# Patient Record
Sex: MALE | Race: WHITE | Employment: OTHER | URBAN - NONMETROPOLITAN AREA
[De-identification: names, ages, dates, MRNs, and addresses within clinical notes are randomized per-mention and may not be internally consistent; named-entity substitution may affect disease eponyms.]

---

## 2017-02-15 ENCOUNTER — OFFICE VISIT (OUTPATIENT)
Dept: PRIMARY CARE CLINIC | Facility: CLINIC | Age: 67
End: 2017-02-15

## 2017-02-15 VITALS
OXYGEN SATURATION: 94 % | RESPIRATION RATE: 16 BRPM | DIASTOLIC BLOOD PRESSURE: 78 MMHG | WEIGHT: 162 LBS | HEART RATE: 56 BPM | TEMPERATURE: 97.5 F | SYSTOLIC BLOOD PRESSURE: 149 MMHG | HEIGHT: 70 IN | BODY MASS INDEX: 23.19 KG/M2

## 2017-02-15 DIAGNOSIS — J01.00 ACUTE MAXILLARY SINUSITIS, RECURRENCE NOT SPECIFIED: Primary | ICD-10-CM

## 2017-02-15 DIAGNOSIS — J44.1 COPD EXACERBATION (HCC): ICD-10-CM

## 2017-02-15 DIAGNOSIS — R05.9 COUGH: ICD-10-CM

## 2017-02-15 LAB
INFLUENZA A ANTIBODY: NEGATIVE
INFLUENZA B ANTIBODY: NEGATIVE

## 2017-02-15 PROCEDURE — 87804 INFLUENZA ASSAY W/OPTIC: CPT | Performed by: NURSE PRACTITIONER

## 2017-02-15 PROCEDURE — 99213 OFFICE O/P EST LOW 20 MIN: CPT | Performed by: NURSE PRACTITIONER

## 2017-02-15 RX ORDER — PREDNISONE 20 MG/1
40 TABLET ORAL DAILY
Qty: 10 TABLET | Refills: 0 | Status: SHIPPED | OUTPATIENT
Start: 2017-02-15 | End: 2017-02-20

## 2017-02-15 RX ORDER — DOXYCYCLINE HYCLATE 100 MG
100 TABLET ORAL 2 TIMES DAILY
Qty: 14 TABLET | Refills: 0 | Status: SHIPPED | OUTPATIENT
Start: 2017-02-15 | End: 2017-02-22

## 2017-02-15 ASSESSMENT — ENCOUNTER SYMPTOMS
WHEEZING: 1
COUGH: 1
SHORTNESS OF BREATH: 0
SINUS PRESSURE: 1

## 2017-03-06 ENCOUNTER — OFFICE VISIT (OUTPATIENT)
Dept: FAMILY MEDICINE CLINIC | Facility: CLINIC | Age: 67
End: 2017-03-06

## 2017-03-06 VITALS
BODY MASS INDEX: 23.96 KG/M2 | SYSTOLIC BLOOD PRESSURE: 118 MMHG | OXYGEN SATURATION: 94 % | DIASTOLIC BLOOD PRESSURE: 58 MMHG | HEART RATE: 60 BPM | TEMPERATURE: 97.6 F | WEIGHT: 167 LBS

## 2017-03-06 DIAGNOSIS — Z23 NEED FOR PNEUMOCOCCAL VACCINATION: ICD-10-CM

## 2017-03-06 DIAGNOSIS — J10.1 INFLUENZA A: Primary | ICD-10-CM

## 2017-03-06 PROCEDURE — 99213 OFFICE O/P EST LOW 20 MIN: CPT | Performed by: NURSE PRACTITIONER

## 2017-03-06 PROCEDURE — G0009 ADMIN PNEUMOCOCCAL VACCINE: HCPCS | Performed by: NURSE PRACTITIONER

## 2017-03-06 PROCEDURE — 90732 PPSV23 VACC 2 YRS+ SUBQ/IM: CPT | Performed by: NURSE PRACTITIONER

## 2017-03-06 RX ORDER — ATORVASTATIN CALCIUM 40 MG/1
40 TABLET, FILM COATED ORAL
COMMUNITY
End: 2017-10-13 | Stop reason: ALTCHOICE

## 2017-03-06 ASSESSMENT — ENCOUNTER SYMPTOMS
COUGH: 1
DIARRHEA: 0
NAUSEA: 0
SPUTUM PRODUCTION: 1
CONSTIPATION: 0
HEMOPTYSIS: 0
SHORTNESS OF BREATH: 0
WHEEZING: 0
VOMITING: 0

## 2017-03-06 ASSESSMENT — LIFESTYLE VARIABLES: HISTORY_ALCOHOL_USE: NO

## 2017-03-31 RX ORDER — FLUTICASONE PROPIONATE 50 MCG
1 SPRAY, SUSPENSION (ML) NASAL DAILY
Qty: 1 BOTTLE | Refills: 3 | Status: SHIPPED | OUTPATIENT
Start: 2017-03-31 | End: 2017-09-25

## 2017-04-06 ENCOUNTER — OFFICE VISIT (OUTPATIENT)
Dept: CARDIOLOGY CLINIC | Age: 67
End: 2017-04-06
Payer: MEDICARE

## 2017-04-06 VITALS
WEIGHT: 168 LBS | OXYGEN SATURATION: 99 % | BODY MASS INDEX: 24.11 KG/M2 | HEART RATE: 58 BPM | SYSTOLIC BLOOD PRESSURE: 140 MMHG | DIASTOLIC BLOOD PRESSURE: 70 MMHG

## 2017-04-06 DIAGNOSIS — I48.0 PAROXYSMAL ATRIAL FIBRILLATION (HCC): Primary | ICD-10-CM

## 2017-04-06 PROCEDURE — 99214 OFFICE O/P EST MOD 30 MIN: CPT | Performed by: INTERNAL MEDICINE

## 2017-04-21 ENCOUNTER — TELEPHONE (OUTPATIENT)
Dept: FAMILY MEDICINE CLINIC | Age: 67
End: 2017-04-21

## 2017-09-25 ENCOUNTER — APPOINTMENT (OUTPATIENT)
Dept: CT IMAGING | Age: 67
End: 2017-09-25
Payer: MEDICARE

## 2017-09-25 ENCOUNTER — HOSPITAL ENCOUNTER (EMERGENCY)
Age: 67
Discharge: HOME | End: 2017-09-25
Attending: FAMILY MEDICINE
Payer: MEDICARE

## 2017-09-25 ENCOUNTER — APPOINTMENT (OUTPATIENT)
Dept: GENERAL RADIOLOGY | Age: 67
End: 2017-09-25
Payer: MEDICARE

## 2017-09-25 VITALS
BODY MASS INDEX: 23.68 KG/M2 | DIASTOLIC BLOOD PRESSURE: 81 MMHG | WEIGHT: 165 LBS | OXYGEN SATURATION: 100 % | RESPIRATION RATE: 20 BRPM | HEART RATE: 57 BPM | TEMPERATURE: 98 F | SYSTOLIC BLOOD PRESSURE: 172 MMHG

## 2017-09-25 DIAGNOSIS — J44.1 COPD EXACERBATION (HCC): Primary | ICD-10-CM

## 2017-09-25 LAB
ABSOLUTE EOS #: 0.2 K/UL (ref 0–0.4)
ABSOLUTE LYMPH #: 1.9 K/UL (ref 1–4.8)
ABSOLUTE MONO #: 0.6 K/UL (ref 0–1)
ANION GAP SERPL CALCULATED.3IONS-SCNC: 9 MMOL/L (ref 9–17)
BASOPHILS # BLD: 1 %
BASOPHILS ABSOLUTE: 0.1 K/UL (ref 0–0.2)
BUN BLDV-MCNC: 17 MG/DL (ref 8–23)
BUN/CREAT BLD: 25 (ref 9–20)
CALCIUM SERPL-MCNC: 9.4 MG/DL (ref 8.6–10.4)
CHLORIDE BLD-SCNC: 102 MMOL/L (ref 98–107)
CO2: 30 MMOL/L (ref 20–31)
CREAT SERPL-MCNC: 0.67 MG/DL (ref 0.7–1.2)
D-DIMER QUANTITATIVE: 1.7 MG/L FEU (ref 0–0.5)
DIFFERENTIAL TYPE: YES
EOSINOPHILS RELATIVE PERCENT: 3 %
GFR AFRICAN AMERICAN: >60 ML/MIN
GFR NON-AFRICAN AMERICAN: >60 ML/MIN
GFR SERPL CREATININE-BSD FRML MDRD: ABNORMAL ML/MIN/{1.73_M2}
GFR SERPL CREATININE-BSD FRML MDRD: ABNORMAL ML/MIN/{1.73_M2}
GLUCOSE BLD-MCNC: 117 MG/DL (ref 70–99)
HCT VFR BLD CALC: 39.1 % (ref 41–53)
HEMOGLOBIN: 13.1 G/DL (ref 13.5–17.5)
LYMPHOCYTES # BLD: 27 %
MCH RBC QN AUTO: 29 PG (ref 26–34)
MCHC RBC AUTO-ENTMCNC: 33.5 G/DL (ref 31–37)
MCV RBC AUTO: 86.6 FL (ref 80–100)
MONOCYTES # BLD: 9 %
PDW BLD-RTO: 14.7 % (ref 12.1–15.2)
PLATELET # BLD: 165 K/UL (ref 140–450)
PLATELET ESTIMATE: ABNORMAL
PMV BLD AUTO: ABNORMAL FL (ref 6–12)
POTASSIUM SERPL-SCNC: 4 MMOL/L (ref 3.7–5.3)
RBC # BLD: 4.52 M/UL (ref 4.5–5.9)
RBC # BLD: ABNORMAL 10*6/UL
SEG NEUTROPHILS: 60 %
SEGMENTED NEUTROPHILS ABSOLUTE COUNT: 4.2 K/UL (ref 2.1–6.5)
SODIUM BLD-SCNC: 141 MMOL/L (ref 135–144)
TROPONIN INTERP: NORMAL
TROPONIN T: <0.03 NG/ML
WBC # BLD: 6.9 K/UL (ref 3.5–11)
WBC # BLD: ABNORMAL 10*3/UL

## 2017-09-25 PROCEDURE — 71275 CT ANGIOGRAPHY CHEST: CPT

## 2017-09-25 PROCEDURE — 99285 EMERGENCY DEPT VISIT HI MDM: CPT

## 2017-09-25 PROCEDURE — 85379 FIBRIN DEGRADATION QUANT: CPT

## 2017-09-25 PROCEDURE — 71020 XR CHEST STANDARD TWO VW: CPT

## 2017-09-25 PROCEDURE — 85025 COMPLETE CBC W/AUTO DIFF WBC: CPT

## 2017-09-25 PROCEDURE — 96365 THER/PROPH/DIAG IV INF INIT: CPT

## 2017-09-25 PROCEDURE — 6370000000 HC RX 637 (ALT 250 FOR IP): Performed by: FAMILY MEDICINE

## 2017-09-25 PROCEDURE — 84484 ASSAY OF TROPONIN QUANT: CPT

## 2017-09-25 PROCEDURE — 96375 TX/PRO/DX INJ NEW DRUG ADDON: CPT

## 2017-09-25 PROCEDURE — 6360000002 HC RX W HCPCS: Performed by: FAMILY MEDICINE

## 2017-09-25 PROCEDURE — 93005 ELECTROCARDIOGRAM TRACING: CPT

## 2017-09-25 PROCEDURE — 6360000004 HC RX CONTRAST MEDICATION: Performed by: FAMILY MEDICINE

## 2017-09-25 PROCEDURE — 94664 DEMO&/EVAL PT USE INHALER: CPT

## 2017-09-25 PROCEDURE — 80048 BASIC METABOLIC PNL TOTAL CA: CPT

## 2017-09-25 PROCEDURE — 94640 AIRWAY INHALATION TREATMENT: CPT

## 2017-09-25 RX ORDER — IPRATROPIUM BROMIDE AND ALBUTEROL SULFATE 2.5; .5 MG/3ML; MG/3ML
1 SOLUTION RESPIRATORY (INHALATION) ONCE
Status: COMPLETED | OUTPATIENT
Start: 2017-09-25 | End: 2017-09-25

## 2017-09-25 RX ORDER — METHYLPREDNISOLONE SODIUM SUCCINATE 125 MG/2ML
125 INJECTION, POWDER, LYOPHILIZED, FOR SOLUTION INTRAMUSCULAR; INTRAVENOUS ONCE
Status: COMPLETED | OUTPATIENT
Start: 2017-09-25 | End: 2017-09-25

## 2017-09-25 RX ORDER — LEVOFLOXACIN 500 MG/1
500 TABLET, FILM COATED ORAL DAILY
Qty: 10 TABLET | Refills: 0 | Status: SHIPPED | OUTPATIENT
Start: 2017-09-25 | End: 2017-10-05

## 2017-09-25 RX ORDER — LEVOFLOXACIN 5 MG/ML
500 INJECTION, SOLUTION INTRAVENOUS ONCE
Status: COMPLETED | OUTPATIENT
Start: 2017-09-25 | End: 2017-09-25

## 2017-09-25 RX ORDER — FLUTICASONE PROPIONATE 50 MCG
1 SPRAY, SUSPENSION (ML) NASAL DAILY
Qty: 1 BOTTLE | Refills: 3 | Status: SHIPPED | OUTPATIENT
Start: 2017-09-25 | End: 2017-12-12 | Stop reason: SDUPTHER

## 2017-09-25 RX ORDER — PREDNISONE 20 MG/1
20 TABLET ORAL 2 TIMES DAILY
Qty: 20 TABLET | Refills: 0 | Status: SHIPPED | OUTPATIENT
Start: 2017-09-25 | End: 2017-10-05

## 2017-09-25 RX ADMIN — METHYLPREDNISOLONE SODIUM SUCCINATE 125 MG: 125 INJECTION, POWDER, FOR SOLUTION INTRAMUSCULAR; INTRAVENOUS at 17:46

## 2017-09-25 RX ADMIN — LEVOFLOXACIN 500 MG: 5 INJECTION, SOLUTION INTRAVENOUS at 17:47

## 2017-09-25 RX ADMIN — IPRATROPIUM BROMIDE AND ALBUTEROL SULFATE 1 AMPULE: .5; 3 SOLUTION RESPIRATORY (INHALATION) at 17:41

## 2017-09-25 RX ADMIN — IOVERSOL 100 ML: 741 INJECTION INTRA-ARTERIAL; INTRAVENOUS at 19:19

## 2017-10-03 LAB
EKG ATRIAL RATE: 60 BPM
EKG P AXIS: 75 DEGREES
EKG P-R INTERVAL: 138 MS
EKG Q-T INTERVAL: 410 MS
EKG QRS DURATION: 80 MS
EKG QTC CALCULATION (BAZETT): 410 MS
EKG R AXIS: 65 DEGREES
EKG T AXIS: 81 DEGREES
EKG VENTRICULAR RATE: 60 BPM

## 2017-10-13 ENCOUNTER — HOSPITAL ENCOUNTER (EMERGENCY)
Age: 67
Discharge: HOME | End: 2017-10-13
Attending: FAMILY MEDICINE
Payer: MEDICARE

## 2017-10-13 VITALS
TEMPERATURE: 98.4 F | DIASTOLIC BLOOD PRESSURE: 74 MMHG | SYSTOLIC BLOOD PRESSURE: 114 MMHG | OXYGEN SATURATION: 97 % | RESPIRATION RATE: 24 BRPM | HEART RATE: 61 BPM

## 2017-10-13 DIAGNOSIS — I48.0 PAROXYSMAL ATRIAL FIBRILLATION (HCC): ICD-10-CM

## 2017-10-13 DIAGNOSIS — B35.3 TINEA PEDIS OF BOTH FEET: Primary | ICD-10-CM

## 2017-10-13 DIAGNOSIS — I71.40 ABDOMINAL ANEURYSM: ICD-10-CM

## 2017-10-13 LAB
EKG ATRIAL RATE: 65 BPM
EKG P AXIS: 47 DEGREES
EKG P-R INTERVAL: 136 MS
EKG Q-T INTERVAL: 396 MS
EKG QRS DURATION: 80 MS
EKG QTC CALCULATION (BAZETT): 411 MS
EKG R AXIS: 71 DEGREES
EKG T AXIS: 77 DEGREES
EKG VENTRICULAR RATE: 65 BPM

## 2017-10-13 PROCEDURE — 99284 EMERGENCY DEPT VISIT MOD MDM: CPT

## 2017-10-13 PROCEDURE — 93005 ELECTROCARDIOGRAM TRACING: CPT

## 2017-10-13 RX ORDER — ROSUVASTATIN CALCIUM 20 MG/1
20 TABLET, COATED ORAL DAILY
COMMUNITY

## 2017-10-13 RX ORDER — KETOCONAZOLE 20 MG/G
CREAM TOPICAL
Qty: 30 G | Refills: 1 | Status: SHIPPED | OUTPATIENT
Start: 2017-10-13 | End: 2017-12-08 | Stop reason: ALTCHOICE

## 2017-10-13 NOTE — ED NOTES
Bilateral feet are red in color on bottoms and started to go to top of feet.        Flavia Owusu, RN  10/13/17 1633

## 2017-10-13 NOTE — ED NOTES
Discharge instructions with script given to patient. Good verbal understanding received from patient and from patient's wife. Skin warm and dry. Respirations even and unlabored. Ambulated to front ED EXit with steady gait.        James Lomax RN  10/13/17 0278

## 2017-10-13 NOTE — ED PROVIDER NOTES
50 MCG/ACT nasal spray 1 spray by Nasal route daily 1 Bottle 3    OXYGEN Inhale into the lungs 2.5 liters daily while sitting, and 3.5 when up ambulating         ALLERGIES    No Known Allergies    FAMILY HISTORY    Family History   Problem Relation Age of Onset    Cancer Mother      Pancreatic cancer    Cancer Father      Lung    Cancer Brother      Brain cancer, pancreatic cancer, colon cancer       SOCIAL HISTORY    Social History     Social History    Marital status:      Spouse name: N/A    Number of children: N/A    Years of education: N/A     Social History Main Topics    Smoking status: Former Smoker     Packs/day: 0.25     Years: 39.00     Types: Cigarettes     Quit date: 2/20/2017    Smokeless tobacco: Never Used    Alcohol use No      Comment: very little    Drug use: No    Sexual activity: Not Asked     Other Topics Concern    None     Social History Narrative    None       REVIEW OF SYSTEMS    Constitutional:  Denies fever, chills, weight loss or weakness   Eyes:  Denies photophobia or discharge   HENT:  Denies sore throat or ear pain   Respiratory:  Denies cough or shortness of breath   Cardiovascular:  Denies chest pain,  or swelling   GI:  Denies abdominal pain, nausea, vomiting, or diarrhea   Musculoskeletal:  Denies back pain   Skin:  Denies rash   Neurologic:  Denies headache, focal weakness or sensory changes   Endocrine:  Denies polyuria or polydypsia   Lymphatic:  Denies swollen glands   Psychiatric:  Denies depression, suicidal ideation or homicidal ideation   All systems negative except as marked. PHYSICAL EXAM    VITAL SIGNS: /74   Pulse 61   Temp 98.4 °F (36.9 °C) (Oral)   Resp 24   SpO2 97%    Constitutional:  Well developed, Well nourished, No acute distress, Non-toxic appearance.    HENT:  Normocephalic, Atraumatic, Bilateral external ears normal, TM's normal, Oropharynx moist, No oral exudates, Nose normal. Neck- Normal range of motion, No tenderness, Supple, No stridor. Eyes:  PERRL, EOMI, Conjunctiva normal, No discharge. Respiratory:  Normal breath sounds, No respiratory distress, No wheezing, No chest tenderness. Cardiovascular:  Normal heart rate, Normal rhythm, No murmurs, No rubs, No gallops appreciated. [facility]:  Bowel sounds normal, Soft, No tenderness, No masses or hepatosplenomegaly, No pulsatile masses. : No CVA tenderness. Musculoskeletal:   No edema, No tenderness, No cyanosis, No clubbing. Good range of motion in all major joints. No tenderness to palpation or major deformities noted. Back- No tenderness to percussion of spine. Integument:  Warm, Dry, No erythema, Scaly red rash in a modified stocking distribution both feet. . Consistent with Tinea. Lymphatic:  No lymphadenopathy noted. Neurologic:  Alert & oriented x 3, Normal motor function, Normal sensory function, No focal deficits noted. Cranial nerves 2-12 wnl and symmetrical.   Psychiatric:  Affect normal, Judgment normal, Mood normal.     EKG    EKG is done on October 13, 2017. Ventricular rate is 65, ND interval 136, QRS 80, /411. Sinus rhythm: Sinus arrhythmia. Low voltage QRS. Borderline EKG. RADIOLOGY    Xr Chest Standard (2 Vw)    Result Date: 9/25/2017  TWO-VIEW CHEST RADIOGRAPH, 9/25/2017 6:10 PM: COMPARISON: Chest, 2/26/2017. CLINICAL HISTORY: Dyspnea with productive cough for the last 2 days. FINDINGS: No acute cardiopulmonary disease. Occasional scattered evidence of calcified granuloma redemonstrated. No pulmonary edema, pneumothorax, or pleural effusion. Normal heart size. No acute osseous abnormality. No acute abnormality identified. Cta Chest W Contrast    Result Date: 9/25/2017  EXAMINATION: CTA CHEST WITH CONTRAST CLINICAL STATEMENT: Shortness of breath for 3 to 4 days. Elevated d-dimers. . COMPARISON: 1/11/2016.  TECHNIQUE: CT angiography (non-coronary) of the chest was performed following intravenous administration of 100 mL of Optiray 350. Coronal and sagittal reconstruction images were also obtained and assessed. Dose reduction techniques were achieved by using automated exposure control and/or adjustment of mA and/or kV according to patient size and/or use of iterative reconstruction technique. FINDINGS: There is no central filling defect to suggest a pulmonary embolus. The visualized neck base is unremarkable. Borderline-enlarged right hilar lymph nodes, nonspecific in etiology and overall stable since prior. It measures up to 1.2 cm in short axis. An additional right hilar lymph node measuring 10 mm in size is seen, also stable since previous study. The heart is normal in size and there is no significant pericardial effusion. The aorta is normal in caliber. Mild diffuse wall calcification of the aorta and its main branches. Additional soft plaque within the origin of the arch vessels. Findings similar to prior. The lungs are clear without infiltrates or nodules. Multiple pleural-based focal plaques again seen within bilateral upper and lower lobe pleura similar to previous exam some of which are partially calcified. Findings not significantly changed since previous exam. There is no pleural effusion or pneumothorax identified. The limited evaluation of the visualized solid organs within the upper abdomen is unremarkable. 1. There is no central filling defect to suggest a pulmonary embolus or aortic aneurysm. No significant interval changes since previous exam. 2. No CT evidence for acute intrathoracic pathology. 3. Redemonstration of numerous focal pleural-based plaques bilaterally overall stable since prior study some of which with partial calcification. Clinical correlation with history of asbestos exposure is recommended. 4. Additional nonacute findings as described. PROCEDURES        Labs  Labs Reviewed - No data to display          Summation      Patient Course: Patient with red feet consistent with tinea pedis. Ketoconazole prescribed. Fu with primary care. See cardiologist about the atrial fib. Call [facility] about graft preparation. ED Medications administered this visit:  Medications - No data to display    New Prescriptions from this visit:    Discharge Medication List as of 10/13/2017 12:18 PM      START taking these medications    Details   ketoconazole (NIZORAL) 2 % cream Apply topically daily. , Disp-30 g, R-1, Print             Follow-up:  Connie Farrar, DO  [address] [address]     In 1 week          Final Impression:   1. Tinea pedis of both feet    2.  Paroxysmal atrial fibrillation (HCC)    3. Abdominal aneurysm ([address]               (Please note that portions of this note were completed with a voice recognition program.  Efforts were made to edit the dictations but occasionally words are mis-transcribed.)            Jason Sanchez MD  10/13/17 5246

## 2017-12-08 ENCOUNTER — HOSPITAL ENCOUNTER (EMERGENCY)
Age: 67
Discharge: HOME | End: 2017-12-08
Attending: EMERGENCY MEDICINE
Payer: MEDICARE

## 2017-12-08 ENCOUNTER — APPOINTMENT (OUTPATIENT)
Dept: CT IMAGING | Age: 67
End: 2017-12-08
Payer: MEDICARE

## 2017-12-08 VITALS
DIASTOLIC BLOOD PRESSURE: 76 MMHG | TEMPERATURE: 98 F | HEART RATE: 58 BPM | RESPIRATION RATE: 20 BRPM | SYSTOLIC BLOOD PRESSURE: 176 MMHG | OXYGEN SATURATION: 97 %

## 2017-12-08 DIAGNOSIS — N21.9 CALCULUS OF LOWER URINARY TRACT: Primary | ICD-10-CM

## 2017-12-08 LAB
-: NORMAL
ABSOLUTE EOS #: 0.2 K/UL (ref 0–0.4)
ABSOLUTE IMMATURE GRANULOCYTE: ABNORMAL K/UL (ref 0–0.3)
ABSOLUTE LYMPH #: 1.5 K/UL (ref 1–4.8)
ABSOLUTE MONO #: 0.6 K/UL (ref 0–1)
ALBUMIN SERPL-MCNC: 4.4 G/DL (ref 3.5–5.2)
ALBUMIN/GLOBULIN RATIO: ABNORMAL (ref 1–2.5)
ALP BLD-CCNC: 71 U/L (ref 40–129)
ALT SERPL-CCNC: 17 U/L (ref 5–41)
AMORPHOUS: NORMAL
ANION GAP SERPL CALCULATED.3IONS-SCNC: 13 MMOL/L (ref 9–17)
AST SERPL-CCNC: 21 U/L
BACTERIA: NORMAL
BASOPHILS # BLD: 1 % (ref 0–2)
BASOPHILS ABSOLUTE: 0.1 K/UL (ref 0–0.2)
BILIRUB SERPL-MCNC: 0.61 MG/DL (ref 0.3–1.2)
BILIRUBIN URINE: NEGATIVE
BUN BLDV-MCNC: 14 MG/DL (ref 8–23)
BUN/CREAT BLD: 15 (ref 9–20)
CALCIUM SERPL-MCNC: 9.8 MG/DL (ref 8.6–10.4)
CASTS UA: NORMAL /LPF
CHLORIDE BLD-SCNC: 97 MMOL/L (ref 98–107)
CO2: 29 MMOL/L (ref 20–31)
COLOR: ABNORMAL
COMMENT UA: ABNORMAL
CREAT SERPL-MCNC: 0.94 MG/DL (ref 0.7–1.2)
CRYSTALS, UA: NORMAL /HPF
DIFFERENTIAL TYPE: YES
EOSINOPHILS RELATIVE PERCENT: 3 % (ref 0–5)
EPITHELIAL CELLS UA: NORMAL /HPF
GFR AFRICAN AMERICAN: >60 ML/MIN
GFR NON-AFRICAN AMERICAN: >60 ML/MIN
GFR SERPL CREATININE-BSD FRML MDRD: ABNORMAL ML/MIN/{1.73_M2}
GFR SERPL CREATININE-BSD FRML MDRD: ABNORMAL ML/MIN/{1.73_M2}
GLUCOSE BLD-MCNC: 96 MG/DL (ref 70–99)
GLUCOSE URINE: NEGATIVE
HCT VFR BLD CALC: 38.9 % (ref 41–53)
HEMOGLOBIN: 12.8 G/DL (ref 13.5–17.5)
IMMATURE GRANULOCYTES: ABNORMAL %
KETONES, URINE: NEGATIVE
LEUKOCYTE ESTERASE, URINE: ABNORMAL
LYMPHOCYTES # BLD: 16 % (ref 13–44)
MCH RBC QN AUTO: 28.7 PG (ref 26–34)
MCHC RBC AUTO-ENTMCNC: 32.8 G/DL (ref 31–37)
MCV RBC AUTO: 87.5 FL (ref 80–100)
MONOCYTES # BLD: 7 % (ref 5–9)
MUCUS: NORMAL
NITRITE, URINE: NEGATIVE
OTHER OBSERVATIONS UA: NORMAL
PDW BLD-RTO: 14.5 % (ref 12.1–15.2)
PH UA: 6 (ref 5–8)
PLATELET # BLD: 184 K/UL (ref 140–450)
PLATELET ESTIMATE: ABNORMAL
PMV BLD AUTO: ABNORMAL FL (ref 6–12)
POTASSIUM SERPL-SCNC: 4.5 MMOL/L (ref 3.7–5.3)
PROTEIN UA: ABNORMAL
RBC # BLD: 4.45 M/UL (ref 4.5–5.9)
RBC # BLD: ABNORMAL 10*6/UL
RBC UA: NORMAL /HPF (ref 0–2)
RENAL EPITHELIAL, UA: NORMAL /HPF
SEG NEUTROPHILS: 73 % (ref 39–75)
SEGMENTED NEUTROPHILS ABSOLUTE COUNT: 7 K/UL (ref 2.1–6.5)
SODIUM BLD-SCNC: 139 MMOL/L (ref 135–144)
SPECIFIC GRAVITY UA: 1.01 (ref 1–1.03)
TOTAL PROTEIN: 7.4 G/DL (ref 6.4–8.3)
TRICHOMONAS: NORMAL
TURBIDITY: ABNORMAL
URINE HGB: ABNORMAL
UROBILINOGEN, URINE: NORMAL
WBC # BLD: 9.4 K/UL (ref 3.5–11)
WBC # BLD: ABNORMAL 10*3/UL
WBC UA: NORMAL /HPF
YEAST: NORMAL

## 2017-12-08 PROCEDURE — 99284 EMERGENCY DEPT VISIT MOD MDM: CPT

## 2017-12-08 PROCEDURE — 74176 CT ABD & PELVIS W/O CONTRAST: CPT

## 2017-12-08 PROCEDURE — 85025 COMPLETE CBC W/AUTO DIFF WBC: CPT

## 2017-12-08 PROCEDURE — 6370000000 HC RX 637 (ALT 250 FOR IP): Performed by: EMERGENCY MEDICINE

## 2017-12-08 PROCEDURE — 6360000002 HC RX W HCPCS: Performed by: EMERGENCY MEDICINE

## 2017-12-08 PROCEDURE — 81001 URINALYSIS AUTO W/SCOPE: CPT

## 2017-12-08 PROCEDURE — 96374 THER/PROPH/DIAG INJ IV PUSH: CPT

## 2017-12-08 PROCEDURE — 80053 COMPREHEN METABOLIC PANEL: CPT

## 2017-12-08 RX ORDER — KETOROLAC TROMETHAMINE 30 MG/ML
15 INJECTION, SOLUTION INTRAMUSCULAR; INTRAVENOUS ONCE
Status: COMPLETED | OUTPATIENT
Start: 2017-12-08 | End: 2017-12-08

## 2017-12-08 RX ORDER — HYDROCODONE BITARTRATE AND ACETAMINOPHEN 5; 325 MG/1; MG/1
1 TABLET ORAL EVERY 6 HOURS PRN
Qty: 10 TABLET | Refills: 0 | Status: SHIPPED | OUTPATIENT
Start: 2017-12-08 | End: 2017-12-15

## 2017-12-08 RX ORDER — KETOROLAC TROMETHAMINE 30 MG/ML
30 INJECTION, SOLUTION INTRAMUSCULAR; INTRAVENOUS ONCE
Status: DISCONTINUED | OUTPATIENT
Start: 2017-12-08 | End: 2017-12-08

## 2017-12-08 RX ORDER — HYDROCODONE BITARTRATE AND ACETAMINOPHEN 5; 325 MG/1; MG/1
1 TABLET ORAL ONCE
Status: COMPLETED | OUTPATIENT
Start: 2017-12-08 | End: 2017-12-08

## 2017-12-08 RX ADMIN — HYDROCODONE BITARTRATE AND ACETAMINOPHEN 1 TABLET: 5; 325 TABLET ORAL at 20:03

## 2017-12-08 RX ADMIN — KETOROLAC TROMETHAMINE 15 MG: 30 INJECTION, SOLUTION INTRAMUSCULAR at 19:48

## 2017-12-08 ASSESSMENT — PAIN DESCRIPTION - FREQUENCY: FREQUENCY: CONTINUOUS

## 2017-12-08 ASSESSMENT — PAIN DESCRIPTION - PAIN TYPE: TYPE: ACUTE PAIN

## 2017-12-08 ASSESSMENT — PAIN DESCRIPTION - ORIENTATION: ORIENTATION: RIGHT

## 2017-12-08 ASSESSMENT — PAIN SCALES - GENERAL
PAINLEVEL_OUTOF10: 8
PAINLEVEL_OUTOF10: 6
PAINLEVEL_OUTOF10: 8

## 2017-12-08 ASSESSMENT — PAIN DESCRIPTION - ONSET: ONSET: ON-GOING

## 2017-12-08 ASSESSMENT — PAIN DESCRIPTION - DIRECTION: RADIATING_TOWARDS: RIGHT FLANK

## 2017-12-08 ASSESSMENT — PAIN DESCRIPTION - PROGRESSION: CLINICAL_PROGRESSION: GRADUALLY WORSENING

## 2017-12-08 ASSESSMENT — PAIN DESCRIPTION - LOCATION: LOCATION: ABDOMEN

## 2017-12-08 ASSESSMENT — PAIN DESCRIPTION - DESCRIPTORS: DESCRIPTORS: CONSTANT

## 2017-12-08 NOTE — ED PROVIDER NOTES
eMERGENCY dEPARTMENT eNCOUnter      [address] [facility]    Chief Complaint   Patient presents with    Abdominal Pain     right sided pain that radiates to right flank       hospitals    Rachel Maritn is a 74 y.o. male who presents to ED from home. By car. With complaint of R flank pain. Onset this am.  Intensity of symptoms moderate. Location of symptoms R flank. The patient started this morning. Patient has no prior history of kidney stones. Patient has a history of abdominal aneurysm. REVIEW OF SYSTEMS    All systems reviewed and positives are in the HPI    PAST MEDICAL HISTORY    Past Medical History:   Diagnosis Date    Abdominal aneurysm ([address]     x2    Atrial fibrillation (HCC)     Chicken pox     Chronic back pain     [facility]     Emphysema of lung (HCC)     [facility]     Hyperlipidemia     Hypertension 2015    Osteoarthritis        SURGICAL HISTORY    Past Surgical History:   Procedure Laterality Date    CARDIAC CATHETERIZATION Left 08/05/15    Dr. Shin People 30% disease of the left main trunck, Mild plaque disease in the coronary arteries. Normal LV function, ejection fraction of 60%    EYE SURGERY         CURRENT MEDICATIONS    Current Outpatient Rx   Medication Sig Dispense Refill    HYDROcodone-acetaminophen (NORCO) 5-325 MG per tablet Take 1 tablet by mouth every 6 hours as needed for Pain . 10 tablet 0    rosuvastatin (CRESTOR) 20 MG tablet Take 20 mg by mouth daily      fluticasone (FLONASE) 50 MCG/ACT nasal spray 1 spray by Nasal route daily 1 Bottle 3    OXYGEN Inhale into the lungs 2.5 liters daily while sitting, and 3.5 when up ambulating      carvedilol (COREG) 25 MG tablet Take 12.5 mg by mouth 2 times daily (with meals)       aspirin 81 MG chewable tablet Take 1 tablet by mouth daily 30 tablet 3    budesonide-formoterol (SYMBICORT) 160-4.5 MCG/ACT AERO Inhale 2 puffs into the lungs 2 times daily.       albuterol (PROVENTIL;VENTOLIN) 90 MCG/ACT inhaler Inhale 2 puffs into the lungs every 6 hours as needed.  tiotropium (SPIRIVA HANDIHALER) 18 MCG inhalation capsule Inhale 18 mcg into the lungs daily. ALLERGIES    No Known Allergies    FAMILY HISTORY    Family History   Problem Relation Age of Onset    Cancer Mother      Pancreatic cancer    Cancer Father      Lung    Cancer Brother      Brain cancer, pancreatic cancer, colon cancer       SOCIAL HISTORY    Social History     Social History    Marital status:      Spouse name: N/A    Number of children: N/A    Years of education: N/A     Social History Main Topics    Smoking status: Current Every Day Smoker     Packs/day: 0.25     Years: 39.00     Types: Cigarettes     Last attempt to quit: 2/20/2017    Smokeless tobacco: Never Used    Alcohol use No      Comment: very little    Drug use: No    Sexual activity: Not Asked     Other Topics Concern    None     Social History Narrative    None       PHYSICAL EXAM    VITAL SIGNS: BP (!) 176/76   Pulse 58   Temp 98 °F (36.7 °C) (Oral)   Resp 20   SpO2 98%   Constitutional:  Well developed, well nourished, no acute distress, non-toxic appearance   Eyes:  PERRL, conjunctiva normal   HENT:  Atraumatic, external ears normal, nose normal, oropharynx moist. Neck supple   Respiratory:  No respiratory distress, normal breath sounds   Cardiovascular:  Normal rate, normal rhythm, no murmurs, no gallops, no rubs   GI:  Abdomen is soft with mild right flank pain. :  CVA tenderness to percussion. Musculoskeletal:  No edema   Integument:  Well hydrated   Neurologic:  Alert & oriented x 3, no focal deficits     EKG        RADIOLOGY/PROCEDURES    CT ABDOMEN PELVIS WO IV CONTRAST   Final Result      1. Obstructing 2.5 mm calculus at the right ureterovesical junction causing    right-sided hydronephrosis with perinephric stranding. 2. Otherwise unchanged study.       3. Saccular aneurysm projecting off the left lateral wall of the abdominal    aorta at the left renal artery origin measuring 3.6 x 2.6 cm in combined    diameter with the native aorta. 4. A separate focal fusiform ectasia of the distal infrarenal abdominal aorta    is also unchanged by my measurements measuring 2.9 x 2.7 cm.      5. Calcified pleural plaque at the included lung bases compatible with asbestos    related disease. 6. Benign-appearing left renal cyst posteriorly measuring 16 mm.      7. Bilateral fat-containing inguinal hernias measuring 3.6 centers on the right    and 3.0 cm on the left. Labs  Labs Reviewed   URINALYSIS - Abnormal; Notable for the following:        Result Value    Color, UA SHWETA (*)     Turbidity UA CLOUDY (*)     Urine Hgb 3+ (*)     Protein, UA 2+ (*)     Leukocyte Esterase, Urine 1+ (*)     All other components within normal limits   CBC WITH AUTO DIFFERENTIAL - Abnormal; Notable for the following:     RBC 4.45 (*)     Hemoglobin 12.8 (*)     Hematocrit 38.9 (*)     Segs Absolute 7.00 (*)     All other components within normal limits   COMPREHENSIVE METABOLIC PANEL - Abnormal; Notable for the following:     Chloride 97 (*)     All other components within normal limits   MICROSCOPIC URINALYSIS           Summation      Patient Course: Patient has 2.5 mm kidney stone on CT. The warning signs were discussed. Patient is sent home with a prescription for Vicodin. Return to ED if worse. ED Medications administered this visit:    Medications   HYDROcodone-acetaminophen (NORCO) 5-325 MG per tablet 1 tablet (not administered)   ketorolac (TORADOL) injection 15 mg (15 mg Intravenous Given 12/1950)       New Prescriptions from this visit:    New Prescriptions    HYDROCODONE-ACETAMINOPHEN (NORCO) 5-325 MG PER TABLET    Take 1 tablet by mouth every 6 hours as needed for Pain .        Follow-up:  MD Trena Mata

## 2017-12-09 NOTE — ED NOTES
Pt. D/c with strainers and instructions to follow up with urology.      Leila Bright RN  12/08/17 2020

## 2017-12-11 ENCOUNTER — TELEPHONE (OUTPATIENT)
Dept: UROLOGY | Age: 67
End: 2017-12-11

## 2017-12-11 NOTE — TELEPHONE ENCOUNTER
Patient's wife called to schedule patient for follow up for stone - patient was seen in ER. Offered patient to come to [place] office today or tomorrow to be seen in case surgery needed but wife and patient both refused. Wife stated to make appointment in [facility]. She stated he is not a surgical candidate and they would wait to be seen in [facility].

## 2017-12-12 RX ORDER — FLUTICASONE PROPIONATE 50 MCG
1 SPRAY, SUSPENSION (ML) NASAL DAILY
Qty: 1 BOTTLE | Refills: 3 | Status: SHIPPED | OUTPATIENT
Start: 2017-12-12

## 2017-12-21 ENCOUNTER — OFFICE VISIT (OUTPATIENT)
Dept: UROLOGY | Age: 67
End: 2017-12-21
Payer: MEDICARE

## 2017-12-21 ENCOUNTER — HOSPITAL ENCOUNTER (OUTPATIENT)
Age: 67
Setting detail: SPECIMEN
Discharge: HOME | End: 2017-12-21
Payer: MEDICARE

## 2017-12-21 VITALS
SYSTOLIC BLOOD PRESSURE: 128 MMHG | WEIGHT: 166 LBS | BODY MASS INDEX: 23.77 KG/M2 | HEIGHT: 70 IN | DIASTOLIC BLOOD PRESSURE: 60 MMHG

## 2017-12-21 DIAGNOSIS — R31.9 HEMATURIA, UNSPECIFIED TYPE: Primary | ICD-10-CM

## 2017-12-21 DIAGNOSIS — N20.1 URETERAL CALCULUS: Primary | ICD-10-CM

## 2017-12-21 DIAGNOSIS — R31.9 HEMATURIA, UNSPECIFIED TYPE: ICD-10-CM

## 2017-12-21 LAB
-: ABNORMAL
AMORPHOUS: ABNORMAL
BACTERIA: ABNORMAL
BILIRUBIN URINE: NEGATIVE
CASTS UA: ABNORMAL /LPF
COLOR: ABNORMAL
COMMENT UA: ABNORMAL
CRYSTALS, UA: ABNORMAL /HPF
EPITHELIAL CELLS UA: ABNORMAL /HPF
GLUCOSE URINE: NEGATIVE
KETONES, URINE: NEGATIVE
LEUKOCYTE ESTERASE, URINE: NEGATIVE
MUCUS: ABNORMAL
NITRITE, URINE: NEGATIVE
OTHER OBSERVATIONS UA: ABNORMAL
PH UA: 6.5 (ref 5–8)
PROTEIN UA: NEGATIVE
RBC UA: ABNORMAL /HPF (ref 0–2)
RENAL EPITHELIAL, UA: ABNORMAL /HPF
SPECIFIC GRAVITY UA: 1.01 (ref 1–1.03)
TRICHOMONAS: ABNORMAL
TURBIDITY: CLEAR
URINE HGB: NEGATIVE
UROBILINOGEN, URINE: NORMAL
WBC UA: ABNORMAL /HPF
YEAST: ABNORMAL

## 2017-12-21 PROCEDURE — 81001 URINALYSIS AUTO W/SCOPE: CPT

## 2017-12-21 PROCEDURE — 99204 OFFICE O/P NEW MOD 45 MIN: CPT | Performed by: UROLOGY

## 2017-12-21 PROCEDURE — G8427 DOCREV CUR MEDS BY ELIG CLIN: HCPCS | Performed by: UROLOGY

## 2017-12-21 PROCEDURE — 87086 URINE CULTURE/COLONY COUNT: CPT

## 2017-12-21 PROCEDURE — 3017F COLORECTAL CA SCREEN DOC REV: CPT | Performed by: UROLOGY

## 2017-12-21 PROCEDURE — 1123F ACP DISCUSS/DSCN MKR DOCD: CPT | Performed by: UROLOGY

## 2017-12-21 PROCEDURE — 4040F PNEUMOC VAC/ADMIN/RCVD: CPT | Performed by: UROLOGY

## 2017-12-21 PROCEDURE — G8484 FLU IMMUNIZE NO ADMIN: HCPCS | Performed by: UROLOGY

## 2017-12-21 PROCEDURE — G8420 CALC BMI NORM PARAMETERS: HCPCS | Performed by: UROLOGY

## 2017-12-21 PROCEDURE — 4004F PT TOBACCO SCREEN RCVD TLK: CPT | Performed by: UROLOGY

## 2017-12-21 ASSESSMENT — ENCOUNTER SYMPTOMS
COUGH: 0
EYE PAIN: 0
WHEEZING: 0
EYE REDNESS: 0
ABDOMINAL PAIN: 0
NAUSEA: 0
SHORTNESS OF BREATH: 0
VOMITING: 0
COLOR CHANGE: 0
BACK PAIN: 0

## 2017-12-21 NOTE — PROGRESS NOTES
pulses  Abdomen: Soft, non-tender, non-distended with no CVA, flank pain, hepatosplenomegaly or hernia. Kidneys normal.  Bladder non-tender and not distended. Lymphatics: no palpable lymphadenopathy  Penis normal and circumcised  Urethral meatus normal  Scrotal exam normal  Testicles normal bilaterally  Epididymis normal bilaterally  No evidence of inguinal hernia  Anus and perineum normal  Normal rectal tone with no masses  Prostate soft, non-tender to palpation. No palpable nodules. Estimated 40 grams. Seminal vesicles not palpable. No results found for this visit on 12/21/17. No results found for: PSA  Lab Results   Component Value Date    BUN 14 12/08/2017     Lab Results   Component Value Date    CREATININE 0.94 12/08/2017     Review of Systems   Constitutional: Negative for appetite change, chills and fever. Eyes: Negative for pain, redness and visual disturbance. Respiratory: Negative for cough, shortness of breath and wheezing. Cardiovascular: Negative for chest pain and leg swelling. Gastrointestinal: Negative for abdominal pain, nausea and vomiting. Genitourinary: Negative for difficulty urinating, discharge, dysuria, flank pain, frequency, hematuria, scrotal swelling and testicular pain. Musculoskeletal: Negative for back pain, joint swelling and myalgias. Skin: Negative for color change, rash and wound. Neurological: Negative for dizziness, tremors and numbness. Hematological: Negative for adenopathy. Does not bruise/bleed easily. Objective:   Physical Exam    Assessment: This is a 69 y.o. male with the following diagnoses:  1. Ureteral calculus  XR ABDOMEN (KUB) (SINGLE AP VIEW)         Plan:      I did offer patient upper blockers today. He does not wish to pursue these. He would like to get a KUB in 6 months.

## 2017-12-22 LAB
CULTURE: NO GROWTH
CULTURE: NORMAL
Lab: NORMAL
SPECIMEN DESCRIPTION: NORMAL
SPECIMEN DESCRIPTION: NORMAL
STATUS: NORMAL

## 2019-01-07 ENCOUNTER — HOSPITAL ENCOUNTER (EMERGENCY)
Age: 69
Discharge: HOME | End: 2019-01-07
Attending: FAMILY MEDICINE
Payer: MEDICARE

## 2019-01-07 ENCOUNTER — APPOINTMENT (OUTPATIENT)
Dept: GENERAL RADIOLOGY | Age: 69
End: 2019-01-07
Payer: MEDICARE

## 2019-01-07 VITALS
BODY MASS INDEX: 21.66 KG/M2 | DIASTOLIC BLOOD PRESSURE: 71 MMHG | RESPIRATION RATE: 20 BRPM | TEMPERATURE: 98.5 F | HEART RATE: 58 BPM | HEIGHT: 72 IN | WEIGHT: 159.9 LBS | OXYGEN SATURATION: 99 % | SYSTOLIC BLOOD PRESSURE: 138 MMHG

## 2019-01-07 DIAGNOSIS — J20.9 SUBACUTE BRONCHITIS: Primary | ICD-10-CM

## 2019-01-07 LAB
DIRECT EXAM: NORMAL
DIRECT EXAM: NORMAL
EKG ATRIAL RATE: 53 BPM
EKG P AXIS: 88 DEGREES
EKG P-R INTERVAL: 142 MS
EKG Q-T INTERVAL: 424 MS
EKG QRS DURATION: 78 MS
EKG QTC CALCULATION (BAZETT): 397 MS
EKG R AXIS: 77 DEGREES
EKG T AXIS: 66 DEGREES
EKG VENTRICULAR RATE: 53 BPM
Lab: NORMAL
SPECIMEN DESCRIPTION: NORMAL
STATUS: NORMAL

## 2019-01-07 PROCEDURE — 71046 X-RAY EXAM CHEST 2 VIEWS: CPT

## 2019-01-07 PROCEDURE — 99283 EMERGENCY DEPT VISIT LOW MDM: CPT

## 2019-01-07 PROCEDURE — 93005 ELECTROCARDIOGRAM TRACING: CPT

## 2019-01-07 PROCEDURE — 87804 INFLUENZA ASSAY W/OPTIC: CPT

## 2019-01-07 RX ORDER — AZITHROMYCIN 250 MG/1
TABLET, FILM COATED ORAL
Qty: 6 TABLET | Refills: 0 | Status: SHIPPED | OUTPATIENT
Start: 2019-01-07 | End: 2019-01-17

## 2019-01-07 RX ORDER — PREDNISONE 20 MG/1
60 TABLET ORAL DAILY
Qty: 15 TABLET | Refills: 0 | Status: SHIPPED | OUTPATIENT
Start: 2019-01-07 | End: 2019-01-12

## 2019-01-07 RX ORDER — BENZONATATE 100 MG/1
100 CAPSULE ORAL 3 TIMES DAILY PRN
Qty: 30 CAPSULE | Refills: 0 | Status: SHIPPED | OUTPATIENT
Start: 2019-01-07 | End: 2019-01-14

## 2019-05-13 ENCOUNTER — APPOINTMENT (OUTPATIENT)
Dept: GENERAL RADIOLOGY | Age: 69
End: 2019-05-13
Payer: MEDICARE

## 2019-05-13 ENCOUNTER — HOSPITAL ENCOUNTER (EMERGENCY)
Age: 69
Discharge: HOME | End: 2019-05-13
Attending: FAMILY MEDICINE
Payer: MEDICARE

## 2019-05-13 VITALS
HEART RATE: 53 BPM | SYSTOLIC BLOOD PRESSURE: 144 MMHG | TEMPERATURE: 97.6 F | OXYGEN SATURATION: 97 % | WEIGHT: 160 LBS | DIASTOLIC BLOOD PRESSURE: 70 MMHG | RESPIRATION RATE: 18 BRPM | BODY MASS INDEX: 21.7 KG/M2

## 2019-05-13 DIAGNOSIS — J44.1 COPD WITH EXACERBATION (HCC): ICD-10-CM

## 2019-05-13 DIAGNOSIS — J40 BRONCHITIS: Primary | ICD-10-CM

## 2019-05-13 LAB
ABSOLUTE EOS #: 0.2 K/UL (ref 0–0.4)
ABSOLUTE IMMATURE GRANULOCYTE: ABNORMAL K/UL (ref 0–0.3)
ABSOLUTE LYMPH #: 1.2 K/UL (ref 1–4.8)
ABSOLUTE MONO #: 0.6 K/UL (ref 0–1)
ANION GAP SERPL CALCULATED.3IONS-SCNC: 12 MMOL/L (ref 9–17)
BASOPHILS # BLD: 1 % (ref 0–2)
BASOPHILS ABSOLUTE: 0 K/UL (ref 0–0.2)
BUN BLDV-MCNC: 16 MG/DL (ref 8–23)
BUN/CREAT BLD: 20 (ref 9–20)
CALCIUM SERPL-MCNC: 8.8 MG/DL (ref 8.6–10.4)
CHLORIDE BLD-SCNC: 98 MMOL/L (ref 98–107)
CO2: 28 MMOL/L (ref 20–31)
CREAT SERPL-MCNC: 0.8 MG/DL (ref 0.7–1.2)
DIFFERENTIAL TYPE: YES
EOSINOPHILS RELATIVE PERCENT: 4 % (ref 0–5)
GFR AFRICAN AMERICAN: >60 ML/MIN
GFR NON-AFRICAN AMERICAN: >60 ML/MIN
GFR SERPL CREATININE-BSD FRML MDRD: ABNORMAL ML/MIN/{1.73_M2}
GFR SERPL CREATININE-BSD FRML MDRD: ABNORMAL ML/MIN/{1.73_M2}
GLUCOSE BLD-MCNC: 125 MG/DL (ref 70–99)
HCT VFR BLD CALC: 37.1 % (ref 41–53)
HEMOGLOBIN: 12.4 G/DL (ref 13.5–17.5)
IMMATURE GRANULOCYTES: ABNORMAL %
LYMPHOCYTES # BLD: 24 % (ref 13–44)
MCH RBC QN AUTO: 29 PG (ref 26–34)
MCHC RBC AUTO-ENTMCNC: 33.4 G/DL (ref 31–37)
MCV RBC AUTO: 86.7 FL (ref 80–100)
MONOCYTES # BLD: 12 % (ref 5–9)
NRBC AUTOMATED: ABNORMAL PER 100 WBC
PDW BLD-RTO: 14.3 % (ref 12.1–15.2)
PLATELET # BLD: 167 K/UL (ref 140–450)
PLATELET ESTIMATE: ABNORMAL
PMV BLD AUTO: ABNORMAL FL (ref 6–12)
POTASSIUM SERPL-SCNC: 4 MMOL/L (ref 3.7–5.3)
RBC # BLD: 4.28 M/UL (ref 4.5–5.9)
RBC # BLD: ABNORMAL 10*6/UL
SEG NEUTROPHILS: 59 % (ref 39–75)
SEGMENTED NEUTROPHILS ABSOLUTE COUNT: 3.1 K/UL (ref 2.1–6.5)
SODIUM BLD-SCNC: 138 MMOL/L (ref 135–144)
WBC # BLD: 5.2 K/UL (ref 3.5–11)
WBC # BLD: ABNORMAL 10*3/UL

## 2019-05-13 PROCEDURE — 6360000002 HC RX W HCPCS: Performed by: FAMILY MEDICINE

## 2019-05-13 PROCEDURE — 80048 BASIC METABOLIC PNL TOTAL CA: CPT

## 2019-05-13 PROCEDURE — 6370000000 HC RX 637 (ALT 250 FOR IP): Performed by: FAMILY MEDICINE

## 2019-05-13 PROCEDURE — 96374 THER/PROPH/DIAG INJ IV PUSH: CPT

## 2019-05-13 PROCEDURE — 94640 AIRWAY INHALATION TREATMENT: CPT

## 2019-05-13 PROCEDURE — 99284 EMERGENCY DEPT VISIT MOD MDM: CPT

## 2019-05-13 PROCEDURE — 85025 COMPLETE CBC W/AUTO DIFF WBC: CPT

## 2019-05-13 PROCEDURE — 93005 ELECTROCARDIOGRAM TRACING: CPT

## 2019-05-13 PROCEDURE — 94664 DEMO&/EVAL PT USE INHALER: CPT

## 2019-05-13 PROCEDURE — 71046 X-RAY EXAM CHEST 2 VIEWS: CPT

## 2019-05-13 RX ORDER — AZITHROMYCIN 250 MG/1
500 TABLET, FILM COATED ORAL ONCE
Status: COMPLETED | OUTPATIENT
Start: 2019-05-13 | End: 2019-05-13

## 2019-05-13 RX ORDER — IPRATROPIUM BROMIDE AND ALBUTEROL SULFATE 2.5; .5 MG/3ML; MG/3ML
1 SOLUTION RESPIRATORY (INHALATION) ONCE
Status: COMPLETED | OUTPATIENT
Start: 2019-05-13 | End: 2019-05-13

## 2019-05-13 RX ORDER — ALBUTEROL SULFATE 2.5 MG/3ML
2.5 SOLUTION RESPIRATORY (INHALATION) EVERY 6 HOURS PRN
COMMUNITY

## 2019-05-13 RX ORDER — ACETAMINOPHEN 160 MG
2000 TABLET,DISINTEGRATING ORAL DAILY
COMMUNITY
End: 2021-03-15

## 2019-05-13 RX ORDER — AZITHROMYCIN 250 MG/1
TABLET, FILM COATED ORAL
Qty: 6 TABLET | Refills: 0 | Status: SHIPPED | OUTPATIENT
Start: 2019-05-13 | End: 2019-05-23

## 2019-05-13 RX ORDER — METHYLPREDNISOLONE SODIUM SUCCINATE 125 MG/2ML
125 INJECTION, POWDER, LYOPHILIZED, FOR SOLUTION INTRAMUSCULAR; INTRAVENOUS ONCE
Status: COMPLETED | OUTPATIENT
Start: 2019-05-13 | End: 2019-05-13

## 2019-05-13 RX ORDER — PREDNISONE 20 MG/1
60 TABLET ORAL DAILY
Qty: 15 TABLET | Refills: 0 | Status: SHIPPED | OUTPATIENT
Start: 2019-05-13 | End: 2019-05-18

## 2019-05-13 RX ADMIN — IPRATROPIUM BROMIDE AND ALBUTEROL SULFATE 1 AMPULE: .5; 3 SOLUTION RESPIRATORY (INHALATION) at 19:25

## 2019-05-13 RX ADMIN — METHYLPREDNISOLONE SODIUM SUCCINATE 125 MG: 125 INJECTION, POWDER, FOR SOLUTION INTRAMUSCULAR; INTRAVENOUS at 18:36

## 2019-05-13 RX ADMIN — AZITHROMYCIN 500 MG: 250 TABLET, FILM COATED ORAL at 20:17

## 2019-05-13 RX ADMIN — IPRATROPIUM BROMIDE AND ALBUTEROL SULFATE 1 AMPULE: .5; 3 SOLUTION RESPIRATORY (INHALATION) at 18:21

## 2019-05-14 LAB
EKG ATRIAL RATE: 60 BPM
EKG P AXIS: 80 DEGREES
EKG P-R INTERVAL: 148 MS
EKG Q-T INTERVAL: 438 MS
EKG QRS DURATION: 80 MS
EKG QTC CALCULATION (BAZETT): 438 MS
EKG R AXIS: 79 DEGREES
EKG T AXIS: 76 DEGREES
EKG VENTRICULAR RATE: 60 BPM

## 2019-05-14 NOTE — ED PROVIDER NOTES
[facility]  eMERGENCY dEPARTMENT eNCOUnter          [address] [facility]       Chief Complaint   Patient presents with    Cough     productive cough and congestion for 2 days     Nasal Congestion       Nurses Notes reviewed and I agree except as noted in the HPI. HISTORY OF PRESENT ILLNESS    Tiera Rocha is a 77 y.o. male who presents to emergency room with complaint of \"not feeling well\", patient states he's had cough and yellow-green phlegm production, subjective chills, some nasal congestion, states home nebulizing treatments have not helped. Patient has known history of COPD, does have home O2 at home nebulizer unit. Denies any chest pain. Denies any swelling of his lower extremities. Onset of symptoms over the past 2 days. REVIEW OF SYSTEMS     Review of Systems   All other systems reviewed and are negative. PAST MEDICAL HISTORY    has a past medical history of Abdominal aneurysm ([address] Atrial fibrillation ([address] Chicken pox, Chronic back pain, COPD (chronic obstructive pulmonary disease) ([address] Emphysema of lung ([address] Emphysema of lung ([address] Hyperlipidemia, Hypertension, and Osteoarthritis. SURGICAL HISTORY      has a past surgical history that includes eye surgery and Cardiac catheterization (Left, 08/05/15).     CURRENT MEDICATIONS       Discharge Medication List as of 5/13/2019  8:13 PM      CONTINUE these medications which have NOT CHANGED    Details   Cholecalciferol (VITAMIN D3) 2000 units CAPS Take 2,000 Units by mouth dailyHistorical Med      albuterol (PROVENTIL) (2.5 MG/3ML) 0.083% nebulizer solution Take 2.5 mg by nebulization every 6 hours as needed for WheezingHistorical Med      fluticasone (FLONASE) 50 MCG/ACT nasal spray 1 spray by Nasal route daily, Disp-1 Bottle, R-3Normal      rosuvastatin (CRESTOR) 20 MG tablet Take 20 mg by mouth dailyHistorical Med      OXYGEN Inhale into the lungs 2.5 liters daily while sitting, and 3.5-4 when up ambulatingHistorical Med      carvedilol (COREG) 25 MG tablet Take 12.5 mg by mouth 2 times daily (with meals)       aspirin 81 MG chewable tablet Take 1 tablet by mouth daily, Disp-30 tablet, R-3      budesonide-formoterol (SYMBICORT) 160-4.5 MCG/ACT AERO Inhale 2 puffs into the lungs 2 times daily. albuterol (PROVENTIL;VENTOLIN) 90 MCG/ACT inhaler Inhale 2 puffs into the lungs every 6 hours as needed. tiotropium (SPIRIVA HANDIHALER) 18 MCG inhalation capsule Inhale 18 mcg into the lungs daily. ALLERGIES     has No Known Allergies. FAMILY HISTORY     indicated that his mother is . He indicated that his father is . He indicated that his sister is alive. He indicated that his brother is . family history includes Cancer in his brother, father, and mother. SOCIAL HISTORY      reports that he has been smoking cigarettes. He has a 9.75 pack-year smoking history. He has never used smokeless tobacco. He reports that he does not drink alcohol or use drugs. PHYSICAL EXAM     INITIAL VITALS:  weight is 160 lb (72.6 kg). His oral temperature is 97.6 °F (36.4 °C). His blood pressure is 144/70 (abnormal) and his pulse is 53. His respiration is 18 and oxygen saturation is 97%. Physical Exam   Constitutional: Patient is oriented to person, place, and time. Patient appears well-developed and well-nourished. Patient is active and cooperative. HENT:   Head: Normocephalic and atraumatic. Head is without contusion. Right Ear: Hearing and external ear normal. No drainage. Left Ear: Hearing and external ear normal. No drainage. Nose: Nose normal. No nasal deformity. No epistaxis. Mouth/Throat: Mucous membranes are not dry. Negative oral lesions or exudate  Eyes: EOMI. Conjunctivae, sclera, and lids are normal. Right eye exhibits no discharge. Left eye exhibits no discharge.    Neck: Full passive range of motion without pain and phonation normal.   Cardiovascular: Decreased rate, regular rhythm and intact distal pulses. No lower extremity edema. Pulses: Right radial pulse  2+   Pulmonary/Chest: Effort normal. No tachypnea and no bradypnea. Globally decreased air movement with some scattered expiratory wheezing  Abdominal: Soft. Patient without distension or tenderness  Musculoskeletal:   Negative acute trauma or deformity,  apparent full range of motion and normal strength all extremities appropriate to age. Neurological: Patient is alert and oriented to person, place, and time. patient displays no tremor. Patient displays no seizure activity. .    Skin: Skin is warm and dry. Patient is not diaphoretic. Psychiatric: Patient has a normal mood and affect. Patient speech is normal and behavior is normal. Cognition and memory are normal.      DIFFERENTIAL DIAGNOSIS:   Exacerbation COPD pneumonia bronchitis URI effusion PT X    DIAGNOSTIC RESULTS     EKG: All EKG's are interpreted by the Emergency Department Physician who either signs or Co-signs this chart in the absence of a cardiologist.  EKG    The patient had an EKG which is interpreted by me in the absence of a Cardiologist.   [] Without comparison to previous. [x] With comparison to a previous EKG Dated 1/7/2019    EKG @ 1832 hrs - sinus rhythm with marked sinus arrhythmia rate 60 normal axis normal intervals, as compared to prior no significant changes morphology    RADIOLOGY: non-plain film images(s) such as CT, Ultrasound and MRI are read by the radiologist.  XR CHEST STANDARD (2 VW)   Final Result      Nonacute two-view chest. Findings of mild to moderate COPD.              LABS:   Labs Reviewed   CBC WITH AUTO DIFFERENTIAL - Abnormal; Notable for the following components:       Result Value    RBC 4.28 (*)     Hemoglobin 12.4 (*)     Hematocrit 37.1 (*)     Monocytes 12 (*)     All other components within normal limits   BASIC METABOLIC PANEL W/ REFLEX TO MG FOR LOW K - Abnormal; Notable for the following components:    Glucose 125 (*)     All other components within normal limits       EMERGENCY DEPARTMENT COURSE:   Vitals:    Vitals:    05/13/19 1916 05/13/19 1925 05/13/19 1931 05/13/19 1946   BP: 131/67  110/72 (!) 144/70   Pulse:       Resp:  18     Temp:       TempSrc:       SpO2: 98%  96% 97%   Weight:         Patient history and physical exam taken at bedside, discussed patient's symptoms and exam findings as well as plan workup to include EKG chest x-ray blood work, Solu-Medrol IV, DuoNeb breathing treatment. Patient resting in bed semi-Fowlers, placed on cardiac monitor, pulse ox, O2, acknowledges    EKG and chest x-ray as above, initial lab work reviewed    Auscultation patient's lungs after first DuoNeb breathing treatment and does show some improvement, second breathing treatment ordered    Auscultation after second reading she may, patient significant improvement as compared to first presentation. Patient was ablated emergency room with pulse ox, did not get markedly short of breath or hypoxic     Discussed the patient diagnosis bronchitis leading to some COPD exacerbation, confirmed patient's allergies, I will place him on a five-day steroid burst, azithromycin 5 days, patient to continue with 3 treatments at home, close outpatient follow-up, return to ER symptoms change worsen or other concerns, acknowledges    FINAL IMPRESSION      1. Bronchitis    2.  COPD with exacerbation [facility])          DISPOSITION/PLAN   D/c    PATIENT REFERRED TO:  Bird Gordillo, DO  [address] [id number]    Call       HOSP General acute hospital ED  [address] [address]  [phone]    As needed, If symptoms worsen      DISCHARGE MEDICATIONS:  Discharge Medication List as of 5/13/2019  8:13 PM      START taking these medications    Details   azithromycin (ZITHROMAX) 250 MG tablet Two tablets initially then one tablet daily until finished, Disp-6 tablet, R-0Print

## 2019-09-17 ENCOUNTER — HOSPITAL ENCOUNTER (EMERGENCY)
Age: 69
Discharge: HOME | End: 2019-09-17
Attending: EMERGENCY MEDICINE
Payer: MEDICARE

## 2019-09-17 ENCOUNTER — APPOINTMENT (OUTPATIENT)
Dept: GENERAL RADIOLOGY | Age: 69
End: 2019-09-17
Payer: MEDICARE

## 2019-09-17 VITALS
WEIGHT: 160 LBS | OXYGEN SATURATION: 99 % | TEMPERATURE: 98 F | HEART RATE: 58 BPM | DIASTOLIC BLOOD PRESSURE: 64 MMHG | SYSTOLIC BLOOD PRESSURE: 113 MMHG | RESPIRATION RATE: 21 BRPM | BODY MASS INDEX: 21.7 KG/M2

## 2019-09-17 DIAGNOSIS — R07.9 CHEST PAIN, UNSPECIFIED TYPE: ICD-10-CM

## 2019-09-17 DIAGNOSIS — Z87.09 HISTORY OF COPD: ICD-10-CM

## 2019-09-17 DIAGNOSIS — I48.92 ATRIAL FLUTTER WITH RAPID VENTRICULAR RESPONSE (HCC): Primary | ICD-10-CM

## 2019-09-17 DIAGNOSIS — I48.92 ATRIAL FLUTTER, PAROXYSMAL (HCC): ICD-10-CM

## 2019-09-17 LAB
ABSOLUTE EOS #: 0.2 K/UL (ref 0–0.4)
ABSOLUTE IMMATURE GRANULOCYTE: ABNORMAL K/UL (ref 0–0.3)
ABSOLUTE LYMPH #: 1.8 K/UL (ref 1–4.8)
ABSOLUTE MONO #: 0.6 K/UL (ref 0–1)
ANION GAP SERPL CALCULATED.3IONS-SCNC: 10 MMOL/L (ref 9–17)
BASOPHILS # BLD: 2 % (ref 0–2)
BASOPHILS ABSOLUTE: 0.1 K/UL (ref 0–0.2)
BNP INTERPRETATION: ABNORMAL
BUN BLDV-MCNC: 14 MG/DL (ref 8–23)
BUN/CREAT BLD: 15 (ref 9–20)
CALCIUM SERPL-MCNC: 10.1 MG/DL (ref 8.6–10.4)
CHLORIDE BLD-SCNC: 100 MMOL/L (ref 98–107)
CO2: 31 MMOL/L (ref 20–31)
CREAT SERPL-MCNC: 0.95 MG/DL (ref 0.7–1.2)
DIFFERENTIAL TYPE: YES
EOSINOPHILS RELATIVE PERCENT: 3 % (ref 0–5)
GFR AFRICAN AMERICAN: >60 ML/MIN
GFR NON-AFRICAN AMERICAN: >60 ML/MIN
GFR SERPL CREATININE-BSD FRML MDRD: ABNORMAL ML/MIN/{1.73_M2}
GFR SERPL CREATININE-BSD FRML MDRD: ABNORMAL ML/MIN/{1.73_M2}
GLUCOSE BLD-MCNC: 127 MG/DL (ref 70–99)
HCT VFR BLD CALC: 40.3 % (ref 41–53)
HEMOGLOBIN: 13.4 G/DL (ref 13.5–17.5)
IMMATURE GRANULOCYTES: ABNORMAL %
LYMPHOCYTES # BLD: 26 % (ref 13–44)
MCH RBC QN AUTO: 29.3 PG (ref 26–34)
MCHC RBC AUTO-ENTMCNC: 33.2 G/DL (ref 31–37)
MCV RBC AUTO: 88.1 FL (ref 80–100)
MONOCYTES # BLD: 9 % (ref 5–9)
NRBC AUTOMATED: ABNORMAL PER 100 WBC
PDW BLD-RTO: 14.3 % (ref 12.1–15.2)
PLATELET # BLD: 175 K/UL (ref 140–450)
PLATELET ESTIMATE: ABNORMAL
PMV BLD AUTO: ABNORMAL FL (ref 6–12)
POTASSIUM SERPL-SCNC: 4.3 MMOL/L (ref 3.7–5.3)
PRO-BNP: 2934 PG/ML
RBC # BLD: 4.57 M/UL (ref 4.5–5.9)
RBC # BLD: ABNORMAL 10*6/UL
SEG NEUTROPHILS: 60 % (ref 39–75)
SEGMENTED NEUTROPHILS ABSOLUTE COUNT: 4.1 K/UL (ref 2.1–6.5)
SODIUM BLD-SCNC: 141 MMOL/L (ref 135–144)
TROPONIN INTERP: NORMAL
TROPONIN T: <0.03 NG/ML
TROPONIN, HIGH SENSITIVITY: NORMAL NG/L (ref 0–22)
WBC # BLD: 6.9 K/UL (ref 3.5–11)
WBC # BLD: ABNORMAL 10*3/UL

## 2019-09-17 PROCEDURE — 93005 ELECTROCARDIOGRAM TRACING: CPT | Performed by: EMERGENCY MEDICINE

## 2019-09-17 PROCEDURE — 83880 ASSAY OF NATRIURETIC PEPTIDE: CPT

## 2019-09-17 PROCEDURE — 96374 THER/PROPH/DIAG INJ IV PUSH: CPT

## 2019-09-17 PROCEDURE — 99285 EMERGENCY DEPT VISIT HI MDM: CPT

## 2019-09-17 PROCEDURE — 85025 COMPLETE CBC W/AUTO DIFF WBC: CPT

## 2019-09-17 PROCEDURE — 71046 X-RAY EXAM CHEST 2 VIEWS: CPT

## 2019-09-17 PROCEDURE — 84484 ASSAY OF TROPONIN QUANT: CPT

## 2019-09-17 PROCEDURE — 2500000003 HC RX 250 WO HCPCS: Performed by: EMERGENCY MEDICINE

## 2019-09-17 PROCEDURE — 80048 BASIC METABOLIC PNL TOTAL CA: CPT

## 2019-09-17 RX ORDER — DILTIAZEM HYDROCHLORIDE 5 MG/ML
10 INJECTION INTRAVENOUS ONCE
Status: COMPLETED | OUTPATIENT
Start: 2019-09-17 | End: 2019-09-17

## 2019-09-17 RX ADMIN — DILTIAZEM HYDROCHLORIDE 10 MG: 5 INJECTION INTRAVENOUS at 16:05

## 2019-09-17 ASSESSMENT — ENCOUNTER SYMPTOMS
VOMITING: 0
WHEEZING: 0
BACK PAIN: 0
SHORTNESS OF BREATH: 1
COUGH: 0
NAUSEA: 0
DIARRHEA: 0
CHEST TIGHTNESS: 0
ABDOMINAL PAIN: 0

## 2019-09-17 ASSESSMENT — PAIN DESCRIPTION - DESCRIPTORS: DESCRIPTORS: PRESSURE

## 2019-09-17 ASSESSMENT — PAIN DESCRIPTION - PAIN TYPE: TYPE: ACUTE PAIN

## 2019-09-17 ASSESSMENT — PAIN SCALES - GENERAL: PAINLEVEL_OUTOF10: 6

## 2019-09-17 ASSESSMENT — PAIN DESCRIPTION - LOCATION: LOCATION: CHEST

## 2019-09-17 ASSESSMENT — PAIN DESCRIPTION - FREQUENCY: FREQUENCY: CONTINUOUS

## 2019-09-17 NOTE — ED PROVIDER NOTES
None     Attends [employer] service: None     Active member of club or organization: None     Attends meetings of clubs or organizations: None     Relationship status: None    Intimate partner violence:     Fear of current or ex partner: None     Emotionally abused: None     Physically abused: None     Forced sexual activity: None   Other Topics Concern    None   Social History Narrative    None       SCREENINGS    [place] Coma Scale  Eye Opening: Spontaneous  Best Verbal Response: Oriented  Best Motor Response: Obeys commands  [place] Coma Scale Score: 15      PHYSICAL EXAM    (up to 7 forlevel 4, 8 or more for level 5)     ED Triage Vitals   BP Temp Temp src Pulse Resp SpO2 Height Weight   -- -- -- -- -- -- -- --     Vitals:    09/17/19 1556   BP:    Pulse:    Resp:    Temp: 98 °F (36.7 °C)   SpO2:        Physical Exam   Constitutional: He is oriented to person, place, and time. He appears well-developed and well-nourished. No distress. HENT:   Head: Normocephalic and atraumatic. Mouth/Throat: Oropharynx is clear and moist.   Eyes: Pupils are equal, round, and reactive to light. Conjunctivae and EOM are normal. Right eye exhibits no discharge. Left eye exhibits no discharge. Neck: Trachea normal, normal range of motion and full passive range of motion without pain. Neck supple. No spinous process tenderness and no muscular tenderness present. No neck rigidity. Cardiovascular: Normal rate, regular rhythm and normal heart sounds. Pulmonary/Chest: Effort normal. No accessory muscle usage or stridor. No respiratory distress. He has decreased breath sounds. He has no wheezes. He has no rales. He exhibits no tenderness. Abdominal: Soft. Bowel sounds are normal. He exhibits no distension and no mass. There is no tenderness. There is no rebound and no guarding. Musculoskeletal: Normal range of motion. He exhibits no edema, tenderness or deformity.    Neurological: He is alert and oriented to person, place, Prescriptions    DILTIAZEM (CARDIZEM) 30 MG TABLET    Take 1 tablet by mouth 2 times daily              Summation      Patient Course: EKG, Aflutter RVR rate 156. IV cardizem given. Converted to NSR rate 60. Spoke with cardiology. OK for discharge with cardizem 30mg daily to start tomorrow. ED Medications administered this visit:    Medications   diltiazem injection 10 mg (10 mg Intravenous Given 9/17/19 1605)       New Prescriptions from this visit:    New Prescriptions    DILTIAZEM (CARDIZEM) 30 MG TABLET    Take 1 tablet by mouth 2 times daily       Follow-up:  MD Trena White [phone]    Schedule an appointment as soon as possible for a visit in 10 days  If symptoms worsen return to ER. Dr Beata Foley office will be in touch with you to make an appt. Final Impression:  1. Atrial flutter with rapid ventricular response (HCC) Controlled   2. Atrial flutter, paroxysmal ([address]    3. History of COPD    4. Chest pain, unspecified type                   (Please note:  Portions of this note were completed with a voicerecognition program.  Efforts were made to edit the dictations but occasionally words and phrases are mis-transcribed.)  Form v2016. [id number].[id number]    Nicole Segovia DO (electronically signed)  Emergency Medicine Provider            Nicole Segovia DO  09/17/19 8403

## 2019-09-18 LAB
EKG ATRIAL RATE: 312 BPM
EKG ATRIAL RATE: 60 BPM
EKG P AXIS: 105 DEGREES
EKG P AXIS: 79 DEGREES
EKG P-R INTERVAL: 148 MS
EKG Q-T INTERVAL: 284 MS
EKG Q-T INTERVAL: 400 MS
EKG QRS DURATION: 70 MS
EKG QRS DURATION: 80 MS
EKG QTC CALCULATION (BAZETT): 400 MS
EKG QTC CALCULATION (BAZETT): 457 MS
EKG R AXIS: 88 DEGREES
EKG R AXIS: 91 DEGREES
EKG T AXIS: 84 DEGREES
EKG T AXIS: 86 DEGREES
EKG VENTRICULAR RATE: 156 BPM
EKG VENTRICULAR RATE: 60 BPM

## 2019-09-18 PROCEDURE — 93010 ELECTROCARDIOGRAM REPORT: CPT | Performed by: INTERNAL MEDICINE

## 2020-03-04 ENCOUNTER — APPOINTMENT (OUTPATIENT)
Dept: GENERAL RADIOLOGY | Age: 70
End: 2020-03-04
Payer: MEDICARE

## 2020-03-04 ENCOUNTER — HOSPITAL ENCOUNTER (EMERGENCY)
Age: 70
Discharge: HOME | End: 2020-03-04
Attending: EMERGENCY MEDICINE
Payer: MEDICARE

## 2020-03-04 VITALS
TEMPERATURE: 98.8 F | HEIGHT: 70 IN | SYSTOLIC BLOOD PRESSURE: 145 MMHG | BODY MASS INDEX: 23.95 KG/M2 | OXYGEN SATURATION: 94 % | DIASTOLIC BLOOD PRESSURE: 77 MMHG | RESPIRATION RATE: 16 BRPM | HEART RATE: 74 BPM | WEIGHT: 167.3 LBS

## 2020-03-04 PROCEDURE — 6370000000 HC RX 637 (ALT 250 FOR IP): Performed by: EMERGENCY MEDICINE

## 2020-03-04 PROCEDURE — 99283 EMERGENCY DEPT VISIT LOW MDM: CPT

## 2020-03-04 PROCEDURE — 94664 DEMO&/EVAL PT USE INHALER: CPT

## 2020-03-04 PROCEDURE — 71046 X-RAY EXAM CHEST 2 VIEWS: CPT

## 2020-03-04 RX ORDER — IPRATROPIUM BROMIDE AND ALBUTEROL SULFATE 2.5; .5 MG/3ML; MG/3ML
1 SOLUTION RESPIRATORY (INHALATION) ONCE
Status: COMPLETED | OUTPATIENT
Start: 2020-03-04 | End: 2020-03-04

## 2020-03-04 RX ORDER — PREDNISONE 20 MG/1
40 TABLET ORAL DAILY
Qty: 10 TABLET | Refills: 0 | Status: SHIPPED | OUTPATIENT
Start: 2020-03-04 | End: 2020-03-09

## 2020-03-04 RX ORDER — DOXYCYCLINE 100 MG/1
100 CAPSULE ORAL 2 TIMES DAILY
Qty: 20 CAPSULE | Refills: 0 | Status: SHIPPED | OUTPATIENT
Start: 2020-03-04 | End: 2020-11-07

## 2020-03-04 RX ORDER — OSELTAMIVIR PHOSPHATE 75 MG/1
75 CAPSULE ORAL 2 TIMES DAILY
Qty: 10 CAPSULE | Refills: 0 | Status: SHIPPED | OUTPATIENT
Start: 2020-03-04 | End: 2020-03-09

## 2020-03-04 RX ADMIN — IPRATROPIUM BROMIDE AND ALBUTEROL SULFATE 1 AMPULE: .5; 3 SOLUTION RESPIRATORY (INHALATION) at 18:55

## 2020-03-04 NOTE — ED PROVIDER NOTES
EMERGENCY DEPARTMENT ENCOUNTER      CHIEF COMPLAINT    Chief Complaint   Patient presents with    Cough     cough, congestion for past few days, g/f was just diagnosed with influenza a today.  Influenza       HPI    Tim Roman is a 68 y.o. male who presentsto ED from home. By car. With complaint of cough, congestion. Patient has Hx of COPD. Onset x 2 days. Intensity of symptoms moderate. Patient has history of COPD. Patient has been exposed to influenza. Patient has been having productive cough for the past 2 days. PAST MEDICAL HISTORY    Past Medical History:   Diagnosis Date    Abdominal aneurysm ([address]     x2    Aortic aneurysm (HCC)     Atrial fibrillation (HCC)     Chicken pox     Chronic back pain     COPD (chronic obstructive pulmonary disease) (HCC)     Emphysema of lung (HCC)     [facility] [address]     Hyperlipidemia     Hypertension 2015    Osteoarthritis        SURGICAL HISTORY    Past Surgical History:   Procedure Laterality Date    CARDIAC CATHETERIZATION Left 08/05/15    Dr. Diana Leonard 30% disease of the left main trunck, Mild plaque disease in the coronary arteries.   Normal LV function, ejection fraction of 60%    EYE SURGERY         CURRENT MEDICATIONS    Current Outpatient Rx   Medication Sig Dispense Refill    doxycycline monohydrate (MONODOX) 100 MG capsule Take 1 capsule by mouth 2 times daily 20 capsule 0    predniSONE (DELTASONE) 20 MG tablet Take 2 tablets by mouth daily for 5 doses 10 tablet 0    oseltamivir (TAMIFLU) 75 MG capsule Take 1 capsule by mouth 2 times daily for 10 doses 10 capsule 0    diltiazem (CARDIZEM) 30 MG tablet Take 1 tablet by mouth 2 times daily 60 tablet 0    Cholecalciferol (VITAMIN D3) 2000 units CAPS Take 2,000 Units by mouth daily      albuterol (PROVENTIL) (2.5 MG/3ML) 0.083% nebulizer solution Take 2.5 mg by nebulization every 6 hours as needed for Wheezing      fluticasone (FLONASE) 50 MCG/ACT nasal spray 1 Mu-[employer] service: None     Active member of club or organization: None     Attends meetings of clubs or organizations: None     Relationship status: None    Intimate partner violence:     Fear of current or ex partner: None     Emotionally abused: None     Physically abused: None     Forced sexual activity: None   Other Topics Concern    None   Social History Narrative    None           Review of Systems:  Constitutional:  Denies fever, chills, weight loss or weakness   Eyes:  Denies photophobia or discharge   HENT:  Denies sore throat or ear pain   Respiratory: Positive for cough and shortness of breath. Cardiovascular:  Denies chest pain, palpitations or swelling   GI:  Denies abdominal pain, nausea, vomiting, or diarrhea   Musculoskeletal:  Denies back pain   Skin:  Denies rash   Neurologic:  Denies headache, focal weakness or sensory changes   Endocrine:  Denies polyuria or polydypsia   Lymphatic:  Denies swollen glands   Psychiatric:  Denies depression, suicidal ideation or homicidal ideation   All systems negative except as marked. PHYSICAL EXAM    VITAL SIGNS: BP (!) 164/79   Pulse 74   Temp 98.8 °F (37.1 °C) (Oral)   Resp 20   Ht 5' 10\" (1.778 m)   Wt 167 lb 4.8 oz (75.9 kg)   SpO2 99%   BMI 24.01 kg/m²    Constitutional: Elderly male with shortness of breath  HENT:  Normocephalic, Atraumatic, Bilateral external ears normal, Oropharynx moist, No oral exudates, Nose normal. Neck- Normal range of motion, No tenderness, Supple, No stridor. Eyes:  PERRL, EOMI, Conjunctiva normal, No discharge. Respiratory:  Normal breath sounds, No respiratory distress, No wheezing, No chest tenderness. Cardiovascular:  Normal heart rate, Normal rhythm, No murmurs, No rubs, No gallops. GI:  Bowel sounds normal, Soft, No tenderness, No masses, No pulsatile masses. : External genitalia appear normal, No masses or lesions. No discharge. No CVA tenderness.    Musculoskeletal:  Intact distal pulses, No edema, No tenderness, No cyanosis, No clubbing. Good range of motion in all major joints. No tenderness to palpation or major deformities noted. Back- No tenderness. Integument:  Warm, Dry, No erythema, No rash. Lymphatic:  No lymphadenopathy noted. Neurologic:  Alert & oriented x 3, Normal motor function, Normal sensory function, No focal deficits noted. Psychiatric:  Affect normal, Judgment normal, Mood normal.     EKG        RADIOLOGY    XR CHEST STANDARD (2 VW)    (Results Pending)       PROCEDURES        Labs  Labs Reviewed - No data to display          Summation      Patient Course: Patient will be sent home on doxycycline and prednisone. Patient will be prescribed Tamiflu. ED Medications administered this visit:    Medications   ipratropium-albuterol (DUONEB) nebulizer solution 1 ampule (has no administration in time range)       New Prescriptions from this visit:    New Prescriptions    DOXYCYCLINE MONOHYDRATE (MONODOX) 100 MG CAPSULE    Take 1 capsule by mouth 2 times daily    OSELTAMIVIR (TAMIFLU) 75 MG CAPSULE    Take 1 capsule by mouth 2 times daily for 10 doses    PREDNISONE (DELTASONE) 20 MG TABLET    Take 2 tablets by mouth daily for 5 doses       Follow-up:  No follow-up provider specified. Final Impression:   1.  COPD exacerbation ([facility] [address]               (Please note that portions of this note were completed with a voice recognition program.  Efforts were made to edit the dictations but occasionally words are mis-transcribed.)          Ashwin Greene MD  03/04/20 2947

## 2020-03-10 ENCOUNTER — HOSPITAL ENCOUNTER (OUTPATIENT)
Age: 70
Setting detail: OBSERVATION
Discharge: HOME | End: 2020-03-11
Attending: EMERGENCY MEDICINE | Admitting: INTERNAL MEDICINE
Payer: MEDICARE

## 2020-03-10 ENCOUNTER — APPOINTMENT (OUTPATIENT)
Dept: GENERAL RADIOLOGY | Age: 70
End: 2020-03-10
Payer: MEDICARE

## 2020-03-10 PROBLEM — I48.0 PAROXYSMAL ATRIAL FIBRILLATION (HCC): Status: ACTIVE | Noted: 2020-03-10

## 2020-03-10 LAB
ABSOLUTE EOS #: 0.1 K/UL (ref 0–0.4)
ABSOLUTE IMMATURE GRANULOCYTE: ABNORMAL K/UL (ref 0–0.3)
ABSOLUTE LYMPH #: 2 K/UL (ref 1–4.8)
ABSOLUTE MONO #: 0.7 K/UL (ref 0–1)
ANION GAP SERPL CALCULATED.3IONS-SCNC: 11 MMOL/L (ref 9–17)
BASOPHILS # BLD: 0 % (ref 0–2)
BASOPHILS ABSOLUTE: 0 K/UL (ref 0–0.2)
BNP INTERPRETATION: ABNORMAL
BUN BLDV-MCNC: 19 MG/DL (ref 8–23)
BUN/CREAT BLD: 21 (ref 9–20)
CALCIUM SERPL-MCNC: 9.9 MG/DL (ref 8.6–10.4)
CHLORIDE BLD-SCNC: 93 MMOL/L (ref 98–107)
CO2: 34 MMOL/L (ref 20–31)
CREAT SERPL-MCNC: 0.91 MG/DL (ref 0.7–1.2)
DIFFERENTIAL TYPE: YES
DIRECT EXAM: NORMAL
DIRECT EXAM: NORMAL
EOSINOPHILS RELATIVE PERCENT: 1 % (ref 0–5)
GFR AFRICAN AMERICAN: >60 ML/MIN
GFR NON-AFRICAN AMERICAN: >60 ML/MIN
GFR SERPL CREATININE-BSD FRML MDRD: ABNORMAL ML/MIN/{1.73_M2}
GFR SERPL CREATININE-BSD FRML MDRD: ABNORMAL ML/MIN/{1.73_M2}
GLUCOSE BLD-MCNC: 131 MG/DL (ref 70–99)
HCT VFR BLD CALC: 39.4 % (ref 41–53)
HEMOGLOBIN: 12.7 G/DL (ref 13.5–17.5)
IMMATURE GRANULOCYTES: ABNORMAL %
LYMPHOCYTES # BLD: 24 % (ref 13–44)
Lab: NORMAL
MCH RBC QN AUTO: 28 PG (ref 26–34)
MCHC RBC AUTO-ENTMCNC: 32.2 G/DL (ref 31–37)
MCV RBC AUTO: 87.1 FL (ref 80–100)
MONOCYTES # BLD: 9 % (ref 5–9)
NRBC AUTOMATED: ABNORMAL PER 100 WBC
PDW BLD-RTO: 14.2 % (ref 12.1–15.2)
PLATELET # BLD: 195 K/UL (ref 140–450)
PLATELET ESTIMATE: ABNORMAL
PMV BLD AUTO: ABNORMAL FL (ref 6–12)
POTASSIUM SERPL-SCNC: 4.8 MMOL/L (ref 3.7–5.3)
PRO-BNP: 4855 PG/ML
RBC # BLD: 4.53 M/UL (ref 4.5–5.9)
RBC # BLD: ABNORMAL 10*6/UL
SEG NEUTROPHILS: 66 % (ref 39–75)
SEGMENTED NEUTROPHILS ABSOLUTE COUNT: 5.5 K/UL (ref 2.1–6.5)
SODIUM BLD-SCNC: 138 MMOL/L (ref 135–144)
SPECIMEN DESCRIPTION: NORMAL
TROPONIN INTERP: NORMAL
TROPONIN INTERP: NORMAL
TROPONIN T: <0.03 NG/ML
TROPONIN T: <0.03 NG/ML
TROPONIN, HIGH SENSITIVITY: NORMAL NG/L (ref 0–22)
TROPONIN, HIGH SENSITIVITY: NORMAL NG/L (ref 0–22)
WBC # BLD: 8.3 K/UL (ref 3.5–11)
WBC # BLD: ABNORMAL 10*3/UL

## 2020-03-10 PROCEDURE — 2580000003 HC RX 258: Performed by: INTERNAL MEDICINE

## 2020-03-10 PROCEDURE — 85025 COMPLETE CBC W/AUTO DIFF WBC: CPT

## 2020-03-10 PROCEDURE — 84484 ASSAY OF TROPONIN QUANT: CPT

## 2020-03-10 PROCEDURE — 36415 COLL VENOUS BLD VENIPUNCTURE: CPT

## 2020-03-10 PROCEDURE — 87804 INFLUENZA ASSAY W/OPTIC: CPT

## 2020-03-10 PROCEDURE — 96376 TX/PRO/DX INJ SAME DRUG ADON: CPT

## 2020-03-10 PROCEDURE — 83880 ASSAY OF NATRIURETIC PEPTIDE: CPT

## 2020-03-10 PROCEDURE — 2580000003 HC RX 258: Performed by: EMERGENCY MEDICINE

## 2020-03-10 PROCEDURE — 2700000000 HC OXYGEN THERAPY PER DAY

## 2020-03-10 PROCEDURE — 6370000000 HC RX 637 (ALT 250 FOR IP): Performed by: INTERNAL MEDICINE

## 2020-03-10 PROCEDURE — 80048 BASIC METABOLIC PNL TOTAL CA: CPT

## 2020-03-10 PROCEDURE — 6370000000 HC RX 637 (ALT 250 FOR IP): Performed by: EMERGENCY MEDICINE

## 2020-03-10 PROCEDURE — 6360000002 HC RX W HCPCS: Performed by: INTERNAL MEDICINE

## 2020-03-10 PROCEDURE — G0378 HOSPITAL OBSERVATION PER HR: HCPCS

## 2020-03-10 PROCEDURE — 96365 THER/PROPH/DIAG IV INF INIT: CPT

## 2020-03-10 PROCEDURE — 94664 DEMO&/EVAL PT USE INHALER: CPT

## 2020-03-10 PROCEDURE — 2500000003 HC RX 250 WO HCPCS: Performed by: EMERGENCY MEDICINE

## 2020-03-10 PROCEDURE — 93005 ELECTROCARDIOGRAM TRACING: CPT | Performed by: EMERGENCY MEDICINE

## 2020-03-10 PROCEDURE — 94640 AIRWAY INHALATION TREATMENT: CPT

## 2020-03-10 PROCEDURE — 96375 TX/PRO/DX INJ NEW DRUG ADDON: CPT

## 2020-03-10 PROCEDURE — 6360000002 HC RX W HCPCS

## 2020-03-10 PROCEDURE — 94761 N-INVAS EAR/PLS OXIMETRY MLT: CPT

## 2020-03-10 PROCEDURE — 99285 EMERGENCY DEPT VISIT HI MDM: CPT

## 2020-03-10 PROCEDURE — 71045 X-RAY EXAM CHEST 1 VIEW: CPT

## 2020-03-10 RX ORDER — ONDANSETRON 2 MG/ML
4 INJECTION INTRAMUSCULAR; INTRAVENOUS EVERY 6 HOURS PRN
Status: DISCONTINUED | OUTPATIENT
Start: 2020-03-10 | End: 2020-03-11 | Stop reason: HOSPADM

## 2020-03-10 RX ORDER — DILTIAZEM HYDROCHLORIDE 120 MG/1
120 CAPSULE, COATED, EXTENDED RELEASE ORAL DAILY
Status: DISCONTINUED | OUTPATIENT
Start: 2020-03-11 | End: 2020-03-11 | Stop reason: HOSPADM

## 2020-03-10 RX ORDER — DILTIAZEM HYDROCHLORIDE 5 MG/ML
10 INJECTION INTRAVENOUS ONCE
Status: COMPLETED | OUTPATIENT
Start: 2020-03-10 | End: 2020-03-10

## 2020-03-10 RX ORDER — LEVALBUTEROL INHALATION SOLUTION 1.25 MG/3ML
SOLUTION RESPIRATORY (INHALATION)
Status: COMPLETED
Start: 2020-03-10 | End: 2020-03-10

## 2020-03-10 RX ORDER — METHYLPREDNISOLONE SODIUM SUCCINATE 40 MG/ML
40 INJECTION, POWDER, LYOPHILIZED, FOR SOLUTION INTRAMUSCULAR; INTRAVENOUS EVERY 12 HOURS
Status: DISCONTINUED | OUTPATIENT
Start: 2020-03-10 | End: 2020-03-11 | Stop reason: HOSPADM

## 2020-03-10 RX ORDER — SODIUM CHLORIDE 0.9 % (FLUSH) 0.9 %
10 SYRINGE (ML) INJECTION PRN
Status: DISCONTINUED | OUTPATIENT
Start: 2020-03-10 | End: 2020-03-11 | Stop reason: HOSPADM

## 2020-03-10 RX ORDER — LEVALBUTEROL INHALATION SOLUTION 1.25 MG/3ML
1.25 SOLUTION RESPIRATORY (INHALATION)
Status: DISCONTINUED | OUTPATIENT
Start: 2020-03-11 | End: 2020-03-11 | Stop reason: HOSPADM

## 2020-03-10 RX ORDER — POLYETHYLENE GLYCOL 3350 17 G/17G
17 POWDER, FOR SOLUTION ORAL DAILY PRN
Status: DISCONTINUED | OUTPATIENT
Start: 2020-03-10 | End: 2020-03-11 | Stop reason: HOSPADM

## 2020-03-10 RX ORDER — LEVALBUTEROL INHALATION SOLUTION 1.25 MG/3ML
1.25 SOLUTION RESPIRATORY (INHALATION) ONCE
Status: DISCONTINUED | OUTPATIENT
Start: 2020-03-10 | End: 2020-03-10

## 2020-03-10 RX ORDER — ACETAMINOPHEN 325 MG/1
650 TABLET ORAL EVERY 6 HOURS PRN
Status: DISCONTINUED | OUTPATIENT
Start: 2020-03-10 | End: 2020-03-11 | Stop reason: HOSPADM

## 2020-03-10 RX ORDER — DOXYCYCLINE HYCLATE 100 MG
100 TABLET ORAL 2 TIMES DAILY
Status: DISCONTINUED | OUTPATIENT
Start: 2020-03-10 | End: 2020-03-11 | Stop reason: HOSPADM

## 2020-03-10 RX ORDER — DILTIAZEM HYDROCHLORIDE 120 MG/1
120 CAPSULE, COATED, EXTENDED RELEASE ORAL DAILY
Status: DISCONTINUED | OUTPATIENT
Start: 2020-03-10 | End: 2020-03-11 | Stop reason: SDUPTHER

## 2020-03-10 RX ORDER — VITAMIN B COMPLEX
2000 TABLET ORAL DAILY
Status: DISCONTINUED | OUTPATIENT
Start: 2020-03-11 | End: 2020-03-11 | Stop reason: HOSPADM

## 2020-03-10 RX ORDER — PROMETHAZINE HYDROCHLORIDE 25 MG/1
12.5 TABLET ORAL EVERY 6 HOURS PRN
Status: DISCONTINUED | OUTPATIENT
Start: 2020-03-10 | End: 2020-03-11 | Stop reason: HOSPADM

## 2020-03-10 RX ORDER — SODIUM CHLORIDE 0.9 % (FLUSH) 0.9 %
10 SYRINGE (ML) INJECTION EVERY 12 HOURS SCHEDULED
Status: DISCONTINUED | OUTPATIENT
Start: 2020-03-10 | End: 2020-03-11 | Stop reason: HOSPADM

## 2020-03-10 RX ORDER — ROSUVASTATIN CALCIUM 20 MG/1
20 TABLET, COATED ORAL DAILY
Status: DISCONTINUED | OUTPATIENT
Start: 2020-03-11 | End: 2020-03-11 | Stop reason: HOSPADM

## 2020-03-10 RX ORDER — CARVEDILOL 12.5 MG/1
12.5 TABLET ORAL 2 TIMES DAILY WITH MEALS
Status: DISCONTINUED | OUTPATIENT
Start: 2020-03-11 | End: 2020-03-11 | Stop reason: HOSPADM

## 2020-03-10 RX ORDER — FLUTICASONE PROPIONATE 50 MCG
1 SPRAY, SUSPENSION (ML) NASAL DAILY
Status: DISCONTINUED | OUTPATIENT
Start: 2020-03-11 | End: 2020-03-11 | Stop reason: HOSPADM

## 2020-03-10 RX ORDER — ACETAMINOPHEN 650 MG/1
650 SUPPOSITORY RECTAL EVERY 6 HOURS PRN
Status: DISCONTINUED | OUTPATIENT
Start: 2020-03-10 | End: 2020-03-11 | Stop reason: HOSPADM

## 2020-03-10 RX ORDER — ASPIRIN 81 MG/1
81 TABLET, CHEWABLE ORAL DAILY
Status: DISCONTINUED | OUTPATIENT
Start: 2020-03-11 | End: 2020-03-11 | Stop reason: HOSPADM

## 2020-03-10 RX ADMIN — LEVALBUTEROL HYDROCHLORIDE 1.25 MG: 1.25 SOLUTION RESPIRATORY (INHALATION) at 21:26

## 2020-03-10 RX ADMIN — DILTIAZEM HYDROCHLORIDE 120 MG: 120 CAPSULE, COATED, EXTENDED RELEASE ORAL at 20:03

## 2020-03-10 RX ADMIN — MOMETASONE FUROATE AND FORMOTEROL FUMARATE DIHYDRATE 2 PUFF: 200; 5 AEROSOL RESPIRATORY (INHALATION) at 21:25

## 2020-03-10 RX ADMIN — METHYLPREDNISOLONE SODIUM SUCCINATE 40 MG: 40 INJECTION, POWDER, FOR SOLUTION INTRAMUSCULAR; INTRAVENOUS at 23:48

## 2020-03-10 RX ADMIN — LEVALBUTEROL INHALATION SOLUTION 1.25 MG: 1.25 SOLUTION RESPIRATORY (INHALATION) at 21:26

## 2020-03-10 RX ADMIN — DILTIAZEM HYDROCHLORIDE 10 MG: 5 INJECTION INTRAVENOUS at 18:31

## 2020-03-10 RX ADMIN — DILTIAZEM HYDROCHLORIDE 5 MG/HR: 5 INJECTION INTRAVENOUS at 18:33

## 2020-03-10 RX ADMIN — Medication 10 ML: at 23:48

## 2020-03-10 ASSESSMENT — PAIN SCALES - GENERAL: PAINLEVEL_OUTOF10: 0

## 2020-03-10 NOTE — ED TRIAGE NOTES
When asked for a medication list, patient wife states \"it should be in the computer\". This RN asked made pt and family aware that I need to know when the last time and if anything has changed. Pt wife states \"No nothing has changed and it should be whatever is in the computer, he took all his meds today.

## 2020-03-10 NOTE — ED PROVIDER NOTES
[address] COMPLAINT    Chief Complaint   Patient presents with    Shortness of Breath     shortness of breath x 2 days. Pt states HR is 154 bpm. Denies any pain    Tachycardia       HPI    J Carlos Koo is a 68 y.o. male who presentsto ED from home. By car. With complaint of rapid HR, shortness of breath. Onset 3:30 pm.  Patient was seen on March 4 and was diagnosed with COPD exacerbation. Patient sent home on prednisone and doxycycline. Patient states that he was generally feeling better until this afternoon. Patient started having palpitations. She has palpitations and shortness of breath. Patient denies chest pain. PAST MEDICAL HISTORY    Past Medical History:   Diagnosis Date    Abdominal aneurysm ([facility] [address]     x2    Aortic aneurysm (HCC)     Atrial fibrillation (HCC)     Chicken pox     Chronic back pain     COPD (chronic obstructive pulmonary disease) (HCC)     Emphysema of lung (HCC)     Emphysema of lung ([facility] [address]     Hyperlipidemia     Hypertension 2015    Osteoarthritis        SURGICAL HISTORY    Past Surgical History:   Procedure Laterality Date    CARDIAC CATHETERIZATION Left 08/05/15    Dr. Venkat Person 30% disease of the left main trunck, Mild plaque disease in the coronary arteries.   Normal LV function, ejection fraction of 60%    EYE SURGERY         CURRENT MEDICATIONS        ALLERGIES    No Known Allergies    FAMILY HISTORY    Family History   Problem Relation Age of Onset    Cancer Mother         Pancreatic cancer    Cancer Father         Lung    Cancer Brother         Brain cancer, pancreatic cancer, colon cancer       SOCIAL HISTORY    Social History     Socioeconomic History    Marital status:      Spouse name: None    Number of children: None    Years of education: None    Highest education level: None   Occupational History    None   Social Needs    Financial resource strain: None    Food insecurity     Worry: 96%   BMI 24.02 kg/m²    Constitutional: Elderly male in no acute distress hENT:  Normocephalic, Atraumatic, Bilateral external ears normal, Oropharynx moist, No oral exudates, Nose normal. Neck- Normal range of motion, No tenderness, Supple, No stridor. Eyes:  PERRL, EOMI, Conjunctiva normal, No discharge. Respiratory: Shortness of breath with diminished breath sounds bilaterally history of COPD. Bilateral expiratory wheezes. .   Cardiovascular: Regular tachycardic rate GI:  Bowel sounds normal, Soft, No tenderness, No masses, No pulsatile masses. : External genitalia appear normal, No masses or lesions. No discharge. No CVA tenderness. Musculoskeletal:  Intact distal pulses, No edema, No tenderness, No cyanosis, No clubbing. Good range of motion in all major joints. No tenderness to palpation or major deformities noted. Back- No tenderness. Integument:  Warm, Dry, No erythema, No rash. Lymphatic:  No lymphadenopathy noted. Neurologic:  Alert & oriented x 3, Normal motor function, Normal sensory function, No focal deficits noted. Psychiatric:  Affect normal, Judgment normal, Mood normal.     EKG    #1 Atrial flutter 2-1 conduction rate of 150  #2  Atrial flutter 4- 1 conduction rate in the 80s  #3  Sinus rhythm in the 50s  RADIOLOGY    XR CHEST PORTABLE   Final Result      COPD. No acute cardiopulmonary disease.                       PROCEDURES        Labs  Labs Reviewed   BASIC METABOLIC PANEL - Abnormal; Notable for the following components:       Result Value    Glucose 131 (*)     Bun/Cre Ratio 21 (*)     Chloride 93 (*)     CO2 34 (*)     All other components within normal limits   BRAIN NATRIURETIC PEPTIDE - Abnormal; Notable for the following components:    Pro-BNP 4,855 (*)     All other components within normal limits   CBC WITH AUTO DIFFERENTIAL - Abnormal; Notable for the following components:    Hemoglobin 12.7 (*)     Hematocrit 39.4 (*)     All other components within normal limits BASIC METABOLIC PANEL W/ REFLEX TO MG FOR LOW K - Abnormal; Notable for the following components:    Glucose 159 (*)     CO2 34 (*)     Anion Gap 8 (*)     All other components within normal limits   RAPID INFLUENZA A/B ANTIGENS   TROPONIN   TROPONIN   TROPONIN   TROPONIN             Summation      Patient Course: Patient was given Cardizem 10 mg bolus and Cardizem drip. Patient converted from atrial flutter to from 2-1 conduction to 4-1 conduction. Patient spontaneously converted to sinus rhythm. Patient remained stable condition stable vital signs. Patient given Cardizem . Patient is discussed with Dr. Christophe Williamson, and will be made for observation.    ED Medications administered this visit:    Medications   dilTIAZem injection 10 mg (0 mg Intravenous Stop Time 3/10/20 2001)     Followed by   dilTIAZem 125 mg in dextrose 5 % 125 mL infusion (0 mg/hr Intravenous Stopped 3/10/20 2003)   dilTIAZem (CARDIZEM CD) extended release capsule 120 mg (120 mg Oral Given 3/10/20 2003)   aspirin chewable tablet 81 mg (has no administration in time range)   mometasone-formoterol (DULERA) 200-5 MCG/ACT inhaler 2 puff (2 puffs Inhalation Given 3/10/20 2125)   carvedilol (COREG) tablet 12.5 mg (has no administration in time range)   Vitamin D (CHOLECALCIFEROL) tablet 2,000 Units (has no administration in time range)   doxycycline hyclate (VIBRA-TABS) tablet 100 mg (100 mg Oral Not Given 3/10/20 2222)   fluticasone (FLONASE) 50 MCG/ACT nasal spray 1 spray (has no administration in time range)   rosuvastatin (CRESTOR) tablet 20 mg (has no administration in time range)   tiotropium (SPIRIVA) inhalation capsule 18 mcg (has no administration in time range)   dilTIAZem (CARDIZEM CD) extended release capsule 120 mg (has no administration in time range)   levalbuterol (XOPENEX) nebulizer solution 1.25 mg (1.25 mg Nebulization Given 3/11/20 2982)   sodium chloride flush 0.9 % injection 10 mL (10 mLs Intravenous Given 3/10/20 4590)

## 2020-03-11 VITALS
OXYGEN SATURATION: 94 % | WEIGHT: 167.4 LBS | TEMPERATURE: 97.4 F | DIASTOLIC BLOOD PRESSURE: 75 MMHG | RESPIRATION RATE: 20 BRPM | SYSTOLIC BLOOD PRESSURE: 136 MMHG | BODY MASS INDEX: 23.96 KG/M2 | HEIGHT: 70 IN | HEART RATE: 58 BPM

## 2020-03-11 LAB
ANION GAP SERPL CALCULATED.3IONS-SCNC: 8 MMOL/L (ref 9–17)
BUN BLDV-MCNC: 16 MG/DL (ref 8–23)
BUN/CREAT BLD: 20 (ref 9–20)
CALCIUM SERPL-MCNC: 9.7 MG/DL (ref 8.6–10.4)
CHLORIDE BLD-SCNC: 99 MMOL/L (ref 98–107)
CO2: 34 MMOL/L (ref 20–31)
CREAT SERPL-MCNC: 0.81 MG/DL (ref 0.7–1.2)
EKG ATRIAL RATE: 312 BPM
EKG ATRIAL RATE: 336 BPM
EKG ATRIAL RATE: 52 BPM
EKG P AXIS: 175 DEGREES
EKG P AXIS: 180 DEGREES
EKG P AXIS: 84 DEGREES
EKG P-R INTERVAL: 138 MS
EKG Q-T INTERVAL: 242 MS
EKG Q-T INTERVAL: 346 MS
EKG Q-T INTERVAL: 414 MS
EKG QRS DURATION: 114 MS
EKG QRS DURATION: 78 MS
EKG QRS DURATION: 84 MS
EKG QTC CALCULATION (BAZETT): 385 MS
EKG QTC CALCULATION (BAZETT): 390 MS
EKG QTC CALCULATION (BAZETT): 408 MS
EKG R AXIS: 68 DEGREES
EKG R AXIS: 69 DEGREES
EKG R AXIS: 69 DEGREES
EKG T AXIS: 70 DEGREES
EKG T AXIS: 81 DEGREES
EKG T AXIS: 88 DEGREES
EKG VENTRICULAR RATE: 156 BPM
EKG VENTRICULAR RATE: 52 BPM
EKG VENTRICULAR RATE: 84 BPM
GFR AFRICAN AMERICAN: >60 ML/MIN
GFR NON-AFRICAN AMERICAN: >60 ML/MIN
GFR SERPL CREATININE-BSD FRML MDRD: ABNORMAL ML/MIN/{1.73_M2}
GFR SERPL CREATININE-BSD FRML MDRD: ABNORMAL ML/MIN/{1.73_M2}
GLUCOSE BLD-MCNC: 159 MG/DL (ref 70–99)
POTASSIUM SERPL-SCNC: 4.9 MMOL/L (ref 3.7–5.3)
SODIUM BLD-SCNC: 141 MMOL/L (ref 135–144)
TROPONIN INTERP: NORMAL
TROPONIN T: <0.03 NG/ML
TROPONIN, HIGH SENSITIVITY: NORMAL NG/L (ref 0–22)

## 2020-03-11 PROCEDURE — 93010 ELECTROCARDIOGRAM REPORT: CPT | Performed by: INTERNAL MEDICINE

## 2020-03-11 PROCEDURE — 2700000000 HC OXYGEN THERAPY PER DAY

## 2020-03-11 PROCEDURE — 6360000002 HC RX W HCPCS: Performed by: INTERNAL MEDICINE

## 2020-03-11 PROCEDURE — 84484 ASSAY OF TROPONIN QUANT: CPT

## 2020-03-11 PROCEDURE — 80048 BASIC METABOLIC PNL TOTAL CA: CPT

## 2020-03-11 PROCEDURE — 2580000003 HC RX 258: Performed by: INTERNAL MEDICINE

## 2020-03-11 PROCEDURE — 36415 COLL VENOUS BLD VENIPUNCTURE: CPT

## 2020-03-11 PROCEDURE — 6370000000 HC RX 637 (ALT 250 FOR IP): Performed by: INTERNAL MEDICINE

## 2020-03-11 PROCEDURE — 94761 N-INVAS EAR/PLS OXIMETRY MLT: CPT

## 2020-03-11 PROCEDURE — 6360000002 HC RX W HCPCS

## 2020-03-11 PROCEDURE — G0378 HOSPITAL OBSERVATION PER HR: HCPCS

## 2020-03-11 PROCEDURE — 96376 TX/PRO/DX INJ SAME DRUG ADON: CPT

## 2020-03-11 PROCEDURE — 94640 AIRWAY INHALATION TREATMENT: CPT

## 2020-03-11 RX ORDER — PANTOPRAZOLE SODIUM 40 MG/1
40 TABLET, DELAYED RELEASE ORAL
Status: DISCONTINUED | OUTPATIENT
Start: 2020-03-11 | End: 2020-03-11 | Stop reason: HOSPADM

## 2020-03-11 RX ORDER — LEVALBUTEROL INHALATION SOLUTION 1.25 MG/3ML
SOLUTION RESPIRATORY (INHALATION)
Status: COMPLETED
Start: 2020-03-11 | End: 2020-03-11

## 2020-03-11 RX ORDER — PREDNISONE 20 MG/1
20 TABLET ORAL 2 TIMES DAILY
Qty: 10 TABLET | Refills: 0 | Status: SHIPPED | OUTPATIENT
Start: 2020-03-11 | End: 2020-03-16

## 2020-03-11 RX ORDER — OMEPRAZOLE 20 MG/1
20 CAPSULE, DELAYED RELEASE ORAL 2 TIMES DAILY
COMMUNITY

## 2020-03-11 RX ADMIN — CARVEDILOL 12.5 MG: 12.5 TABLET, FILM COATED ORAL at 09:00

## 2020-03-11 RX ADMIN — PANTOPRAZOLE SODIUM 40 MG: 40 TABLET, DELAYED RELEASE ORAL at 10:25

## 2020-03-11 RX ADMIN — LEVALBUTEROL HYDROCHLORIDE 1.25 MG: 1.25 SOLUTION RESPIRATORY (INHALATION) at 09:48

## 2020-03-11 RX ADMIN — METHYLPREDNISOLONE SODIUM SUCCINATE 40 MG: 40 INJECTION, POWDER, FOR SOLUTION INTRAMUSCULAR; INTRAVENOUS at 09:01

## 2020-03-11 RX ADMIN — Medication 2000 UNITS: at 09:00

## 2020-03-11 RX ADMIN — ASPIRIN 81 MG 81 MG: 81 TABLET ORAL at 09:01

## 2020-03-11 RX ADMIN — LEVALBUTEROL HYDROCHLORIDE 1.25 MG: 1.25 SOLUTION RESPIRATORY (INHALATION) at 05:44

## 2020-03-11 RX ADMIN — FLUTICASONE PROPIONATE 1 SPRAY: 50 SPRAY, METERED NASAL at 09:01

## 2020-03-11 RX ADMIN — DILTIAZEM HYDROCHLORIDE 120 MG: 120 CAPSULE, COATED, EXTENDED RELEASE ORAL at 09:00

## 2020-03-11 RX ADMIN — Medication 10 ML: at 09:01

## 2020-03-11 RX ADMIN — DOXYCYCLINE HYCLATE 100 MG: 100 TABLET, COATED ORAL at 09:01

## 2020-03-11 ASSESSMENT — PAIN SCALES - GENERAL
PAINLEVEL_OUTOF10: 0
PAINLEVEL_OUTOF10: 0

## 2020-03-11 NOTE — H&P
History & Physical    Patient:  Mark Alonso  YOB: 1950  Date of Service: 3/11/2020  MRN: [id number]   Acct:   [de-identified]   Primary Care Physician: Eli Alaniz DO    Chief Complaint:   Chief Complaint   Patient presents with    Shortness of Breath     shortness of breath x 2 days. Pt states HR is 154 bpm. Denies any pain    Tachycardia       History of Present Illness: The patient is a 68 y.o. male presented to the emergency room complaining of shortness of breath and increased heart rate. The patient has a history of advanced COPD and chronic respiratory failure, continues 2.5 L oxygen, also history of paroxysmal atrial fibrillation in the past for which he refused anticoagulation therapy. The patient came to our emergency room on 3/4/2020 complaining of increased from baseline cough, shortness of breath, wheezing. He was eventually discharged home with diagnosis of COPD exacerbation and prescribed doxycycline and prednisone. He was also prescribed Tamiflu since he was exposed to a girlfriend with known influenza diagnosis. The patient states that he was improving, his cough subsided and shortness of breath also improved, however yesterday it became worse from baseline. It became more constant, moderate to severe in intensity, worse with any activities. States that his heart rate was 154 when he checked. He did not have any associated chest pain, dizziness, palpitations, nausea or vomiting. Work-up in the emergency room revealed elevated heart rate 158. Blood pressure was stable 122/84, he was saturating 97% on room air, was afebrile. BMP was unremarkable, troponin was less than 0.03, proBNP 4, 855, CBC essentially normal except mildly decreased H&H 12.7/39.4. Portable chest x-ray revealed no acute cardiopulmonary disease. EKG revealed atrial flutter 2-1 conduction with rate of 150. The patient was treated with IV Cardizem in the emergency room and started on Cardizem drip. Shortly after he converted to normal sinus rhythm with heart rate dropping to 80s. He reported feeling better. He was deemed appropriate for admission for observation to make sure his heart rate remained stable. The patient is supposed to be on Cardizem 30 mg twice daily at home however he states that he has not been taking it for several months out of concerns for potential side effects. He does have a history of paroxysmal atrial fibrillation in the past and was suggested to be anticoagulated for stroke prevention however he chose not to since he was concerned about potential bleeding. This morning the patient feels a lot better. He is heart rate is staying in 46s. Past Medical History:        Diagnosis Date    Abdominal aneurysm ([address]     x2    Aortic aneurysm (HCC)     Atrial fibrillation (HCC)     Chicken pox     Chronic back pain     COPD (chronic obstructive pulmonary disease) (HCC)     Emphysema of lung (HCC)     Emphysema of lung (HCC)     Hyperlipidemia     Hypertension 2015    Osteoarthritis        Past Surgical History:        Procedure Laterality Date    CARDIAC CATHETERIZATION Left 08/05/15    Dr. Thuan Cole 30% disease of the left main trunck, Mild plaque disease in the coronary arteries. Normal LV function, ejection fraction of 60%    EYE SURGERY         Home Medications:   No current facility-administered medications on file prior to encounter.       Current Outpatient Medications on File Prior to Encounter   Medication Sig Dispense Refill    omeprazole (PRILOSEC) 20 MG delayed release capsule Take 20 mg by mouth 2 times daily      doxycycline monohydrate (MONODOX) 100 MG capsule Take 1 capsule by mouth 2 times daily 20 capsule 0    Cholecalciferol (VITAMIN D3) 2000 units CAPS Take 2,000 Units by mouth daily      albuterol (PROVENTIL) (2.5 MG/3ML) 0.083% nebulizer solution Take 2.5 mg by nebulization every 6 hours as needed for Wheezing      fluticasone Troponin levels normal.  The patient was recommended to consider anticoagulation for stroke prophylaxis based on his chads score however he declined. He would like to follow-up with his PCP at [place] to discuss this further. I also recommended to resume his previous Cardizem dose of 30 mg twice daily. He will be discharged home since he is stable  2. Acute exacerbation of COPD -resolving. The patient will be prescribed prednisone course, continue doxycycline  3. Hypertension -blood pressure is stable  4. Hyperlipidemia  5. Chronic respiratory failure due to advanced COPD, on 3.5 L continuous oxygen    CODE STATUS full      Medical Necessity: Inpatient admission is appropriate for this patient secondary to the need of      Estimated length of stay: days. The beneficiary may reasonably be expected to be discharged or transferred to a hospital within 96 hours after admission.     DVT prophylaxis:   [x] Lovenox   [] SCDs   [] SQ Heparin   [] Encourage ambulation, low risk for DVT, no chemical or mechanical    prophylaxis necessary      [] Already on Anticoagulation    Anticipated Disposition upon discharge:   [x] Home   [] Home with Home Health   [] [facility]   [] [address]      Electronically signed by Cindy Stringer MD on 3/11/2020 at 9:19 AM

## 2020-03-11 NOTE — PROGRESS NOTES
Discharge instructions given to patient and his wife. Special attention given to medications, follow up appointments, diet, activity, and reasons to call the doctor or return to the hospital. Both verbalize understanding. IV discontinued with catheter intact. Pt up to bathroom to get dressed, will let RN know when he is ready to be discharged.

## 2020-03-11 NOTE — PROGRESS NOTES
Nutrition Assessment    Type and Reason for Visit: Initial(observation)    Nutrition Recommendations: continue current diet    Nutrition Assessment: Altered nutrition related lab r/t endocrine dysfunction with steroid therapy aeb glucose 159. Pt with no concerns. Eats 4 times daily, does not follow low sodium or low fat diet. He declined diet education on them. #. Malnutrition Assessment:  · Malnutrition Status: No malnutrition  · Context: Acute illness or injury  · Findings of the 6 clinical characteristics of malnutrition (Minimum of 2 out of 6 clinical characteristics is required to make the diagnosis of moderate or severe Protein Calorie Malnutrition based on AND/ASPEN Guidelines):  1. Energy Intake-Greater than 75% of estimated energy requirement,      2. Weight Loss-No significant weight loss,    3. Fat Loss-No significant subcutaneous fat loss,    4. Muscle Loss-No significant muscle mass loss,    5. Fluid Accumulation-Unable to assess,    6.   Strength-Not measured    Nutrition Risk Level: Low, Moderate    Nutrient Needs:  · Estimated Daily Total Kcal:    · Estimated Daily Protein (g):    · Estimated Daily Total Fluid (ml/day):      Nutrition Diagnosis:   · Problem: Altered nutrition-related lab values  · Etiology: related to Endocrine dysfunction     Signs and symptoms:  as evidenced by Lab values(glucose 159-steroid therapy)    Objective Information:  · Nutrition-Focused Physical Findings: appears well nourished  · Wound Type: None  · Current Nutrition Therapies:  · Oral Diet Orders: Cardiac   · Oral Diet intake: Unable to assess  · Oral Nutrition Supplement (ONS) Orders: None  · Anthropometric Measures:  · Ht: 5' 10\" (177.8 cm)   · Current Body Wt: 167 lb 6.4 oz (75.9 kg)  · Admission Body Wt: 170 lb (77.1 kg)  · Usual Body Wt: 165 lb (74.8 kg)(160-168#)  · % Weight Change:  ,  stable  · Ideal Body Wt: 166 lb (75.3 kg), % Ideal Body 101%  · BMI Classification: BMI 18.5 - 24.9 Normal Weight  Recent Labs     03/10/20  1810 03/11/20  0318    141   K 4.8 4.9   CL 93* 99   CO2 34* 34*   BUN 19 16   CREATININE 0.91 0.81   GLUCOSE 131* 159*   GFR       NOT REPORTED       NOT REPORTED      Lab Results   Component Value Date    SANDRABU 4.4 12/08/2017      Nutrition Interventions:   Continue current diet  Continued Inpatient Monitoring, Coordination of Care, Education declined    Nutrition Evaluation:   · Evaluation: Goals set   · Goals: PO > 75% of meals    · Monitoring: Meal Intake, I&O, Weight, Pertinent Labs, Patient/Family Education      Electronically signed by Paul De Leon, RD, LD on 3/11/20 at 8:49 AM EDT    Contact Number: [phone]

## 2020-03-11 NOTE — PROGRESS NOTES
SW met with pt and spouse to complete assessment during quality rounds. Pt is alert and oriented and pleasant and allows wife to do most of the talking. Pt is a 68year old  male admitted for a fib. Pt lives with his spouse. Pt has home health care services through the [place] which spouse is very unhappy with and discusses at length. Suggestions provided regarding who she should speak with to assist. Pt receives his home oxygen through the [place] and sees the physician at the [facility] [place] clinic in [place]. Pt has a ramp, walker, shower chair, and wheelchair. Spouse reports they just remodeled their shower when he became ill. Pt is also in the process of getting a power chair or scooter for mobility. Pt uses the [address] to get to appointments at the [facility] [place] in [address] [place] [address] Dr and spouse transports locally. Pt is a full code and follows with Dr Karan Dubose at the [facility] as PCP. Pt reports that he gets his meds through the [facility] [place] and they are affordable. Pt does not currently have advance directives but spouse reports that they have the paperwork and they plan to complete these soon. Spouse expresses understanding that she may call with any questions regarding these. Pt plans to return home this morning with spouse driving him home. DAHLIA provided [employer] skilled listing as well as private duty care list to spouse as requested. No further needs identified by pt or spouse. SW will remain available as needed.  Dionna Roque 3/11/2020

## 2020-03-11 NOTE — PROGRESS NOTES
Pt awake in bed, wife at bedside. Vital signs and assessment completed. Lung sounds with scattered rhonchi and expiratory wheezes throughout, pt states he has felt more \"raspy\" with his breathing recently. Pts wife states they were going to get home health care through the [place] but haven't heard anything from them in over a week. Will consult Palliative care and . Pt and wife deny any other needs at this time. Call light in reach.

## 2020-03-11 NOTE — DISCHARGE SUMMARY
Hospitalist Discharge Summary    Patient:  Tierney Sanchez  YOB: 1950    MRN: [id number]   Acct: [de-identified]    Primary Care Physician: Taiwo De Leon DO    Admit date:  3/10/2020    Discharge date:  3/11/2020       Discharge Diagnoses:     1. Paroxysmal atrial fibrillation with RVR, resolved  2. Acute exacerbation of COPD  3. Hypertension  4. Hyperlipidemia  5. Chronic respiratory failure due to COPD, oxygen dependent  6. Aortic aneurysm      Discharge Medications:       Amber Earl   Home Medication Instructions JOSE:[id number]    Printed on:03/11/20 1002   Medication Information                      albuterol (PROVENTIL) (2.5 MG/3ML) 0.083% nebulizer solution  Take 2.5 mg by nebulization every 6 hours as needed for Wheezing             albuterol (PROVENTIL;VENTOLIN) 90 MCG/ACT inhaler  Inhale 2 puffs into the lungs every 6 hours as needed. aspirin 81 MG chewable tablet  Take 1 tablet by mouth daily             budesonide-formoterol (SYMBICORT) 160-4.5 MCG/ACT AERO  Inhale 2 puffs into the lungs 2 times daily.              carvedilol (COREG) 25 MG tablet  Take 12.5 mg by mouth 2 times daily (with meals)              Cholecalciferol (VITAMIN D3) 2000 units CAPS  Take 2,000 Units by mouth daily             diltiazem (CARDIZEM) 30 MG tablet  Take 1 tablet by mouth 2 times daily             doxycycline monohydrate (MONODOX) 100 MG capsule  Take 1 capsule by mouth 2 times daily             fluticasone (FLONASE) 50 MCG/ACT nasal spray  1 spray by Nasal route daily             omeprazole (PRILOSEC) 20 MG delayed release capsule  Take 20 mg by mouth 2 times daily             OXYGEN  Inhale 3.5 L into the lungs              predniSONE (DELTASONE) 20 MG tablet  Take 1 tablet by mouth 2 times daily for 5 days             rosuvastatin (CRESTOR) 20 MG tablet  Take 20 mg by mouth daily             tiotropium (SPIRIVA HANDIHALER) 18 MCG inhalation capsule  Inhale 18 mcg into the lungs daily.                 Diet:  DIET CARDIAC;     Activity: Resume prehospital activities    Follow-up:  in 1 weeks with Elizabeth Pal DO,        Physical Exam:    Vitals:  Patient Vitals for the past 24 hrs:   BP Temp Temp src Pulse Resp SpO2 Height Weight   03/11/20 0950 -- -- -- -- -- 94 % -- --   03/11/20 0738 136/75 97.4 °F (36.3 °C) Oral 58 20 96 % -- --   03/11/20 0544 -- -- -- -- -- 96 % -- --   03/11/20 0412 132/69 97.9 °F (36.6 °C) Oral 56 20 97 % -- --   03/10/20 2345 (!) 105/56 97.7 °F (36.5 °C) Oral 55 20 98 % -- --   03/10/20 2130 -- -- -- -- -- 97 % -- --   03/10/20 2127 -- -- -- -- -- 97 % -- --   03/10/20 2052 130/62 97.8 °F (36.6 °C) Oral 62 20 98 % 5' 10\" (1.778 m) 167 lb 6.4 oz (75.9 kg)   03/10/20 2022 (!) 102/37 -- -- 57 23 98 % -- --   03/10/20 2005 119/68 -- -- 62 23 100 % -- --   03/10/20 1951 138/74 -- -- 141 21 99 % -- --   03/10/20 1936 124/62 -- -- 126 22 100 % -- --   03/10/20 1920 124/63 -- -- 131 19 99 % -- --   03/10/20 1907 125/62 -- -- 132 20 99 % -- --   03/10/20 1851 120/71 -- -- 120 24 97 % -- --   03/10/20 1835 107/74 -- -- 161 24 100 % -- --   03/10/20 1820 110/72 -- -- 157 23 100 % -- --   03/10/20 1805 107/77 97.7 °F (36.5 °C) Oral 158 23 99 % 5' 10\" (1.778 m) 170 lb (77.1 kg)   03/10/20 1801 122/84 -- -- -- -- 99 % -- --     General Appearance: alert and oriented to person, place and time, in no acute distress  Cardiovascular: normal rate, regular rhythm, normal S1 and S2, no murmurs, rubs, clicks, or gallops, distal pulses intact  Pulmonary/Chest: Diffuse bilateral expiratory wheezes, no  rales or rhonchi, diminished breath sounds bilaterally, no respiratory distress  Abdomen: soft, non-tender, non-distended, normal bowel sounds, no masses Extremities: no cyanosis, clubbing or edema   Skin: warm and dry, no rash or erythema  Head: normocephalic and atraumatic  Eyes: pupils equal, round, and reactive to light  Neck: supple and non-tender without mass, no thyromegaly

## 2020-08-19 ENCOUNTER — TELEPHONE (OUTPATIENT)
Dept: CARDIAC REHAB | Age: 70
End: 2020-08-19

## 2020-11-07 ENCOUNTER — HOSPITAL ENCOUNTER (EMERGENCY)
Age: 70
Discharge: HOME | End: 2020-11-07
Attending: FAMILY MEDICINE
Payer: MEDICARE

## 2020-11-07 VITALS
HEIGHT: 70 IN | HEART RATE: 57 BPM | WEIGHT: 166 LBS | BODY MASS INDEX: 23.77 KG/M2 | DIASTOLIC BLOOD PRESSURE: 63 MMHG | SYSTOLIC BLOOD PRESSURE: 154 MMHG | TEMPERATURE: 97.7 F | OXYGEN SATURATION: 98 % | RESPIRATION RATE: 18 BRPM

## 2020-11-07 PROCEDURE — 99283 EMERGENCY DEPT VISIT LOW MDM: CPT

## 2020-11-07 RX ORDER — DOXYCYCLINE HYCLATE 100 MG/1
100 CAPSULE ORAL 2 TIMES DAILY
Qty: 20 CAPSULE | Refills: 0 | Status: SHIPPED | OUTPATIENT
Start: 2020-11-07 | End: 2020-11-17

## 2020-11-07 RX ORDER — PREDNISONE 20 MG/1
20 TABLET ORAL 2 TIMES DAILY
Qty: 10 TABLET | Refills: 0 | Status: SHIPPED | OUTPATIENT
Start: 2020-11-07 | End: 2020-11-12

## 2020-11-08 NOTE — ED PROVIDER NOTES
eMERGENCY dEPARTMENT eNCOUnter        [facility]    Chief Complaint   Patient presents with    Nasal Congestion     Patient arrives to ER today with complaints of nasal congestion, sinus pressure, and headaches for the last 1-2 weeks. Patient has end stage emphysema and COPD and is on 3L of oxygen. [facility]    Tamia Singletary is a 68 y.o. male who presents with a productive cough and nasal congestion. Patient has a history of COPD. He feels he is having a flareup of his COPD. He generally responds well to prednisone and antibiotics for this. REVIEW OF SYSTEMS    All systems reviewed and positives are in the HPI. PAST MEDICAL HISTORY    Past Medical History:   Diagnosis Date    Abdominal aneurysm ([facility]     x2    Aortic aneurysm (HCC)     Atrial fibrillation (HCC)     Chicken pox     Chronic back pain     [facility]     Emphysema of lung (HCC)     Emphysema of lung ([facility]     Hyperlipidemia     Hypertension 2015    Osteoarthritis        SURGICAL HISTORY    Past Surgical History:   Procedure Laterality Date    CARDIAC CATHETERIZATION Left 08/05/15    Dr. Shyam Stroud 30% disease of the left main trunck, Mild plaque disease in the coronary arteries.   Normal LV function, ejection fraction of 60%    EYE SURGERY         CURRENT MEDICATIONS    Current Outpatient Rx   Medication Sig Dispense Refill    doxycycline hyclate (VIBRAMYCIN) 100 MG capsule Take 1 capsule by mouth 2 times daily for 10 days 20 capsule 0    predniSONE (DELTASONE) 20 MG tablet Take 1 tablet by mouth 2 times daily for 5 days 10 tablet 0    omeprazole (PRILOSEC) 20 MG delayed release capsule Take 20 mg by mouth 2 times daily      diltiazem (CARDIZEM) 30 MG tablet Take 1 tablet by mouth 2 times daily 60 tablet 0    Cholecalciferol (VITAMIN D3) 2000 units CAPS Take 2,000 Units by mouth daily      albuterol (PROVENTIL) (2.5 MG/3ML) 0.083% nebulizer solution Take 2.5 mg by nebulization every 6 hours as needed for Wheezing      fluticasone (FLONASE) 50 MCG/ACT nasal spray 1 spray by Nasal route daily 1 Bottle 3    rosuvastatin (CRESTOR) 20 MG tablet Take 20 mg by mouth daily      OXYGEN Inhale 3.5 L into the lungs       carvedilol (COREG) 25 MG tablet Take 12.5 mg by mouth 2 times daily (with meals)       aspirin 81 MG chewable tablet Take 1 tablet by mouth daily 30 tablet 3    budesonide-formoterol (SYMBICORT) 160-4.5 MCG/ACT AERO Inhale 2 puffs into the lungs 2 times daily.  tiotropium (SPIRIVA HANDIHALER) 18 MCG inhalation capsule Inhale 18 mcg into the lungs daily.  albuterol (PROVENTIL;VENTOLIN) 90 MCG/ACT inhaler Inhale 2 puffs into the lungs every 6 hours as needed.          ALLERGIES    Allergies   Allergen Reactions    Codeine Anaphylaxis       FAMILY HISTORY    Family History   Problem Relation Age of Onset   [de-identified] Cancer Mother         Pancreatic cancer    Cancer Father         Lung    Cancer Brother         Brain cancer, pancreatic cancer, colon cancer       SOCIAL HISTORY    Social History     Socioeconomic History    Marital status:      Spouse name: None    Number of children: None    Years of education: None    Highest education level: None   Occupational History    None   Social Needs    Financial resource strain: None    Food insecurity     Worry: None     Inability: None    Transportation needs     Medical: None     Non-medical: None   Tobacco Use    Smoking status: Current Some Day Smoker     Packs/day: 0.25     Years: 39.00     Pack years: 9.75     Types: Cigarettes     Last attempt to quit: 2/20/2017     Years since quitting: 3.7    Smokeless tobacco: Never Used    Tobacco comment: a couple times a week   Substance and Sexual Activity    Alcohol use: No     Alcohol/week: 0.0 standard drinks     Comment: very little    Drug use: No    Sexual activity: None   Lifestyle    Physical activity     Days per week: None     Minutes per session: None    Stress: None   Relationships    Social connections     Talks on phone: None     Gets together: None     Attends Confucianist service: None     Active member of club or organization: None     Attends meetings of clubs or organizations: None     Relationship status: None    Intimate partner violence     Fear of current or ex partner: None     Emotionally abused: None     Physically abused: None     Forced sexual activity: None   Other Topics Concern    None   Social History Narrative    None       PHYSICAL EXAM    VITAL SIGNS: BP (!) 154/63   Pulse 57   Temp 97.7 °F (36.5 °C) (Temporal)   Resp 18   Ht 5' 10\" (1.778 m)   Wt 166 lb (75.3 kg)   SpO2 98%   BMI 23.82 kg/m²    Constitutional:  Well developed, well nourished, no acute distress   HENT:  Atraumatic, external ears normal, nose normal, oropharynx moist. Neck- supple   Respiratory: Lungs clear  Cardiovascular: Regular rate and rhythm without murmurs or gallops  GI:  Soft, nondistended, normal bowel sounds, nontender, no organomegaly, no mass, no rebound, no guarding   Musculoskeletal:  No edema, no tenderness, no deformities. Back- no tenderness   Integument:  Well hydrated, no rash   Neurologic:  Alert & oriented x 3, no focal deficits noted     EKG        RADIOLOGY/PROCEDURES        ED COURSE & MEDICAL DECISION MAKING    Pertinent Labs & Imaging studies reviewed. (See chart for details)  Treat with prednisone and doxycycline. Patient was stable on discharge. FINAL IMPRESSION    1.  COPD with acute exacerbation  2. Summation      Patient Course: Patient was stable on discharge.     ED Medications administered this visit:  Medications - No data to display    New Prescriptions from this visit:    Discharge Medication List as of 11/7/2020  1:16 PM      START taking these medications    Details   doxycycline hyclate (VIBRAMYCIN) 100 MG capsule Take 1 capsule by mouth 2 times daily for 10 days, Disp-20 capsule,R-0Normal predniSONE (DELTASONE) 20 MG tablet Take 1 tablet by mouth 2 times daily for 5 days, Disp-10 tablet,R-0Normal             Follow-up:  No follow-up provider specified. Final Impression:   1.  COPD with acute exacerbation ([facility] [address]               (Please note that portions of this note were completed with a voice recognition program.  Efforts were made to edit the dictations but occasionally words are mis-transcribed.)     Kevin Nunez MD  11/08/20 4330

## 2020-12-28 ENCOUNTER — HOSPITAL ENCOUNTER (EMERGENCY)
Age: 70
Discharge: HOME | End: 2020-12-28
Attending: FAMILY MEDICINE
Payer: OTHER GOVERNMENT

## 2020-12-28 ENCOUNTER — APPOINTMENT (OUTPATIENT)
Dept: GENERAL RADIOLOGY | Age: 70
End: 2020-12-28
Payer: OTHER GOVERNMENT

## 2020-12-28 VITALS
DIASTOLIC BLOOD PRESSURE: 84 MMHG | OXYGEN SATURATION: 100 % | SYSTOLIC BLOOD PRESSURE: 158 MMHG | HEART RATE: 56 BPM | RESPIRATION RATE: 15 BRPM | HEIGHT: 70 IN | WEIGHT: 165 LBS | TEMPERATURE: 98.4 F | BODY MASS INDEX: 23.62 KG/M2

## 2020-12-28 LAB
ABSOLUTE EOS #: 0 K/UL (ref 0–0.4)
ABSOLUTE IMMATURE GRANULOCYTE: ABNORMAL K/UL (ref 0–0.3)
ABSOLUTE LYMPH #: 0.9 K/UL (ref 1–4.8)
ABSOLUTE MONO #: 0.5 K/UL (ref 0–1)
ANION GAP SERPL CALCULATED.3IONS-SCNC: 8 MMOL/L (ref 9–17)
BASOPHILS # BLD: 0 % (ref 0–2)
BASOPHILS ABSOLUTE: 0 K/UL (ref 0–0.2)
BNP INTERPRETATION: NORMAL
BUN BLDV-MCNC: 21 MG/DL (ref 8–23)
BUN/CREAT BLD: 25 (ref 9–20)
CALCIUM SERPL-MCNC: 9.2 MG/DL (ref 8.6–10.4)
CHLORIDE BLD-SCNC: 101 MMOL/L (ref 98–107)
CO2: 33 MMOL/L (ref 20–31)
CREAT SERPL-MCNC: 0.83 MG/DL (ref 0.7–1.2)
DIFFERENTIAL TYPE: YES
EKG ATRIAL RATE: 61 BPM
EKG P AXIS: 89 DEGREES
EKG P-R INTERVAL: 146 MS
EKG Q-T INTERVAL: 410 MS
EKG QRS DURATION: 76 MS
EKG QTC CALCULATION (BAZETT): 412 MS
EKG R AXIS: -72 DEGREES
EKG T AXIS: 84 DEGREES
EKG VENTRICULAR RATE: 61 BPM
EOSINOPHILS RELATIVE PERCENT: 0 % (ref 0–5)
GFR AFRICAN AMERICAN: >60 ML/MIN
GFR NON-AFRICAN AMERICAN: >60 ML/MIN
GFR SERPL CREATININE-BSD FRML MDRD: ABNORMAL ML/MIN/{1.73_M2}
GFR SERPL CREATININE-BSD FRML MDRD: ABNORMAL ML/MIN/{1.73_M2}
GLUCOSE BLD-MCNC: 124 MG/DL (ref 70–99)
HCT VFR BLD CALC: 39.8 % (ref 41–53)
HEMOGLOBIN: 13.1 G/DL (ref 13.5–17.5)
IMMATURE GRANULOCYTES: ABNORMAL %
LYMPHOCYTES # BLD: 9 % (ref 13–44)
MCH RBC QN AUTO: 28.5 PG (ref 26–34)
MCHC RBC AUTO-ENTMCNC: 32.9 G/DL (ref 31–37)
MCV RBC AUTO: 86.7 FL (ref 80–100)
MONOCYTES # BLD: 5 % (ref 5–9)
NRBC AUTOMATED: ABNORMAL PER 100 WBC
PDW BLD-RTO: 14.9 % (ref 12.1–15.2)
PLATELET # BLD: 175 K/UL (ref 140–450)
PLATELET ESTIMATE: ABNORMAL
PMV BLD AUTO: ABNORMAL FL (ref 6–12)
POTASSIUM SERPL-SCNC: 4.2 MMOL/L (ref 3.7–5.3)
PRO-BNP: 287 PG/ML
RBC # BLD: 4.59 M/UL (ref 4.5–5.9)
RBC # BLD: ABNORMAL 10*6/UL
SEG NEUTROPHILS: 86 % (ref 39–75)
SEGMENTED NEUTROPHILS ABSOLUTE COUNT: 9 K/UL (ref 2.1–6.5)
SODIUM BLD-SCNC: 142 MMOL/L (ref 135–144)
TROPONIN INTERP: NORMAL
TROPONIN T: <0.03 NG/ML
TROPONIN, HIGH SENSITIVITY: NORMAL NG/L (ref 0–22)
WBC # BLD: 10.4 K/UL (ref 3.5–11)
WBC # BLD: ABNORMAL 10*3/UL

## 2020-12-28 PROCEDURE — 93005 ELECTROCARDIOGRAM TRACING: CPT | Performed by: FAMILY MEDICINE

## 2020-12-28 PROCEDURE — 84484 ASSAY OF TROPONIN QUANT: CPT

## 2020-12-28 PROCEDURE — 71045 X-RAY EXAM CHEST 1 VIEW: CPT

## 2020-12-28 PROCEDURE — 99284 EMERGENCY DEPT VISIT MOD MDM: CPT

## 2020-12-28 PROCEDURE — 6370000000 HC RX 637 (ALT 250 FOR IP): Performed by: FAMILY MEDICINE

## 2020-12-28 PROCEDURE — 6360000002 HC RX W HCPCS: Performed by: FAMILY MEDICINE

## 2020-12-28 PROCEDURE — C9803 HOPD COVID-19 SPEC COLLECT: HCPCS

## 2020-12-28 PROCEDURE — 96374 THER/PROPH/DIAG INJ IV PUSH: CPT

## 2020-12-28 PROCEDURE — 93010 ELECTROCARDIOGRAM REPORT: CPT | Performed by: INTERNAL MEDICINE

## 2020-12-28 PROCEDURE — 83880 ASSAY OF NATRIURETIC PEPTIDE: CPT

## 2020-12-28 PROCEDURE — 85025 COMPLETE CBC W/AUTO DIFF WBC: CPT

## 2020-12-28 PROCEDURE — U0003 INFECTIOUS AGENT DETECTION BY NUCLEIC ACID (DNA OR RNA); SEVERE ACUTE RESPIRATORY SYNDROME CORONAVIRUS 2 (SARS-COV-2) (CORONAVIRUS DISEASE [COVID-19]), AMPLIFIED PROBE TECHNIQUE, MAKING USE OF HIGH THROUGHPUT TECHNOLOGIES AS DESCRIBED BY CMS-2020-01-R: HCPCS

## 2020-12-28 PROCEDURE — 94664 DEMO&/EVAL PT USE INHALER: CPT

## 2020-12-28 PROCEDURE — 80048 BASIC METABOLIC PNL TOTAL CA: CPT

## 2020-12-28 RX ORDER — AZITHROMYCIN 250 MG/1
250 TABLET, FILM COATED ORAL SEE ADMIN INSTRUCTIONS
Qty: 6 TABLET | Refills: 0 | Status: SHIPPED | OUTPATIENT
Start: 2020-12-28 | End: 2021-01-02

## 2020-12-28 RX ORDER — PREDNISONE 20 MG/1
60 TABLET ORAL DAILY
Qty: 15 TABLET | Refills: 0 | Status: SHIPPED | OUTPATIENT
Start: 2020-12-28 | End: 2021-01-02

## 2020-12-28 RX ORDER — IPRATROPIUM BROMIDE AND ALBUTEROL SULFATE 2.5; .5 MG/3ML; MG/3ML
1 SOLUTION RESPIRATORY (INHALATION) ONCE
Status: COMPLETED | OUTPATIENT
Start: 2020-12-28 | End: 2020-12-28

## 2020-12-28 RX ORDER — METHYLPREDNISOLONE SODIUM SUCCINATE 125 MG/2ML
125 INJECTION, POWDER, LYOPHILIZED, FOR SOLUTION INTRAMUSCULAR; INTRAVENOUS ONCE
Status: COMPLETED | OUTPATIENT
Start: 2020-12-28 | End: 2020-12-28

## 2020-12-28 RX ADMIN — METHYLPREDNISOLONE SODIUM SUCCINATE 125 MG: 125 INJECTION, POWDER, FOR SOLUTION INTRAMUSCULAR; INTRAVENOUS at 12:51

## 2020-12-28 RX ADMIN — IPRATROPIUM BROMIDE AND ALBUTEROL SULFATE 1 AMPULE: .5; 3 SOLUTION RESPIRATORY (INHALATION) at 12:56

## 2020-12-28 ASSESSMENT — ENCOUNTER SYMPTOMS
SHORTNESS OF BREATH: 1
COUGH: 1

## 2020-12-29 ENCOUNTER — CARE COORDINATION (OUTPATIENT)
Dept: CARE COORDINATION | Age: 70
End: 2020-12-29

## 2020-12-29 NOTE — CARE COORDINATION
Patient to [facility] ED 2020  complaining of cough and shortness of breath for last few days. COVID test Pending. FINAL IMPRESSION       1. COPD exacerbation     Outreach call to follow up with patient for follow up ED visit/COVID monitoring, no answer, left detailed vm to return call to this nurse at [phone]. Return call from patient's wife, states that patient is feeling about the same. He is using his inhaler and nebulizer. He has been taking OTC cold and cough meds. She states she woke up this morning with head and chest congestion and cough too. She has called PCP office. Instructed to return to the ED if difficulty breathing or chest pain. They do not have capabilities for Loop program.   ACM will follow up with patient in 5-7 days. Patient contacted regarding Ana Juli. Discussed COVID-19 related testing which was pending at this time. Test results were pending. Patient informed of results, if available?     Care Transition Nurse/ Ambulatory Care Manager contacted the patient wife by telephone to perform post discharge assessment. Call within 2 business days of discharge: Yes. Verified name and  with family as identifiers. Provided introduction to self, and explanation of the CTN/ACM role, and reason for call due to risk factors for infection and/or exposure to COVID-19. Symptoms reviewed with family who verbalized the following symptoms: no worsening symptoms. Due to no new or worsening symptoms encounter was not routed to provider for escalation. Discussed follow-up appointments. If no appointment was previously scheduled, appointment scheduling offered: Yes  [address] follow up appointment(s): No future appointments.   Non-[facility] follow up appointment(s):     Non-face-to-face services provided:  Scheduled appointment with PCP-wife states she has called PCP  Obtained and reviewed discharge summary and/or continuity of care documents  Reviewed and followed up on pending diagnostic tests and treatments-done  Education of patient/family/caregiver/guardian to support self-management-done  Assessment and support for treatment adherence and medication management-done     Advance Care Planning:   Does patient have an Advance Directive:  decision maker updated. Patient has following risk factors of: COPD. CTN/ACM reviewed discharge instructions, medical action plan and red flags such as increased shortness of breath, increasing fever and signs of decompensation with family who verbalized understanding. Discussed exposure protocols and quarantine with CDC Guidelines What to do if you are sick with coronavirus disease 2019.  Family was given an opportunity for questions and concerns. The family agrees to contact the [facility] exposure line [phone], [facility] [address]: ([phone]) and PCP office for questions related to their healthcare. CTN/ACM provided contact information for future needs. Reviewed and educated family on any new and changed medications related to discharge diagnosis     Patient/family/caregiver given information for [facility] Loop and agrees to enroll no  Patient's preferred e-mail:    Patient's preferred phone number:   Based on Loop alert triggers, patient will be contacted by nurse care manager for worsening symptoms. Plan for follow-up call in 5-7 days based on severity of symptoms and risk factors.

## 2020-12-29 NOTE — ED PROVIDER NOTES
[facility]  eMERGENCY dEPARTMENT eNCOUnter          [address] [facility]       Chief Complaint   Patient presents with    Shortness of Breath     sob for the last couple days       Nurses Notes reviewed and I agree except as noted in the HPI. HISTORY OF PRESENT ILLNESS    Dalia Abrams is a 65 y.o. male who presents to the emergency room via private vehicle, patient complaining of cough and shortness of breath for last few days, denies any fever. Patient states does have a home nebulizer and has been helping slightly, has been wearing his home O2 at 3.5 L/min daily. Patient does admit he occasionally still smokes. Denies any known sick contacts. Denies pain. REVIEW OF SYSTEMS     Review of Systems   Constitutional: Negative for fever. Respiratory: Positive for cough and shortness of breath. All other systems reviewed and are negative. PAST MEDICAL HISTORY    has a past medical history of Abdominal aneurysm ([address] Aortic aneurysm ([address] Atrial fibrillation ([address] Chicken pox, Chronic back pain, COPD (chronic obstructive pulmonary disease) ([address] Emphysema of lung ([address] Emphysema of lung ([address] Hyperlipidemia, Hypertension, and Osteoarthritis. SURGICAL HISTORY      has a past surgical history that includes eye surgery and Cardiac catheterization (Left, 08/05/15).     CURRENT MEDICATIONS       Discharge Medication List as of 12/28/2020  2:53 PM      CONTINUE these medications which have NOT CHANGED    Details   omeprazole (PRILOSEC) 20 MG delayed release capsule Take 20 mg by mouth 2 times dailyHistorical Med      Cholecalciferol (VITAMIN D3) 2000 units CAPS Take 2,000 Units by mouth dailyHistorical Med      albuterol (PROVENTIL) (2.5 MG/3ML) 0.083% nebulizer solution Take 2.5 mg by nebulization every 6 hours as needed for WheezingHistorical Med      fluticasone (FLONASE) 50 MCG/ACT nasal spray 1 spray by Nasal route daily, Disp-1 Bottle, R-3Normal      rosuvastatin (CRESTOR) 20 MG tablet Take 20 mg by mouth dailyHistorical Med      OXYGEN Inhale 3.5 L into the lungs Historical Med      carvedilol (COREG) 25 MG tablet Take 12.5 mg by mouth 2 times daily (with meals)       aspirin 81 MG chewable tablet Take 1 tablet by mouth daily, Disp-30 tablet, R-3      budesonide-formoterol (SYMBICORT) 160-4.5 MCG/ACT AERO Inhale 2 puffs into the lungs 2 times daily. albuterol (PROVENTIL;VENTOLIN) 90 MCG/ACT inhaler Inhale 2 puffs into the lungs every 6 hours as needed. tiotropium (SPIRIVA HANDIHALER) 18 MCG inhalation capsule Inhale 18 mcg into the lungs daily. diltiazem (CARDIZEM) 30 MG tablet Take 1 tablet by mouth 2 times daily, Disp-60 tablet,R-0Print             ALLERGIES     is allergic to codeine. FAMILY HISTORY     He indicated that his mother is . He indicated that his father is . He indicated that his sister is alive. He indicated that his brother is . family history includes Cancer in his brother, father, and mother. SOCIAL HISTORY      reports that he has been smoking cigarettes. He has a 9.75 pack-year smoking history. He has never used smokeless tobacco. He reports that he does not drink alcohol or use drugs. PHYSICAL EXAM     INITIAL VITALS:  height is 5' 10\" (1.778 m) and weight is 165 lb (74.8 kg). His oral temperature is 98.4 °F (36.9 °C). His blood pressure is 158/84 (abnormal) and his pulse is 56. His respiration is 15 and oxygen saturation is 100%. Physical Exam   Constitutional: Patient is oriented to person, place, and time. Patient appears well-developed and well-nourished. Patient is active and cooperative. HENT:   Head: Normocephalic and atraumatic. Head is without contusion. Right Ear: external ear normal. No drainage. Left Ear: external ear normal. No drainage. Nose: Nose normal. No nasal deformity. No epistaxis. Mouth/Throat: Mucous membranes are not dry. Eyes: EOMI.  Conjunctivae, sclera, and lids WITH AUTO DIFFERENTIAL - Abnormal; Notable for the following components:       Result Value    Hemoglobin 13.1 (*)     Hematocrit 39.8 (*)     Seg Neutrophils 86 (*)     Lymphocytes 9 (*)     Segs Absolute 9.00 (*)     Absolute Lymph # 0.90 (*)     All other components within normal limits   BASIC METABOLIC PANEL W/ REFLEX TO MG FOR LOW K - Abnormal; Notable for the following components:    Glucose 124 (*)     Bun/Cre Ratio 25 (*)     CO2 33 (*)     Anion Gap 8 (*)     All other components within normal limits   TROPONIN   BRAIN NATRIURETIC PEPTIDE   COVID-19 AMBULATORY       EMERGENCY DEPARTMENT COURSE:   Vitals:    Vitals:    12/28/20 1348 12/28/20 1402 12/28/20 1417 12/28/20 1447   BP: (!) 148/78 (!) 161/66 (!) 165/74 (!) 158/84   Pulse: 59 57 56    Resp: 19 18 15    Temp:       TempSrc:       SpO2: 100% 100% 100%    Weight:       Height:         Patient history and physical exam taken at bedside, discussed patient symptoms and exam findings, discussed initial work-up to include EKG chest x-ray blood work, IV access. Will give DuoNeb breathing treatment, IV Solu-Medrol.   Patient placed on cardiac monitor, pulse ox, we will continue with O2 at 3.5 L/min as per his home dosing, acknowledged    EKG and chest x-ray as above    Patient evaluated after DuoNeb breathing treatment does show improvement throughout, maintaining good pulse oximetry on his baseline 3.5 L/min nasal cannula    Lab work-up reviewed    This patient is showing good pulse oximetry, no gross tachypnea or increased work of breathing, will order an outpatient Covid swab    Discussed with patient his overall work-up, discussed exacerbation of COPD, discussed would like to get outpatient oral Covid swab in the interim due to his comorbidities, at this time will go ahead and discharge patient home, continue with home nebulized treatments every 4 hours, will start patient on steroid burst, due to patient's extensive comorbidities and noting a left shift in his differential of white blood cells of 86%, will initiate patient on azithromycin as may have benefit in COPD patients as well as an anti-inflammatory as well as potential for early infection.   Patient be discharged home, outpatient follow-up, return to ER if symptoms change worse other concerns    FINAL IMPRESSION      1. COPD exacerbation ([address]          DISPOSITION/PLAN   D/c    PATIENT REFERRED TO:  David Harmon DO  [address] [facility] [id number]    Call       [facility] ED  [address] [facility]  [phone]    As needed, If symptoms worsen      DISCHARGE MEDICATIONS:  Discharge Medication List as of 12/28/2020  2:53 PM      START taking these medications    Details   azithromycin (ZITHROMAX) 250 MG tablet Take 1 tablet by mouth See Admin Instructions for 5 days 500mg on day 1 followed by 250mg on days 2 - 5, Disp-6 tablet, R-0Normal      predniSONE (DELTASONE) 20 MG tablet Take 3 tablets by mouth daily for 5 days, Disp-15 tablet, R-0Normal                 Summation      Patient Course:  D/c    ED Medications administered this visit:    Medications   methylPREDNISolone sodium (SOLU-MEDROL) injection 125 mg (125 mg Intravenous Given 12/28/20 1251)   ipratropium-albuterol (DUONEB) nebulizer solution 1 ampule (1 ampule Inhalation Given 12/28/20 1256)       New Prescriptions from this visit:    Discharge Medication List as of 12/28/2020  2:53 PM      START taking these medications    Details   azithromycin (ZITHROMAX) 250 MG tablet Take 1 tablet by mouth See Admin Instructions for 5 days 500mg on day 1 followed by 250mg on days 2 - 5, Disp-6 tablet, R-0Normal      predniSONE (DELTASONE) 20 MG tablet Take 3 tablets by mouth daily for 5 days, Disp-15 tablet, R-0Normal             Follow-up:  Kaylene Alvarez  [address] [id number]    Call       [facility] ED  [address] [facility]

## 2020-12-30 LAB — SARS-COV-2, NAA: NOT DETECTED

## 2021-01-05 ENCOUNTER — CARE COORDINATION (OUTPATIENT)
Dept: CARE COORDINATION | Age: 71
End: 2021-01-05

## 2021-01-05 NOTE — CARE COORDINATION
Outreach call to patient to follow up on [facility] ED 2020, spoke to patient's wife Jackson Torres who states that patient is doing a little better. She states they were called and told that his COVID test was Negative. Patient contacted regarding COVID-19 risk and screening. Discussed COVID-19 related testing which was available at this time. Test results were negative. Patient informed of results, if available? Yes. Care Transition Nurse/ Ambulatory Care Manager contacted the family by telephone to perform follow-up assessment. Verified name and  with family as identifiers. Patient has following risk factors of: COPD. Symptoms reviewed with family who verbalized the following symptoms: no new symptoms and no worsening symptoms. Due to no new or worsening symptoms encounter was not routed to provider for escalation. Education provided regarding infection prevention, and signs and symptoms of COVID-19 and when to seek medical attention with family who verbalized understanding. Discussed exposure protocols and quarantine from [address] you at higher risk for severe illness  and given an opportunity for questions and concerns. The family agrees to contact the COVID-19 hotline [phone] or PCP office for questions related to their healthcare. CTN/ACM provided contact information for future reference. From CDC: Are you at higher risk for severe illness?  Wash your hands often.  Avoid close contact (6 feet, which is about two arm lengths) with people who are sick.  Put distance between yourself and other people if COVID-19 is spreading in your community.  Clean and disinfect frequently touched surfaces.  Avoid all cruise travel and non-essential air travel.  Call your healthcare professional if you have concerns about COVID-19 and your underlying condition or if you are sick.     For more information on steps you can take to protect yourself, see CDC's How to Protect Yourself      Plan for follow-up call in 5-7 days based on severity of symptoms and risk factors.

## 2021-01-12 ENCOUNTER — CARE COORDINATION (OUTPATIENT)
Dept: CARE COORDINATION | Age: 71
End: 2021-01-12

## 2021-01-12 ENCOUNTER — APPOINTMENT (OUTPATIENT)
Dept: GENERAL RADIOLOGY | Age: 71
End: 2021-01-12
Payer: OTHER GOVERNMENT

## 2021-01-12 ENCOUNTER — HOSPITAL ENCOUNTER (EMERGENCY)
Age: 71
Discharge: HOME | End: 2021-01-12
Attending: FAMILY MEDICINE
Payer: OTHER GOVERNMENT

## 2021-01-12 VITALS
TEMPERATURE: 98.6 F | OXYGEN SATURATION: 98 % | DIASTOLIC BLOOD PRESSURE: 77 MMHG | WEIGHT: 160 LBS | HEIGHT: 70 IN | HEART RATE: 86 BPM | BODY MASS INDEX: 22.9 KG/M2 | SYSTOLIC BLOOD PRESSURE: 133 MMHG | RESPIRATION RATE: 23 BRPM

## 2021-01-12 DIAGNOSIS — I48.92 ATRIAL FLUTTER WITH RAPID VENTRICULAR RESPONSE (HCC): Primary | ICD-10-CM

## 2021-01-12 LAB
ABSOLUTE EOS #: 0 K/UL (ref 0–0.4)
ABSOLUTE IMMATURE GRANULOCYTE: ABNORMAL K/UL (ref 0–0.3)
ABSOLUTE LYMPH #: 1.2 K/UL (ref 1–4.8)
ABSOLUTE MONO #: 0.7 K/UL (ref 0–1)
ANION GAP SERPL CALCULATED.3IONS-SCNC: 6 MMOL/L (ref 9–17)
BASOPHILS # BLD: 2 % (ref 0–2)
BASOPHILS ABSOLUTE: 0.2 K/UL (ref 0–0.2)
BUN BLDV-MCNC: 17 MG/DL (ref 8–23)
BUN/CREAT BLD: 23 (ref 9–20)
CALCIUM SERPL-MCNC: 10.2 MG/DL (ref 8.6–10.4)
CHLORIDE BLD-SCNC: 100 MMOL/L (ref 98–107)
CO2: 33 MMOL/L (ref 20–31)
CREAT SERPL-MCNC: 0.75 MG/DL (ref 0.7–1.2)
DIFFERENTIAL TYPE: YES
EOSINOPHILS RELATIVE PERCENT: 0 % (ref 0–5)
GFR AFRICAN AMERICAN: >60 ML/MIN
GFR NON-AFRICAN AMERICAN: >60 ML/MIN
GFR SERPL CREATININE-BSD FRML MDRD: ABNORMAL ML/MIN/{1.73_M2}
GFR SERPL CREATININE-BSD FRML MDRD: ABNORMAL ML/MIN/{1.73_M2}
GLUCOSE BLD-MCNC: 108 MG/DL (ref 70–99)
HCT VFR BLD CALC: 42.9 % (ref 41–53)
HEMOGLOBIN: 14.3 G/DL (ref 13.5–17.5)
IMMATURE GRANULOCYTES: ABNORMAL %
LYMPHOCYTES # BLD: 9 % (ref 13–44)
MCH RBC QN AUTO: 28.8 PG (ref 26–34)
MCHC RBC AUTO-ENTMCNC: 33.2 G/DL (ref 31–37)
MCV RBC AUTO: 86.6 FL (ref 80–100)
MONOCYTES # BLD: 5 % (ref 5–9)
NRBC AUTOMATED: ABNORMAL PER 100 WBC
PDW BLD-RTO: 14.8 % (ref 12.1–15.2)
PLATELET # BLD: 193 K/UL (ref 140–450)
PLATELET ESTIMATE: ABNORMAL
PMV BLD AUTO: ABNORMAL FL (ref 6–12)
POTASSIUM SERPL-SCNC: 5.1 MMOL/L (ref 3.7–5.3)
RBC # BLD: 4.96 M/UL (ref 4.5–5.9)
RBC # BLD: ABNORMAL 10*6/UL
SARS-COV-2, RAPID: NOT DETECTED
SARS-COV-2: NORMAL
SARS-COV-2: NORMAL
SEG NEUTROPHILS: 84 % (ref 39–75)
SEGMENTED NEUTROPHILS ABSOLUTE COUNT: 11.1 K/UL (ref 2.1–6.5)
SODIUM BLD-SCNC: 139 MMOL/L (ref 135–144)
SOURCE: NORMAL
TROPONIN INTERP: NORMAL
TROPONIN T: <0.03 NG/ML
TROPONIN, HIGH SENSITIVITY: NORMAL NG/L (ref 0–22)
WBC # BLD: 13.2 K/UL (ref 3.5–11)
WBC # BLD: ABNORMAL 10*3/UL

## 2021-01-12 PROCEDURE — 96374 THER/PROPH/DIAG INJ IV PUSH: CPT

## 2021-01-12 PROCEDURE — 71045 X-RAY EXAM CHEST 1 VIEW: CPT

## 2021-01-12 PROCEDURE — 84484 ASSAY OF TROPONIN QUANT: CPT

## 2021-01-12 PROCEDURE — 6370000000 HC RX 637 (ALT 250 FOR IP): Performed by: FAMILY MEDICINE

## 2021-01-12 PROCEDURE — 80048 BASIC METABOLIC PNL TOTAL CA: CPT

## 2021-01-12 PROCEDURE — 2500000003 HC RX 250 WO HCPCS: Performed by: FAMILY MEDICINE

## 2021-01-12 PROCEDURE — 93005 ELECTROCARDIOGRAM TRACING: CPT | Performed by: FAMILY MEDICINE

## 2021-01-12 PROCEDURE — 85025 COMPLETE CBC W/AUTO DIFF WBC: CPT

## 2021-01-12 PROCEDURE — 99284 EMERGENCY DEPT VISIT MOD MDM: CPT

## 2021-01-12 PROCEDURE — C9803 HOPD COVID-19 SPEC COLLECT: HCPCS

## 2021-01-12 PROCEDURE — U0002 COVID-19 LAB TEST NON-CDC: HCPCS

## 2021-01-12 RX ORDER — DILTIAZEM HYDROCHLORIDE 180 MG/1
180 CAPSULE, COATED, EXTENDED RELEASE ORAL DAILY
Qty: 30 CAPSULE | Refills: 0 | Status: SHIPPED | OUTPATIENT
Start: 2021-01-12 | End: 2021-03-15 | Stop reason: ALTCHOICE

## 2021-01-12 RX ORDER — DILTIAZEM HYDROCHLORIDE 5 MG/ML
20 INJECTION INTRAVENOUS ONCE
Status: COMPLETED | OUTPATIENT
Start: 2021-01-12 | End: 2021-01-12

## 2021-01-12 RX ORDER — DILTIAZEM HYDROCHLORIDE 180 MG/1
180 CAPSULE, COATED, EXTENDED RELEASE ORAL DAILY
Status: DISCONTINUED | OUTPATIENT
Start: 2021-01-12 | End: 2021-01-12 | Stop reason: HOSPADM

## 2021-01-12 RX ADMIN — DILTIAZEM HYDROCHLORIDE 20 MG: 5 INJECTION INTRAVENOUS at 18:15

## 2021-01-12 RX ADMIN — DILTIAZEM HYDROCHLORIDE 180 MG: 180 CAPSULE, COATED, EXTENDED RELEASE ORAL at 19:40

## 2021-01-12 ASSESSMENT — ENCOUNTER SYMPTOMS
COUGH: 1
VOMITING: 0
NAUSEA: 0

## 2021-01-12 NOTE — ED PROVIDER NOTES
[facility] ED  EMERGENCY DEPARTMENT ENCOUNTER      Pt Name: Luis Tyler  MRN: [id number]  [place] 1950  Date of evaluation: 1/12/2021  Provider: Leah Abreu MD    CHIEF COMPLAINT       Chief Complaint   Patient presents with    Atrial Fibrillation     pt HR has been running in the 150-160 since about 1400 today         HISTORY OF PRESENT ILLNESS   (Location/Symptom, Timing/Onset, Context/Setting, Quality, Duration, Modifying Factors, Severity)  Note limiting factors. Luis Tyler is a 65 y.o. male who presents to the emergency department rapid heart rate which started approximately 2:00 this afternoon while he was folding laundry. Denies any chest pain. Was getting a headache from it. He has a history of paroxysmal atrial fib, he is not anticoagulated but does take an aspirin daily. He sees his family doctor but does not see a cardiologist, he is set to see a cardiologist at the [place] at some point in the future. .  Patient is also developed a pretty harsh cough productive of yellow sputum. He is a long-term cigarette smoker and continues to smoke \"2 puffs\" per day    HPI    Nursing Notes were reviewed. REVIEW OF SYSTEMS    (2-9 systems for level 4, 10 or more for level 5)     Review of Systems   Constitutional: Negative for fever. Respiratory: Positive for cough. Cardiovascular: Positive for palpitations. Gastrointestinal: Negative for nausea and vomiting. All other systems reviewed and are negative. Except as noted above the remainder of the review of systems was reviewed and negative.        PAST MEDICAL HISTORY     Past Medical History:   Diagnosis Date    Abdominal aneurysm ([address]     x2    Aortic aneurysm ([address]     Atrial fibrillation (HCC)     Chicken pox     Chronic back pain     COPD (chronic obstructive pulmonary disease) (HCC)     Emphysema of lung (HCC)     Emphysema of lung ([address]     Hyperlipidemia     Hypertension 2015    Osteoarthritis SURGICAL HISTORY       Past Surgical History:   Procedure Laterality Date    CARDIAC CATHETERIZATION Left 08/05/15    Dr. Huang Montalvo 30% disease of the left main trunck, Mild plaque disease in the coronary arteries. Normal LV function, ejection fraction of 60%    EYE SURGERY           CURRENT MEDICATIONS       Discharge Medication List as of 1/12/2021  7:29 PM      CONTINUE these medications which have NOT CHANGED    Details   omeprazole (PRILOSEC) 20 MG delayed release capsule Take 20 mg by mouth 2 times dailyHistorical Med      diltiazem (CARDIZEM) 30 MG tablet Take 1 tablet by mouth 2 times daily, Disp-60 tablet,R-0Print      Cholecalciferol (VITAMIN D3) 2000 units CAPS Take 2,000 Units by mouth dailyHistorical Med      albuterol (PROVENTIL) (2.5 MG/3ML) 0.083% nebulizer solution Take 2.5 mg by nebulization every 6 hours as needed for WheezingHistorical Med      fluticasone (FLONASE) 50 MCG/ACT nasal spray 1 spray by Nasal route daily, Disp-1 Bottle, R-3Normal      rosuvastatin (CRESTOR) 20 MG tablet Take 20 mg by mouth dailyHistorical Med      OXYGEN Inhale 3.5 L into the lungs Historical Med      carvedilol (COREG) 25 MG tablet Take 12.5 mg by mouth 2 times daily (with meals)       aspirin 81 MG chewable tablet Take 1 tablet by mouth daily, Disp-30 tablet, R-3      budesonide-formoterol (SYMBICORT) 160-4.5 MCG/ACT AERO Inhale 2 puffs into the lungs 2 times daily. albuterol (PROVENTIL;VENTOLIN) 90 MCG/ACT inhaler Inhale 2 puffs into the lungs every 6 hours as needed. tiotropium (SPIRIVA HANDIHALER) 18 MCG inhalation capsule Inhale 18 mcg into the lungs daily.              ALLERGIES     Codeine    FAMILY HISTORY       Family History   Problem Relation Age of Onset   Taye Re Cancer Mother         Pancreatic cancer    Cancer Father         Lung    Cancer Brother         Brain cancer, pancreatic cancer, colon cancer          SOCIAL HISTORY       Social History     Socioeconomic History  Marital status:      Spouse name: None    Number of children: None    Years of education: None    Highest education level: None   Occupational History    None   Social Needs    Financial resource strain: None    Food insecurity     Worry: None     Inability: None    Transportation needs     Medical: None     Non-medical: None   Tobacco Use    Smoking status: Current Some Day Smoker     Packs/day: 0.10     Years: 39.00     Pack years: 3.90     Types: Cigarettes     Last attempt to quit: 2/20/2017     Years since quitting: 3.8    Smokeless tobacco: Never Used    Tobacco comment: a couple times a week   Substance and Sexual Activity    Alcohol use: No     Alcohol/week: 0.0 standard drinks     Comment: very little    Drug use: No    Sexual activity: None   Lifestyle    Physical activity     Days per week: None     Minutes per session: None    Stress: None   Relationships    Social connections     Talks on phone: None     Gets together: None     Attends Faith service: None     Active member of club or organization: None     Attends meetings of clubs or organizations: None     Relationship status: None    Intimate partner violence     Fear of current or ex partner: None     Emotionally abused: None     Physically abused: None     Forced sexual activity: None   Other Topics Concern    None   Social History Narrative    None       SCREENINGS        [place] Coma Scale  Eye Opening: Spontaneous  Best Verbal Response: Oriented  Best Motor Response: Obeys commands  [place] Coma Scale Score: 15               PHYSICAL EXAM    (up to 7 for level 4, 8 or more for level 5)     ED Triage Vitals [01/12/21 1634]   BP Temp Temp Source Pulse Resp SpO2 Height Weight   (!) 145/96 98.6 °F (37 °C) Oral 161 26 99 % 5' 10\" (1.778 m) 160 lb (72.6 kg)       Physical Exam  Vitals signs reviewed. Constitutional:       Appearance: He is not ill-appearing. HENT:      Head: Atraumatic. Nose: No congestion. Mouth/Throat:      Mouth: Mucous membranes are moist.      Pharynx: No posterior oropharyngeal erythema. Eyes:      General:         Right eye: No discharge. Left eye: No discharge. Pupils: Pupils are equal, round, and reactive to light. Neck:      Musculoskeletal: Neck supple. No muscular tenderness. Cardiovascular:      Rate and Rhythm: Tachycardia present. Rhythm irregular. Heart sounds: Normal heart sounds. No murmur. Pulmonary:      Effort: Pulmonary effort is normal. No respiratory distress. Breath sounds: Rhonchi present. Comments:  cough  Abdominal:      General: Abdomen is flat. Palpations: Abdomen is soft. Tenderness: There is no abdominal tenderness. Musculoskeletal:         General: No tenderness or signs of injury. Skin:     General: Skin is warm. Capillary Refill: Capillary refill takes less than 2 seconds. Findings: No lesion or rash. Neurological:      General: No focal deficit present. Mental Status: He is alert and oriented to person, place, and time. Cranial Nerves: No cranial nerve deficit. Motor: No weakness. Psychiatric:         Mood and Affect: Mood normal.         Behavior: Behavior normal.         DIAGNOSTIC RESULTS     EKG: All EKG's are interpreted by the Emergency Department Physician who either signs or Co-signs this chart in the absence of a cardiologist.  ECG #1 shows atrial flutter with a 2-1 block at 160 bpm nonspecific T wave abnormality. Normal axis. No acute ST segment abnormality  ECG #2 shows atrial flutter with 4-1 AV block 82 bpm nonspecific ST segment and T wave abnormalities.   Normal axis normal QRS intervals      RADIOLOGY:   Non-plain film images such as CT, Ultrasound and MRI are read by the radiologist. Plain radiographic images are visualized and preliminarily interpreted by the emergency physician with the below findings:        Interpretation per the Radiologist below, if available at the time of this note:    XR CHEST PORTABLE   Final Result      Nonacute portable chest.                     ED BEDSIDE ULTRASOUND:   Performed by ED Physician - none    LABS:  Labs Reviewed   CBC WITH AUTO DIFFERENTIAL - Abnormal; Notable for the following components:       Result Value    WBC 13.2 (*)     Seg Neutrophils 84 (*)     Lymphocytes 9 (*)     Segs Absolute 11.10 (*)     All other components within normal limits   BASIC METABOLIC PANEL W/ REFLEX TO MG FOR LOW K - Abnormal; Notable for the following components:    Glucose 108 (*)     Bun/Cre Ratio 23 (*)     CO2 33 (*)     Anion Gap 6 (*)     All other components within normal limits   TROPONIN   COVID-19       All other labs were within normal range or not returned as of this dictation. EMERGENCY DEPARTMENT COURSE and DIFFERENTIAL DIAGNOSIS/MDM:   Vitals:    Vitals:    01/12/21 1832 01/12/21 1848 01/12/21 1903 01/12/21 1917   BP: 100/64 123/65 119/78 133/77   Pulse: 73 69 74 86   Resp: 22 23 21 23   Temp:       TempSrc:       SpO2: 98% 99% 98% 98%   Weight:       Height:               MDM  Number of Diagnoses or Management Options  Atrial flutter with rapid ventricular response (HCC)  Diagnosis management comments: Milligrams of Cardizem intravenously took him from a 2-1 block to a 4-1 block. Asymptomatic thereafter. When I was going into talk to him after about 3 hours, he converted to sinus rhythm while I was in the room. Instead of the 2 30 mg tablets of Cardizem I am to place him on Cardizem  also gave him a follow-up with Dr. Krystal Newsome   Patient had a high probability of clinically significant or life threat deterioration which required my urgent intervention total critical care time was approximately 30 minutes excluding separately reportable procedures    CONSULTS:  None    PROCEDURES:  Unless otherwise noted below, none     Procedures      FINAL IMPRESSION      1.  Atrial flutter with rapid ventricular response ([facility] Controlled         DISPOSITION/PLAN   DISPOSITION Decision To Discharge 01/12/2021 07:17:47 PM      PATIENT REFERRED TO:  Caryle Roys, MD  [place] [id number]  [phone]    Schedule an appointment as soon as possible for a visit         DISCHARGE MEDICATIONS:  Discharge Medication List as of 1/12/2021  7:29 PM      START taking these medications    Details   dilTIAZem (CARDIZEM CD) 180 MG extended release capsule Take 1 capsule by mouth daily, Disp-30 capsule, R-0Normal           Controlled Substances Monitoring:     No flowsheet data found.     (Please note that portions of this note were completed with a voice recognition program.  Efforts were made to edit the dictations but occasionally words are mis-transcribed.)    Maday Song MD (electronically signed)  Attending Emergency Physician           Maday Song MD  01/13/21 [id number]

## 2021-01-12 NOTE — CARE COORDINATION
Outreach call to follow up with patient for follow up ED visit/COVID monitoring, no answer, left detailed vm to return call to this nurse at [phone]. Return call from patient's wife Eder Pearson who states that patient started getting a headache and she checked his pulse ox 95% and . She states that he goes into Atrial fib all the time. Wife states that she has not called his PCP because she knows they will tell him to go to the ED and he doesn't want to go because there is COVID there. Patient states he has had 4 cups of coffee today, wife told him that he knows he can't drink that much coffee. Nurse inquired what his blood pressure was and wife was in process of checking it and she states that he doesn't look good and he is sweating. Instructed her to hang up the phone and to call 911. Instructed her to not drive him there because if something happens and he goes unconscious she can't help him and that the EMS can help him. Wife verbalized understanding and stated that she will call 911. ACM will follow up tomorrow.

## 2021-01-13 ENCOUNTER — CARE COORDINATION (OUTPATIENT)
Dept: CARE COORDINATION | Age: 71
End: 2021-01-13

## 2021-01-13 LAB
EKG ATRIAL RATE: 161 BPM
EKG ATRIAL RATE: 328 BPM
EKG P AXIS: -108 DEGREES
EKG Q-T INTERVAL: 296 MS
EKG Q-T INTERVAL: 330 MS
EKG QRS DURATION: 120 MS
EKG QRS DURATION: 76 MS
EKG QTC CALCULATION (BAZETT): 385 MS
EKG QTC CALCULATION (BAZETT): 484 MS
EKG R AXIS: 80 DEGREES
EKG R AXIS: 84 DEGREES
EKG T AXIS: 118 DEGREES
EKG T AXIS: 81 DEGREES
EKG VENTRICULAR RATE: 161 BPM
EKG VENTRICULAR RATE: 82 BPM

## 2021-01-13 PROCEDURE — 93010 ELECTROCARDIOGRAM REPORT: CPT | Performed by: INTERNAL MEDICINE

## 2021-01-13 NOTE — CARE COORDINATION
Patient to [facility] ED 2021 with rapid heart rate. COVID test Negative. FINAL IMPRESSION       1. Atrial flutter with rapid ventricular response (HCC) Controlled     Outreach reach call to patient and spoke to patient's wife who states ETthat he was fine till after he got home last night and his heart became irregular again. She has not picked up the new cardizem 180 mg XT, she was getting ready to go get it. She states she called his doctor at the [place] and she had called them on Monday and has not gotten a call back. She states she is concerned about giving it to him with his /51, HR 78. She said the ED told him to follow up with cardio Dr Lala Lyn, who he saw years ago. [facility] gave her the office number and instructed her to call them when hanging up the phone. States she will call them since patient doesn't want to go back to the ED. Instructed her to take him back if she doesn't speak to cardio office and his pressure goes lower and his heart rate is still irregular. Encouraged her to call this nurse if she has other concerns or questions. Patient contacted regarding COVID-19 risk and screening. Discussed COVID-19 related testing which was available at this time. Test results were negative. Patient informed of results, if available? Yes. Care Transition Nurse/ Ambulatory Care Manager contacted the wife by telephone to perform follow-up assessment. Verified name and  with wife as identifiers. Patient has following risk factors of: COPD. Symptoms reviewed with wife who verbalized the following symptoms: no worsening symptoms. Due to no new or worsening symptoms encounter was not routed to provider for escalation. Education provided regarding infection prevention, and signs and symptoms of COVID-19 and when to seek medical attention with wife who verbalized understanding.  Discussed exposure protocols and quarantine from [address] you at higher risk for severe illness [address] and given an opportunity for questions and concerns. The wife agrees to contact the COVID-19 hotline [phone] or PCP office for questions related to their healthcare. CTN/ACM provided contact information for future reference. From CDC: Are you at higher risk for severe illness?  Wash your hands often.  Avoid close contact (6 feet, which is about two arm lengths) with people who are sick.  Put distance between yourself and other people if COVID-19 is spreading in your community.  Clean and disinfect frequently touched surfaces.  Avoid all cruise travel and non-essential air travel.  Call your healthcare professional if you have concerns about COVID-19 and your underlying condition or if you are sick. For more information on steps you can take to protect yourself, see CDC's How to [address] will follow up with patient or wife tomorrow.

## 2021-01-14 ENCOUNTER — CARE COORDINATION (OUTPATIENT)
Dept: CARE COORDINATION | Age: 71
End: 2021-01-14

## 2021-01-14 DIAGNOSIS — R06.02 SOB (SHORTNESS OF BREATH): ICD-10-CM

## 2021-01-14 DIAGNOSIS — J44.9 CHRONIC OBSTRUCTIVE PULMONARY DISEASE, UNSPECIFIED COPD TYPE (HCC): Primary | ICD-10-CM

## 2021-01-14 DIAGNOSIS — E78.5 HYPERLIPIDEMIA, UNSPECIFIED HYPERLIPIDEMIA TYPE: ICD-10-CM

## 2021-01-14 DIAGNOSIS — E55.9 VITAMIN D DEFICIENCY: ICD-10-CM

## 2021-01-14 DIAGNOSIS — R00.0 TACHYCARDIA: ICD-10-CM

## 2021-01-14 DIAGNOSIS — I48.0 PAROXYSMAL ATRIAL FIBRILLATION (HCC): ICD-10-CM

## 2021-01-14 DIAGNOSIS — I71.40 ANEURYSM OF SUPRARENAL AORTA: ICD-10-CM

## 2021-01-22 ENCOUNTER — CARE COORDINATION (OUTPATIENT)
Dept: CARE COORDINATION | Age: 71
End: 2021-01-22

## 2021-01-22 NOTE — CARE COORDINATION
Your Patient resolved from the Care Transitions episode on 1/22/2021  Patient/family has been provided the following resources and education related to COVID-19:                         Signs, symptoms and red flags related to COVID-19            CDC exposure and quarantine guidelines            Con[facility]it exposure contact - [phone]            Contact for their local Department of Health                 Patient currently reports that the following symptoms have improved:  no new/worsening symptoms     Spoke to patient's wife who states patient has been doing better. He has cardio appt 2/8/2021. He also, has appt with the [place]. No further outreach scheduled with this CTN/ACM. Episode of Care resolved. Patient has this CTN/ACM contact information if future needs arise.

## 2021-01-28 ENCOUNTER — HOSPITAL ENCOUNTER (OUTPATIENT)
Age: 71
Discharge: HOME | End: 2021-01-28
Payer: MEDICARE

## 2021-01-28 ENCOUNTER — HOSPITAL ENCOUNTER (OUTPATIENT)
Dept: NON INVASIVE DIAGNOSTICS | Age: 71
Discharge: HOME | End: 2021-01-28
Payer: MEDICARE

## 2021-01-28 DIAGNOSIS — I71.40 ANEURYSM OF SUPRARENAL AORTA: ICD-10-CM

## 2021-01-28 DIAGNOSIS — J44.9 CHRONIC OBSTRUCTIVE PULMONARY DISEASE, UNSPECIFIED COPD TYPE (HCC): ICD-10-CM

## 2021-01-28 DIAGNOSIS — I48.0 PAROXYSMAL ATRIAL FIBRILLATION (HCC): ICD-10-CM

## 2021-01-28 DIAGNOSIS — R06.02 SOB (SHORTNESS OF BREATH): ICD-10-CM

## 2021-01-28 DIAGNOSIS — R00.0 TACHYCARDIA: ICD-10-CM

## 2021-01-28 LAB
LV EF: 55 %
LVEF MODALITY: NORMAL

## 2021-01-28 PROCEDURE — 93306 TTE W/DOPPLER COMPLETE: CPT

## 2021-01-28 PROCEDURE — 93225 XTRNL ECG REC<48 HRS REC: CPT

## 2021-01-28 PROCEDURE — 93005 ELECTROCARDIOGRAM TRACING: CPT

## 2021-01-28 PROCEDURE — 93226 XTRNL ECG REC<48 HR SCAN A/R: CPT

## 2021-01-31 LAB
EKG ATRIAL RATE: 374 BPM
EKG Q-T INTERVAL: 332 MS
EKG QRS DURATION: 76 MS
EKG QTC CALCULATION (BAZETT): 449 MS
EKG R AXIS: 83 DEGREES
EKG T AXIS: 91 DEGREES
EKG VENTRICULAR RATE: 110 BPM

## 2021-01-31 PROCEDURE — 93010 ELECTROCARDIOGRAM REPORT: CPT | Performed by: INTERNAL MEDICINE

## 2021-02-08 LAB
ACQUISITION DURATION: NORMAL S
AVERAGE HEART RATE: 82 BPM
EKG DIAGNOSIS: NORMAL
FASTEST SUPRAVENTRICULAR RATE: 138 BPM
HOLTER MAX HEART RATE: 184 BPM
HOOKUP DATE: NORMAL
HOOKUP TIME: NORMAL
LONGEST SUPRAVENTRICULAR RATE: 138 BPM
MAX HEART RATE TIME/DATE: NORMAL
MIN HEART RATE TIME/DATE: NORMAL
MIN HEART RATE: 44 BPM
NUMBER OF FASTEST SUPRAVENTRICULAR BEATS: 4
NUMBER OF LONGEST SUPRAVENTRICULAR BEATS: 4
NUMBER OF QRS COMPLEXES: NORMAL
NUMBER OF SUPRAVENTRICULAR BEATS IN RUNS: 4
NUMBER OF SUPRAVENTRICULAR COUPLETS: 317
NUMBER OF SUPRAVENTRICULAR ECTOPICS: 3384
NUMBER OF SUPRAVENTRICULAR ISOLATED BEATS: 2746
NUMBER OF SUPRAVENTRICULAR RUNS: 1
NUMBER OF VENTRICULAR BEATS IN RUNS: 0
NUMBER OF VENTRICULAR BIGEMINAL CYCLES: 0
NUMBER OF VENTRICULAR COUPLETS: 3
NUMBER OF VENTRICULAR ECTOPICS: 151
NUMBER OF VENTRICULAR ISOLATED BEATS: 145
NUMBER OF VENTRICULAR RUNS: 0

## 2021-03-12 ENCOUNTER — HOSPITAL ENCOUNTER (OUTPATIENT)
Age: 71
Discharge: HOME | End: 2021-03-12
Payer: MEDICARE

## 2021-03-12 DIAGNOSIS — R06.02 SOB (SHORTNESS OF BREATH): ICD-10-CM

## 2021-03-12 DIAGNOSIS — E55.9 VITAMIN D DEFICIENCY: ICD-10-CM

## 2021-03-12 DIAGNOSIS — R00.0 TACHYCARDIA: ICD-10-CM

## 2021-03-12 DIAGNOSIS — I71.40 ANEURYSM OF SUPRARENAL AORTA: ICD-10-CM

## 2021-03-12 DIAGNOSIS — I48.0 PAROXYSMAL ATRIAL FIBRILLATION (HCC): ICD-10-CM

## 2021-03-12 DIAGNOSIS — J44.9 CHRONIC OBSTRUCTIVE PULMONARY DISEASE, UNSPECIFIED COPD TYPE (HCC): ICD-10-CM

## 2021-03-12 DIAGNOSIS — E78.5 HYPERLIPIDEMIA, UNSPECIFIED HYPERLIPIDEMIA TYPE: ICD-10-CM

## 2021-03-12 LAB
ALBUMIN SERPL-MCNC: 4.3 G/DL (ref 3.5–5.2)
ALBUMIN/GLOBULIN RATIO: ABNORMAL (ref 1–2.5)
ALP BLD-CCNC: 70 U/L (ref 40–129)
ALT SERPL-CCNC: 13 U/L (ref 5–41)
ANION GAP SERPL CALCULATED.3IONS-SCNC: 5 MMOL/L (ref 9–17)
AST SERPL-CCNC: 16 U/L
BILIRUB SERPL-MCNC: 0.36 MG/DL (ref 0.3–1.2)
BUN BLDV-MCNC: 12 MG/DL (ref 8–23)
BUN/CREAT BLD: 15 (ref 9–20)
CALCIUM SERPL-MCNC: 9.8 MG/DL (ref 8.6–10.4)
CHLORIDE BLD-SCNC: 100 MMOL/L (ref 98–107)
CHOLESTEROL/HDL RATIO: 2.1
CHOLESTEROL: 150 MG/DL
CO2: 36 MMOL/L (ref 20–31)
CREAT SERPL-MCNC: 0.8 MG/DL (ref 0.7–1.2)
GFR AFRICAN AMERICAN: >60 ML/MIN
GFR NON-AFRICAN AMERICAN: >60 ML/MIN
GFR SERPL CREATININE-BSD FRML MDRD: ABNORMAL ML/MIN/{1.73_M2}
GFR SERPL CREATININE-BSD FRML MDRD: ABNORMAL ML/MIN/{1.73_M2}
GLUCOSE BLD-MCNC: 139 MG/DL (ref 70–99)
HDLC SERPL-MCNC: 70 MG/DL
LDL CHOLESTEROL: 61 MG/DL (ref 0–130)
MAGNESIUM: 1.8 MG/DL (ref 1.6–2.6)
POTASSIUM SERPL-SCNC: 4.6 MMOL/L (ref 3.7–5.3)
SODIUM BLD-SCNC: 141 MMOL/L (ref 135–144)
TOTAL PROTEIN: 7.5 G/DL (ref 6.4–8.3)
TRIGL SERPL-MCNC: 95 MG/DL
TSH SERPL DL<=0.05 MIU/L-ACNC: 1.14 MIU/L (ref 0.3–5)
VITAMIN D 25-HYDROXY: 53 NG/ML (ref 30–100)
VLDLC SERPL CALC-MCNC: NORMAL MG/DL (ref 1–30)

## 2021-03-12 PROCEDURE — 36415 COLL VENOUS BLD VENIPUNCTURE: CPT

## 2021-03-12 PROCEDURE — 82306 VITAMIN D 25 HYDROXY: CPT

## 2021-03-12 PROCEDURE — 80053 COMPREHEN METABOLIC PANEL: CPT

## 2021-03-12 PROCEDURE — 80061 LIPID PANEL: CPT

## 2021-03-12 PROCEDURE — 83735 ASSAY OF MAGNESIUM: CPT

## 2021-03-12 PROCEDURE — 84443 ASSAY THYROID STIM HORMONE: CPT

## 2021-03-15 ENCOUNTER — OFFICE VISIT (OUTPATIENT)
Dept: CARDIOLOGY CLINIC | Age: 71
End: 2021-03-15
Payer: MEDICARE

## 2021-03-15 VITALS
SYSTOLIC BLOOD PRESSURE: 160 MMHG | DIASTOLIC BLOOD PRESSURE: 80 MMHG | WEIGHT: 173 LBS | BODY MASS INDEX: 24.82 KG/M2 | HEART RATE: 60 BPM | OXYGEN SATURATION: 90 %

## 2021-03-15 DIAGNOSIS — I48.92 PAROXYSMAL ATRIAL FLUTTER (HCC): Primary | ICD-10-CM

## 2021-03-15 PROCEDURE — 1123F ACP DISCUSS/DSCN MKR DOCD: CPT | Performed by: INTERNAL MEDICINE

## 2021-03-15 PROCEDURE — G8420 CALC BMI NORM PARAMETERS: HCPCS | Performed by: INTERNAL MEDICINE

## 2021-03-15 PROCEDURE — 99213 OFFICE O/P EST LOW 20 MIN: CPT | Performed by: INTERNAL MEDICINE

## 2021-03-15 PROCEDURE — 3017F COLORECTAL CA SCREEN DOC REV: CPT | Performed by: INTERNAL MEDICINE

## 2021-03-15 PROCEDURE — 4040F PNEUMOC VAC/ADMIN/RCVD: CPT | Performed by: INTERNAL MEDICINE

## 2021-03-15 PROCEDURE — G8484 FLU IMMUNIZE NO ADMIN: HCPCS | Performed by: INTERNAL MEDICINE

## 2021-03-15 PROCEDURE — 4004F PT TOBACCO SCREEN RCVD TLK: CPT | Performed by: INTERNAL MEDICINE

## 2021-03-15 PROCEDURE — G8427 DOCREV CUR MEDS BY ELIG CLIN: HCPCS | Performed by: INTERNAL MEDICINE

## 2021-03-15 RX ORDER — PREDNISONE 20 MG/1
20 TABLET ORAL DAILY
Status: ON HOLD | COMMUNITY
End: 2022-01-07 | Stop reason: HOSPADM

## 2021-03-15 RX ORDER — DILTIAZEM HYDROCHLORIDE 120 MG/1
120 CAPSULE, COATED, EXTENDED RELEASE ORAL DAILY
Qty: 30 CAPSULE | Refills: 6 | Status: SHIPPED | OUTPATIENT
Start: 2021-03-15 | End: 2021-05-04 | Stop reason: SDUPTHER

## 2021-03-15 NOTE — PROGRESS NOTES
Ov Dr Lala Lyn follow up from   ED a fib in January. Had a holter monitor  Given eliquis but did not   Take it until talking with Dr Lala Lyn  Also given diltiazem 180 mg daily   Did not start still taking 30 mg bid  Last seen [place] cardiologist over  A year ago at the [place]   Not seen DR Lala Lyn for over  3 years. C/o chest pain when having   episodes of a fib   C/o sob on continuous oxygen  3 l at rest 3.5l when walking. For past 3 years. Still smoking.  ekg done in room sinus rhythm  Wife concerned about aneurysm  And blood thinners. Discussed the eliquis and risk of  Stroke. Wants to think about it   Informed to make appt with   Cardiologist at the [place]. Will try diltiazem 120 mg daily. Stop asa if starts eliquis. See prn.

## 2021-03-18 NOTE — PROGRESS NOTES
Braydon Gracia M.D. [address] [place] [address]  [phone]        March 15, 2021      Dr. Venita Pritchard  [address] [place] [address]      RE:   Niels Dancer  :        Dear Dr. Venita Pritchard:    CHIEF COMPLAINT:  1. Paroxysmal atrial flutter. 2.  Not anticoagulated by his desire. HISTORY OF PRESENT ILLNESS:  Mr. Jessica Ruiz is a 18-year-old gentleman who I saw on 2017 for a second opinion regarding anticoagulation for paroxysmal atrial fibrillation. He has a history of chronic COPD and bronchitis and is on 24-hour oxygen. (He continues to smoke, however, is down to \"2 puffs a day\". ..)    In 2015, he had developed chest pain and a stress test was abnormal.  We did a cardiac catheterization on 2015 that showed 30% to 40% disease in the right coronary artery, 30% left main trunk, unremarkable circumflex and LAD, EF 60%, and I did not see him again until 2017. At that time, he developed palpitations and an event recorder on 2017 showed intermittent episodes of atrial fibrillation with 4% burden of atrial fibrillation. He saw a cardiologist at [facility] who recommended anticoagulation with Eliquis because of his CHADS/VASc score of 3. However, he and his wife were hesitant to start anticoagulation and saw me for a second opinion. He had had a history of abdominal aortic aneurysm measuring 3.6 cm. I agreed that he should be anticoagulated and he had decided that he \"probably would start but wanted to talk to cardiology in the [facility]. \"    He had been in the emergency room at various times for exacerbation of COPD. This occurred on 2020 and again on 2020. On 2021, he came to the emergency room because of a heart rate in the 150 to 160 range. He had not had any chest pain or chest discomfort.   Because of his rapid ventricular response, he came to the emergency room.  He was given a prescription for Cardizem  mg daily. He was given 10 mg of Cardizem intravenously and his heart rate decreased from 150 to 100. He did convert to sinus rhythm in the ER and was again discharged on Cardizem  mg daily. He is seeing me in followup. He did have a Holter monitor following his ER visit, which was done on 01/21/2021. This showed recurrent atrial flutter 24% of the time with a controlled ventricular response. When I see him today, he is on 24-hour oxygen. He has fatigue, which is chronic. He denies any chest pain or chest discomfort. He feels that he is \"in atrial flutter all the time. \"    He had been on diltiazem 30 mg twice a day previous to his ER visit and has continued to take his Cardizem 30 mg b.i.d. and did not start his Cardizem  mg daily. He has shortness of breath and is on continuous oxygen at 3 liters at rest and 3.5 liters when walking. He again has had no syncope or near syncope. He has occasional chest pain, but it is not particularly associated with any activity. He has had no cardiac testing since I saw him in 2017. CARDIAC RISK FACTORS:  Known CAD:  Mildly positive. Hypertension:  Positive. Hyperlipidemia:  Positive. Peripheral Vascular Disease:  Positive. Smoking:  Positive. Other Family Members:  Positive. Diabetes:  Negative. MEDICATIONS AT HOME:  He is currently on Proventil inhaler p.r.n., Symbicort 160/4.5 two puffs b.i.d., Coreg 25 mg b.i.d., Cardizem 30 mg b.i.d., Prilosec 20 mg b.i.d., oxygen as stated previously, prednisone 20 mg daily, Crestor 20 mg daily, Spiriva daily. PAST MEDICAL AND SURGICAL HISTORY:  1. Cardiac catheterization as stated previously. 2.  Hyperlipidemia. 3.  Chronic back pain. 4.  Prostatic surgery. 5.  Long history of hypertension. 6.  Renal artery stenosis. FAMILY HISTORY:  Mother had cancer. Brother had cancer. Father had cancer.     SOCIAL HISTORY:  He is 65years old,  for the second time. He has 4 children. Smokes \"2 puffs a day. \"  He is a \"retired contractor. \"  He is followed at the [facility] and is seeing [facility] cardiology in [facility] in [facility]. Does not exercise. Very inactive. REVIEW OF SYSTEMS:  Cardiac as above. Other systems reviewed including constitutional, eyes, ears, nose and throat, cardiovascular, respiratory, GI, , musculoskeletal, integumentary, neurologic, endocrine, hematologic and allergic/immunologic are negative except for what is described above. PHYSICAL EXAMINATION:  VITAL SIGNS:  His blood pressure was 160/80 with a heart rate of 60 and regular. Respiratory rate 18. O2 saturation was 90%. Weight 173 pounds. GENERAL:  He is a pleasant 77-year-old gentleman. Denied pain. He was oriented to person, place and time. Answered questions appropriately. SKIN:  No unusual skin changes. HEENT:  The pupils are equally round and intact. Mucous membranes were dry. NECK:  No JVD. Good carotid pulses. No carotid bruits. No lymphadenopathy or thyromegaly. CARDIOVASCULAR EXAM:  S1 and S2 were normal.  No S3 or S4. Soft systolic blowing type murmur. No diastolic murmur. PMI was normal.  No lift, thrust, or pericardial friction rub. LUNGS:  He had diffuse wheezes and crackles bilaterally. ABDOMEN:  Soft and nontender. Good bowel sounds. EXTREMITIES:  Good femoral pulses. Good pedal pulses. He had no pedal edema. Skin was warm and dry. No calf tenderness. Nail beds pink. Good cap refill. PULSES:  Bilateral symmetrical radial, brachial and carotid pulses. No carotid bruits. Good femoral and pedal pulses. NEUROLOGIC EXAM:  Within normal limits. PSYCHIATRIC EXAM:  Within normal limits. LABORATORY DATA:  Sodium was 141, potassium 4.6, BUN 12, creatinine 0.8, GFR greater than 60, calcium was 9.8. His cholesterol was 150, HDL 50, LDL 61, triglycerides 95. ALT was 13, AST was 16. TSH was 1. 14. Vitamin D was 53.0.   His white count was 13.2 with a hemoglobin of 14.3 and a platelet count 625,782. We did an EKG today that showed he was in sinus rhythm, nonspecific ST changes. EKG initially on 01/12 showed atrial flutter with 2:1 conduction. He had an echocardiogram on 01/28/2020 that showed normal LV function with ejection fraction of 55% with mild mitral regurgitation and a PA pressure of 44 mmHg. Chest x-ray on 01/12/2021 was unremarkable. Holter monitor showed atrial flutter with occasional RVR up to 150 and occasional mild bradycardia down to 40, with him in atrial flutter 24% of the time. IMPRESSION:  1. Paroxysmal atrial flutter with occasional RVR, with him being in atrial flutter 24% of the time. 2.  Normal LV function, EF of 55%. 3.  CHADS score of 3.  4.  Mild coronary artery disease by a cardiac catheterization in 08/2015 showing 30% to 40% mid right coronary artery, 30% left main trunk, unremarkable circumflex and LAD, and EF 60%. 5.  COPD, on 24-hour oxygen secondary to asbestos. 6.  Hypertension. 7.  Hyperlipidemia, well controlled. 8.  A 3.6 cm abdominal aortic aneurysm. RECOMMENDATIONS:  1. Stop Cardizem 30 mg b.i.d.  2.  Start Cardizem  mg daily. 3.  Would recommend anticoagulation of course. 4.  Follow up with [facility]. DISCUSSION:  Mr. Alessandra Barnard had many questions, many of which were discussed in 2017 concerning anticoagulation. He is very hesitant to start anticoagulation. He is taking aspirin, which he feels protects him. He of course is at a significant risk of CVA with his recurrent atrial flutter at 24% burden. They had questions concerning whether this would make his \"aneurysm burst.\"  They had understood the [place] cardiologist to say that nothing could be done concerning his abdominal aortic aneurysm because of his overall condition.   At this point, with his aneurysm, aorta measuring 3.6, he of course does not need any intervention at this time, which I am sure

## 2021-05-04 RX ORDER — DILTIAZEM HYDROCHLORIDE 120 MG/1
120 CAPSULE, COATED, EXTENDED RELEASE ORAL DAILY
Qty: 90 CAPSULE | Refills: 3 | Status: SHIPPED | OUTPATIENT
Start: 2021-05-04 | End: 2021-07-20 | Stop reason: SDUPTHER

## 2021-05-17 ENCOUNTER — APPOINTMENT (OUTPATIENT)
Dept: GENERAL RADIOLOGY | Age: 71
End: 2021-05-17
Payer: OTHER GOVERNMENT

## 2021-05-17 ENCOUNTER — APPOINTMENT (OUTPATIENT)
Dept: CT IMAGING | Age: 71
End: 2021-05-17
Payer: OTHER GOVERNMENT

## 2021-05-17 ENCOUNTER — HOSPITAL ENCOUNTER (EMERGENCY)
Age: 71
Discharge: HOME | End: 2021-05-17
Attending: INTERNAL MEDICINE
Payer: OTHER GOVERNMENT

## 2021-05-17 VITALS
SYSTOLIC BLOOD PRESSURE: 122 MMHG | WEIGHT: 171 LBS | TEMPERATURE: 98.6 F | HEART RATE: 78 BPM | OXYGEN SATURATION: 98 % | BODY MASS INDEX: 24.54 KG/M2 | DIASTOLIC BLOOD PRESSURE: 61 MMHG | RESPIRATION RATE: 22 BRPM

## 2021-05-17 DIAGNOSIS — R06.02 SHORTNESS OF BREATH: ICD-10-CM

## 2021-05-17 DIAGNOSIS — Z72.0 TOBACCO ABUSE: ICD-10-CM

## 2021-05-17 DIAGNOSIS — I48.91 ATRIAL FIBRILLATION, UNSPECIFIED TYPE (HCC): Primary | ICD-10-CM

## 2021-05-17 DIAGNOSIS — R60.0 LEG EDEMA: ICD-10-CM

## 2021-05-17 DIAGNOSIS — R79.89 ELEVATED D-DIMER: ICD-10-CM

## 2021-05-17 LAB
ABSOLUTE EOS #: 0.1 K/UL (ref 0–0.4)
ABSOLUTE IMMATURE GRANULOCYTE: ABNORMAL K/UL (ref 0–0.3)
ABSOLUTE LYMPH #: 1.5 K/UL (ref 1–4.8)
ABSOLUTE MONO #: 0.6 K/UL (ref 0–1)
ALBUMIN SERPL-MCNC: 4 G/DL (ref 3.5–5.2)
ALBUMIN/GLOBULIN RATIO: ABNORMAL (ref 1–2.5)
ALP BLD-CCNC: 74 U/L (ref 40–129)
ALT SERPL-CCNC: 32 U/L (ref 5–41)
ANION GAP SERPL CALCULATED.3IONS-SCNC: 6 MMOL/L (ref 9–17)
AST SERPL-CCNC: 28 U/L
BASOPHILS # BLD: 1 % (ref 0–2)
BASOPHILS ABSOLUTE: 0.1 K/UL (ref 0–0.2)
BILIRUB SERPL-MCNC: 0.3 MG/DL (ref 0.3–1.2)
BNP INTERPRETATION: ABNORMAL
BUN BLDV-MCNC: 15 MG/DL (ref 8–23)
BUN/CREAT BLD: 18 (ref 9–20)
CALCIUM SERPL-MCNC: 9.2 MG/DL (ref 8.6–10.4)
CHLORIDE BLD-SCNC: 101 MMOL/L (ref 98–107)
CO2: 37 MMOL/L (ref 20–31)
CREAT SERPL-MCNC: 0.83 MG/DL (ref 0.7–1.2)
D-DIMER QUANTITATIVE: 1.42 MG/L FEU (ref 0–0.59)
DIFFERENTIAL TYPE: YES
EOSINOPHILS RELATIVE PERCENT: 1 % (ref 0–5)
GFR AFRICAN AMERICAN: >60 ML/MIN
GFR NON-AFRICAN AMERICAN: >60 ML/MIN
GFR SERPL CREATININE-BSD FRML MDRD: ABNORMAL ML/MIN/{1.73_M2}
GFR SERPL CREATININE-BSD FRML MDRD: ABNORMAL ML/MIN/{1.73_M2}
GLUCOSE BLD-MCNC: 108 MG/DL (ref 70–99)
HCT VFR BLD CALC: 36.5 % (ref 41–53)
HEMOGLOBIN: 12 G/DL (ref 13.5–17.5)
IMMATURE GRANULOCYTES: ABNORMAL %
INR BLD: 1
LYMPHOCYTES # BLD: 17 % (ref 13–44)
MCH RBC QN AUTO: 28.6 PG (ref 26–34)
MCHC RBC AUTO-ENTMCNC: 33 G/DL (ref 31–37)
MCV RBC AUTO: 86.8 FL (ref 80–100)
MONOCYTES # BLD: 7 % (ref 5–9)
NRBC AUTOMATED: ABNORMAL PER 100 WBC
PDW BLD-RTO: 14.4 % (ref 12.1–15.2)
PLATELET # BLD: 204 K/UL (ref 140–450)
PLATELET ESTIMATE: ABNORMAL
PMV BLD AUTO: ABNORMAL FL (ref 6–12)
POTASSIUM SERPL-SCNC: 3.9 MMOL/L (ref 3.7–5.3)
PRO-BNP: 500 PG/ML
PROTHROMBIN TIME: 13.3 SEC (ref 11.5–14.2)
RBC # BLD: 4.21 M/UL (ref 4.5–5.9)
RBC # BLD: ABNORMAL 10*6/UL
SARS-COV-2, RAPID: NOT DETECTED
SEG NEUTROPHILS: 74 % (ref 39–75)
SEGMENTED NEUTROPHILS ABSOLUTE COUNT: 6.6 K/UL (ref 2.1–6.5)
SODIUM BLD-SCNC: 144 MMOL/L (ref 135–144)
SPECIMEN DESCRIPTION: NORMAL
TOTAL PROTEIN: 6.3 G/DL (ref 6.4–8.3)
TROPONIN INTERP: NORMAL
TROPONIN T: NORMAL NG/ML
TROPONIN, HIGH SENSITIVITY: 6 NG/L (ref 0–22)
WBC # BLD: 8.9 K/UL (ref 3.5–11)
WBC # BLD: ABNORMAL 10*3/UL

## 2021-05-17 PROCEDURE — 85379 FIBRIN DEGRADATION QUANT: CPT

## 2021-05-17 PROCEDURE — 6360000002 HC RX W HCPCS: Performed by: INTERNAL MEDICINE

## 2021-05-17 PROCEDURE — C9803 HOPD COVID-19 SPEC COLLECT: HCPCS

## 2021-05-17 PROCEDURE — 87635 SARS-COV-2 COVID-19 AMP PRB: CPT

## 2021-05-17 PROCEDURE — 85610 PROTHROMBIN TIME: CPT

## 2021-05-17 PROCEDURE — 84484 ASSAY OF TROPONIN QUANT: CPT

## 2021-05-17 PROCEDURE — 85025 COMPLETE CBC W/AUTO DIFF WBC: CPT

## 2021-05-17 PROCEDURE — 93005 ELECTROCARDIOGRAM TRACING: CPT | Performed by: INTERNAL MEDICINE

## 2021-05-17 PROCEDURE — 6360000004 HC RX CONTRAST MEDICATION: Performed by: INTERNAL MEDICINE

## 2021-05-17 PROCEDURE — 80053 COMPREHEN METABOLIC PANEL: CPT

## 2021-05-17 PROCEDURE — 71275 CT ANGIOGRAPHY CHEST: CPT

## 2021-05-17 PROCEDURE — 71045 X-RAY EXAM CHEST 1 VIEW: CPT

## 2021-05-17 PROCEDURE — 99283 EMERGENCY DEPT VISIT LOW MDM: CPT

## 2021-05-17 PROCEDURE — 83880 ASSAY OF NATRIURETIC PEPTIDE: CPT

## 2021-05-17 PROCEDURE — 96374 THER/PROPH/DIAG INJ IV PUSH: CPT

## 2021-05-17 RX ORDER — AMIODARONE HYDROCHLORIDE 50 MG/ML
150 INJECTION, SOLUTION INTRAVENOUS ONCE
Status: COMPLETED | OUTPATIENT
Start: 2021-05-17 | End: 2021-05-17

## 2021-05-17 RX ADMIN — IOPAMIDOL 100 ML: 755 INJECTION, SOLUTION INTRAVENOUS at 18:40

## 2021-05-17 RX ADMIN — AMIODARONE HYDROCHLORIDE 150 MG: 50 INJECTION, SOLUTION INTRAVENOUS at 20:05

## 2021-05-17 ASSESSMENT — PAIN DESCRIPTION - PAIN TYPE: TYPE: ACUTE PAIN

## 2021-05-17 ASSESSMENT — PAIN DESCRIPTION - ORIENTATION: ORIENTATION: RIGHT;LEFT

## 2021-05-17 ASSESSMENT — PAIN DESCRIPTION - FREQUENCY: FREQUENCY: INTERMITTENT

## 2021-05-17 ASSESSMENT — PAIN DESCRIPTION - LOCATION: LOCATION: CHEST

## 2021-05-17 NOTE — ED PROVIDER NOTES
[facility] ED  EMERGENCY DEPARTMENT ENCOUNTER      Pt Name: Chandrika Reyes  MRN: [id number]  [place] 1950  Date of evaluation: 5/17/2021  Provider: John Mallory MD    [address] [place] [address]       Chief Complaint   Patient presents with    Shortness of Breath     more short of breath the last few days. Wears 3L o2 at home    Irregular Heart Beat     hx of afib    Leg Swelling     BLE swelling. Worse than normal.          HISTORY OF PRESENT ILLNESS   (Location/Symptom, Timing/Onset, Context/Setting, Quality, Duration, Modifying Factors, Severity)  Note limiting factors. Chandrika Reyes is a 65 y.o. male who has a history of atrial fibrillation, aneurysm of the suprarenal aorta, hypertension, hyperlipidemia, COPD, presents to the emergency department for evaluation and management of palpitations with racing, shortness of breath which has worsened in the last couple days, and bilateral lower extremity swelling x2 days    He is on oxygen at home at 3 L/min around-the-clock. He was on short-term diltiazem by Dr. Carmen Moran change due to long-term. He has not tried anything for symptoms. She has not seen any other providers for this. This patient is being evaluated during the COVID-19 pandemic. HPI    Nursing Notes were reviewed. REVIEW OF SYSTEMS    (2-9 systems for level 4, 10 or more for level 5)       REVIEW OF SYSTEMS    Constitutional: Negative for fatigue and fever. HENT: Negative for dental problem, ear pain and sore throat. Eyes: Negative for pain and redness. Respiratory: Negative for cough, chest tightness and shortness of breath. Cardiovascular: Negative for chest pain, palpitations and leg swelling. Gastrointestinal: Negative for abdominal distention, abdominal pain, diarrhea, nausea and vomiting. Endocrine: Negative for cold intolerance, heat intolerance, polydipsia, polyphagia and polyuria.    Genitourinary: Negative for difficulty urinating, dysuria, hematuria and urgency. Musculoskeletal: Negative for arthralgias, back pain and neck pain. Skin: Negative for color change, pallor, rash and wound. Allergic/Immunologic: Negative for environmental, medication and food allergies. Neurological: Negative for dizziness, speech difficulty, weakness, distal tingling/numbness and headaches. Hematological: Negative for adenopathy. Does not bruise/bleed easily. Psychiatric/Behavioral: Negative for agitation, anxiety, depression, behavioral problems, confusion, hallucinations and suicidal ideas. Except as noted above the remainder of the review of systems was reviewed and negative. PASTMEDICAL HISTORY     Past Medical History:   Diagnosis Date    Abdominal aneurysm ([facility] [address]     x2    Aortic aneurysm (HCC)     Atrial fibrillation ([facility])     Chicken pox     Chronic back pain     COPD (chronic obstructive pulmonary disease) ([facility])     Emphysema of lung ([facility])     Emphysema of lung ([facility] [facility]     Hyperlipidemia     Hypertension 2015    Osteoarthritis          SURGICAL HISTORY       Past Surgical History:   Procedure Laterality Date    CARDIAC CATHETERIZATION Left 08/05/15    Dr. Nate Leon 30% disease of the left main trunck, Mild plaque disease in the coronary arteries. Normal LV function, ejection fraction of 60%    EYE SURGERY           CURRENT MEDICATIONS       Previous Medications    ALBUTEROL (PROVENTIL) (2.5 MG/3ML) 0.083% NEBULIZER SOLUTION    Take 2.5 mg by nebulization every 6 hours as needed for Wheezing    ALBUTEROL (PROVENTIL;VENTOLIN) 90 MCG/ACT INHALER    Inhale 2 puffs into the lungs every 6 hours as needed. BUDESONIDE-FORMOTEROL (SYMBICORT) 160-4.5 MCG/ACT AERO    Inhale 2 puffs into the lungs 2 times daily.     CARVEDILOL (COREG) 25 MG TABLET    Take 25 mg by mouth 2 times daily (with meals)     DILTIAZEM (CARDIZEM CD) 120 MG EXTENDED RELEASE CAPSULE    Take 1 capsule by mouth daily    FLUTICASONE (FLONASE) 50 MCG/ACT NASAL SPRAY    1 spray by Nasal route daily    OMEPRAZOLE (PRILOSEC) 20 MG DELAYED RELEASE CAPSULE    Take 20 mg by mouth 2 times daily    OXYGEN    Inhale 3.5 L into the lungs     PREDNISONE (DELTASONE) 20 MG TABLET    Take 20 mg by mouth daily    ROSUVASTATIN (CRESTOR) 20 MG TABLET    Take 20 mg by mouth daily    TIOTROPIUM (SPIRIVA HANDIHALER) 18 MCG INHALATION CAPSULE    Inhale 18 mcg into the lungs daily. ALLERGIES     Codeine and Atorvastatin    FAMILY HISTORY       Family History   Problem Relation Age of Onset    Cancer Mother         Pancreatic cancer    Cancer Father         Lung    Cancer Brother         Brain cancer, pancreatic cancer, colon cancer          SOCIAL HISTORY       Social History     Socioeconomic History    Marital status:      Spouse name: None    Number of children: None    Years of education: None    Highest education level: None   Occupational History    None   Tobacco Use    Smoking status: Current Some Day Smoker     Packs/day: 0.10     Years: 39.00     Pack years: 3.90     Types: Cigarettes     Last attempt to quit: 2017     Years since quittin.2    Smokeless tobacco: Never Used    Tobacco comment: a couple times a week   Substance and Sexual Activity    Alcohol use: No     Alcohol/week: 0.0 standard drinks     Comment: very little    Drug use: No    Sexual activity: None   Other Topics Concern    None   Social History Narrative    None     Social Determinants of Health     Financial Resource Strain:     Difficulty of Paying Living Expenses:    Food Insecurity:     Worried About Running Out of Food in the Last Year:     Ran Out of Food in the Last Year:    Transportation Needs:     Lack of Transportation (Medical):      Lack of Transportation (Non-Medical):    Physical Activity:     Days of Exercise per Week:     Minutes of Exercise per Session:    Stress:     Feeling of Stress :    Social Connections:     Frequency of Communication with Friends and Family:     Frequency of Social Gatherings with Friends and Family:     Attends Jew Services:     Active Member of Clubs or Organizations:     Attends Club or Organization Meetings:     Marital Status:    Intimate Partner Violence:     Fear of Current or Ex-Partner:     Emotionally Abused:     Physically Abused:     Sexually Abused:        SCREENINGS    [place] Coma Scale  Eye Opening: Spontaneous  Best Verbal Response: Oriented  Best Motor Response: Obeys commands  [place] Coma Scale Score: 15        PHYSICAL EXAM    (up to 7 for level 4, 8 or more for level 5)     ED Triage Vitals [05/17/21 1611]   BP Temp Temp src Pulse Resp SpO2 Height Weight   129/65 98.6 °F (37 °C) -- 91 16 99 % -- 171 lb (77.6 kg)       Physical Exam  Physical Exam   Constitutional:  Appears well, well-developed and well-nourished. No distress noted. Non toxic in appearance. HENT:     Head: Normocephalic and atraumatic. Right Ear: External ear normal. TM normal pearly light reflex. LeftEar: External ear normal. TM normal pearly light reflex. Nose: Nose normal.     Mouth/Throat: Oropharynx is clear and mucosa moist. No oropharyngeal exudate noted. Posterior pharynx is pink and noninjected. Eyes: Conjunctivae and EOM are normal. Pupils are equal, round, and reactive to light. No scleral icterus. There is no tearing or drainage. Examination of the fundi show crisp optic cups without any is evidence of papilledema. Disc to cup ratio is normal.  Neck: Normal range of motion. Neck supple. No tracheal deviation present. Cardiovascular: Normal rate, regular rhythm, normal heartsounds and intact distal pulses. Exam reveals no gallop or friction rub. No murmur heard. Pulmonary/Chest: Effort normal and breath sounds are symmetric and normal. No respiratory distress. There are no wheezes, rales or rhonchi. No tenderness is exhibited upon palpation of the chest wall. Abdominal: Soft.  Bowel sounds are normal. No distension or no mass exhibitted. There is no tenderness, rebound, rigidity or guarding. Genitourinary:   No CVA tenderness noted on examination. Musculoskeletal: Normal range of motion. No edema, tenderness or deformity. Lymphadenopathy:  No cervical adenopathy. Neurological:   alert and oriented to person, place, and time. Normal speech, normal comprehension, normal cognition,  Reflexes are normal.  There are no cranial nerve deficits. Normal muscle tone, motor and sensory function including SILT exhibited. Coordination normal and gait normal. Strength 5/5 in all extremities and torso. Normal finger to nose testing. Skin: Skin is warm and dry. No rash noted. No diaphoresis. No erythema. No pallor. Psychiatric: Pleasant and cooperative. Normal mood and affect. Behavior is  normal. Judgment and thought content normal.     DIAGNOSTIC RESULTS     EKG: All EKG's are interpreted by the Emergency Department Physician who either signs or Co-signs this chart in the absence of a cardiologist.    Not indicated. RADIOLOGY:   Non-plain film images such as CT, Ultrasoundand MRI are read by the radiologist. Plain radiographic images are visualized and preliminarily interpreted by the emergency physician with the below findings:    Not indicated. Interpretation per the Radiologist below, if available at the time of this note:    XR CHEST PORTABLE   Final Result      Mild emphysematous change without failure, pneumonia or effusion.                         ED BEDSIDE ULTRASOUND:   Performed by ED Physician - none    LABS:  Labs Reviewed   CBC WITH AUTO DIFFERENTIAL - Abnormal; Notable for the following components:       Result Value    RBC 4.21 (*)     Hemoglobin 12.0 (*)     Hematocrit 36.5 (*)     Segs Absolute 6.60 (*)     All other components within normal limits   COMPREHENSIVE METABOLIC PANEL W/ REFLEX TO MG FOR LOW K - Abnormal; Notable for the following components:    Glucose 108 (*)     CO2 37 (*) Anion Gap 6 (*)     Total Protein 6.3 (*)     All other components within normal limits   BRAIN NATRIURETIC PEPTIDE - Abnormal; Notable for the following components:    Pro- (*)     All other components within normal limits   TROPONIN   PROTIME-INR       All other labs were within normal range or not returned as of this dictation. EMERGENCY DEPARTMENT COURSE and DIFFERENTIAL DIAGNOSIS/MDM:   Vitals:    Vitals:    05/17/21 1611   BP: 129/65   Pulse: 91   Resp: 16   Temp: 98.6 °F (37 °C)   SpO2: 99%   Weight: 171 lb (77.6 kg)       Noted    MDM    CRITICAL CARE TIME   Total Critical Care time was 0 minutes, excluding separately reportable procedures. This included evaluation of data, bedside patient evaluation and care, contact and communication with other providers and hospitals and contact and communication with family. There was a high probability of clinically significant/life threatening deterioration in the patient's condition which required my urgent intervention. [facility]  ED Course as of May 17 1710   Mon May 17, 2021   1702 Dr. Jana Rodriguez     [MS]      ED Course User Index  [MS] Ya Segal MD       CONSULTS:  None    PROCEDURES:  Unless otherwise noted below, none     Procedures      Summation    Yun Herzog is a 65 y.o. male who has a history of     was transferred to   for higher level of care due to patient request continuity of care. She was offered admission to [facility] [place] but declined. HEART Score 5 (with 20.3% MACE over next 6 wks = Obs admit)      She is otherwise well, well hydrated, nontoxic, hemodynamically stable, neurologically intact, and satisfactory for discharge for outpatient management. Findings discussed at length with patient and family. I gave them ample opportunity to ask questions for which they did not have any.     The patient was evaluated during the global COVID-19  pandemic, and that diagnosis was suspected/considered upon their initial presentation. Their evaluation, treatment and testing was consistent with current guidelines for patients who present with complaints or symptoms that may be related to COVID-19 . The patient did not meet criteria for COVID-19 testing per current protocol. We recommend COVID-19 testing as per current recommendations if symptoms worsen including fever and difficulty breathing and any other concerns or indications should arise. I advised the patient that the x-ray interpretation is preliminary and final interpretation will be provided by the radiologist at a later time. If there is a discrepancy, the patient will be contacted with further instructions. I instructed the patient to followup with the PCP for evaluation of response to management of acute injury illness infection problem, and with the PCP for management of hypertension and tobacco abuse. I instructed the patient to return to the ER if his condition worsens, if there is any concern for altered mental status, difficulty breathing, dehydration or loss of function. ED Medicationsadministered this visit:  Medications - No data to display    New Prescriptions from this visit:    New Prescriptions    No medications on file       Follow-up:  No follow-up provider specified. Final Impression: No diagnosis found. (Please note that portions of this note werecompleted with a voice recognition program.  Efforts were made to edit the dictations but occasionally words are mis-transcribed.)    FINAL IMPRESSION    No diagnosis found. DISPOSITION/PLAN   DISPOSITION        PATIENT REFERRED TO:  No follow-up provider specified.     DISCHARGE MEDICATIONS:  New Prescriptions    No medications on file          (Please note that portions of this note were completed with a voice recognition program.  Efforts were made to edit the dictations but occasionally words are mis-transcribed.)    Mando Tong, MD (electronically signed)  Attending Emergency Physician

## 2021-05-17 NOTE — ED PROVIDER NOTES
[facility] ED  EMERGENCY DEPARTMENT ENCOUNTER      Pt Name: Maura Amaro  MRN: [id number]  [place] 1950  Date of evaluation: 5/17/2021  Provider: Stewart Rhodes MD    [address] [place] [address]       Chief Complaint   Patient presents with    Shortness of Breath     more short of breath the last few days. Wears 3L o2 at home    Irregular Heart Beat     hx of afib    Leg Swelling     BLE swelling. Worse than normal.          HISTORY OF PRESENT ILLNESS   (Location/Symptom, Timing/Onset, Context/Setting, Quality, Duration, Modifying Factors, Severity)  Note limiting factors. Maura Amaro is a 65 y.o. male who has a history of paroxysmal atrial fibrillation, COPD, hypertension, tobacco abuse, aneurysm of suprarenal aorta, presents to the emergency department for evaluation and management of shortness of breath that worsened the last few days. He states that his heart rate is erratic. He wears 3 L of oxygen at home. Leg swelling bilaterally. He has not tried anything for symptoms but is compliant with his medications. he has not seen any other providers for this. He was being treated with short acting diltiazem for atrial fibrillation by Dr. Chalo Lam who later changed him to long-acting 120 mg. He is a vet at the [facility] but may be admitted elsewhere. This patient is being evaluated during the COVID-19 pandemic. HPI    Nursing Notes were reviewed. REVIEW OF SYSTEMS    (2-9 systems for level 4, 10 or more for level 5)       REVIEW OF SYSTEMS    Constitutional: Negative for fatigue and fever. Respiratory: Positive for increasing shortness of breath, negative for cough, chest tightness  Cardiovascular: Positive for palpitations, leg swelling, negative for chest pain   Gastrointestinal: Negative for abdominal distention, abdominal pain, diarrhea, nausea and vomiting. Neurological: Negative for dizziness, speech difficulty, weakness, distal tingling/numbness and headaches. Fear of Current or Ex-Partner:     Emotionally Abused:     Physically Abused:     Sexually Abused:        SCREENINGS    Laureano Coma Scale  Eye Opening: Spontaneous  Best Verbal Response: Oriented  Best Motor Response: Obeys commands  Laureano Coma Scale Score: 15        PHYSICAL EXAM    (up to 7 for level 4, 8 or more for level 5)     ED Triage Vitals [05/17/21 1611]   BP Temp Temp src Pulse Resp SpO2 Height Weight   129/65 98.6 °F (37 °C) -- 91 16 99 % -- 171 lb (77.6 kg)       Physical Exam  Physical Exam   Constitutional:  Appears well, well-developed and well-nourished. No distress noted. Non toxic in appearance. HENT:     Head: Normocephalic and atraumatic. Mouth/Throat: Oropharynx is clear and mucosa moist. No oropharyngeal exudate noted. Posterior pharynx is pink and noninjected. Eyes: Conjunctivae and EOM are normal. Pupils are equal, round, and reactive to light. No scleral icterus. There is no tearing or drainage. Neck: Normal range of motion. Neck supple. No tracheal deviation present. Cardiovascular: Normal rate, irregular rhythm, normal heartsounds and intact distal pulses. Exam reveals no gallop or friction rub. No murmur heard. Pulmonary/Chest: Effort normal and breath sounds are symmetric and normal. No respiratory distress. There are no wheezes, rales or rhonchi. Abdominal: Soft. Bowel sounds are normal. No distension or no mass exhibitted. There is no tenderness, rebound, rigidity or guarding. Genitourinary:   No CVA tenderness noted on examination. Musculoskeletal: Normal range of motion. No edema, tenderness or deformity. Lymphadenopathy:  No cervical adenopathy. Neurological:   alert and oriented to person, place, and time. Normal speech, normal comprehension, normal cognition,  Reflexes are normal.  There are no cranial nerve deficits. Normal muscle tone, motor and sensory function including SILT exhibited.   Coordination normal and gait normal. Strength 5/5 in all extremities and torso. Skin: Skin is warm and dry. No rash noted. No diaphoresis. No erythema. No pallor. Psychiatric: Pleasant and cooperative. Normal mood and affect. Behavior is  normal. Judgment and thought content normal.     DIAGNOSTIC RESULTS     EKG: All EKG's are interpreted by the Emergency Department Physician who either signs or Co-signs this chart in the absence of a cardiologist.    A twelve-lead EKG was performed at 1636. There is atrial fibrillation with PVCs. Ventricular rate of 6383 bpm.  There is a normal QRS duration, QT, QTC and axis. No acute ST segment or T wave changes are identified. RADIOLOGY:   Non-plain film images such as CT, Ultrasoundand MRI are read by the radiologist. Plain radiographic images are visualized and preliminarily interpreted by the emergency physician with the below findings:    Not indicated. Interpretation per the Radiologist below, if available at the time of this note:    CTA CHEST W CONTRAST   Final Result      Emphysematous changes without pulmonary embolus or pneumonia. Small saccular aneurysm near the left renal artery, stable. XR CHEST PORTABLE   Final Result      Mild emphysematous change without failure, pneumonia or effusion.                         ED BEDSIDE ULTRASOUND:   Performed by ED Physician - none    LABS:  Labs Reviewed   CBC WITH AUTO DIFFERENTIAL - Abnormal; Notable for the following components:       Result Value    RBC 4.21 (*)     Hemoglobin 12.0 (*)     Hematocrit 36.5 (*)     Segs Absolute 6.60 (*)     All other components within normal limits   COMPREHENSIVE METABOLIC PANEL W/ REFLEX TO MG FOR LOW K - Abnormal; Notable for the following components:    Glucose 108 (*)     CO2 37 (*)     Anion Gap 6 (*)     Total Protein 6.3 (*)     All other components within normal limits   BRAIN NATRIURETIC PEPTIDE - Abnormal; Notable for the following components:    Pro- (*)     All other components within normal also recommended EKG as an outpatient. I told him that the patient was still in atrial fibrillation without conversion to sinus rhythm. [MS]      ED Course User Index  [MS] Carla Duarte MD       CONSULTS:  None    PROCEDURES:  Unless otherwise noted below, none     Procedures      Summation    Sue Farr is a 65 y.o. male who has a history of paroxysmal atrial fibrillation, COPD, hypertension, tobacco abuse, aneurysm of suprarenal aorta, presented with atrial fibrillation without RVR, increasing shortness of breath, leg edema. He has emphysematous changes on both plain films and CTA without any evidence of pneumonia, failure, edema, PE. He appeared to try to convert to sinus rhythm at one point during examination but a trial of amiodarone failed to convert him to sinus rhythm. Antibiotics not indicated at this time. He was discharged home with the plan for outpatient EKG but consultation with his cardiologist.    He is otherwise well, well hydrated, nontoxic, hemodynamically stable, neurologically intact, and satisfactory for discharge for outpatient management. Findings discussed at length with patient and family. I gave them ample opportunity to ask questions. .    The patient was evaluated during the global COVID-19  pandemic, and that diagnosis was suspected/considered upon their initial presentation. Their evaluation, treatment and testing was consistent with current guidelines for patients who present with complaints or symptoms that may be related to COVID-19 . The patient met criteria for COVID-19 testing per current protocol. COVID negative. I instructed the patient to followup with Dr. Parker Hodges for evaluation of response to management of acute problem, and with his PCP for management of tobacco abuse. I instructed the patient to return to the ER if his condition worsens, if there is any concern for altered mental status, difficulty breathing, dehydration or loss of function. ED Medicationsadministered this visit:    Medications   iopamidol (ISOVUE-370) 76 % injection 100 mL (100 mLs Intravenous Given 5/17/21 1840)   amiodarone (CORDARONE) injection 150 mg (150 mg Intravenous Given 5/17/21 2005)       New Prescriptions from this visit:    Discharge Medication List as of 5/17/2021  8:46 PM          Follow-up:  [facility]  [address] [place] [id number]  [phone]  Go to   As needed, If symptoms worsen      EKG in the morning per instructions. Schedule an appointment as soon as possible for a visit in 3 days      Rozanna Dakin, MD  [address] [place] [address] [address]              Final Impression:   1. Atrial fibrillation, unspecified type (HCC)    2. Leg edema    3. Shortness of breath    4. Elevated d-dimer    5. Tobacco abuse               (Please note that portions of this note werecompleted with a voice recognition program.  Efforts were made to edit the dictations but occasionally words are mis-transcribed.)    FINAL IMPRESSION      1. Atrial fibrillation, unspecified type (HCC)    2. Leg edema    3. Shortness of breath    4. Elevated d-dimer    5. Tobacco abuse          DISPOSITION/PLAN   DISPOSITION Decision To Discharge 05/17/2021 08:34:24 PM      PATIENT REFERRED TO:  [facility]  [address] [facility] [address]  [phone]  Go to   As needed, If symptoms worsen      EKG in the morning per instructions.   Schedule an appointment as soon as possible for a visit in 3 days      Rozanna Dakin, MD  [address] [place] [address] [address]            DISCHARGE MEDICATIONS:  Discharge Medication List as of 5/17/2021  8:46 PM             (Please note that portions of this note were completed with a voice recognition program.  Efforts were made to edit the dictations but occasionally words are mis-transcribed.)    Valorie Arroyo MD (electronically signed)  Attending Emergency Physician           Kelly Smallwood

## 2021-05-18 ENCOUNTER — HOSPITAL ENCOUNTER (OUTPATIENT)
Age: 71
Discharge: HOME | End: 2021-05-18
Payer: MEDICARE

## 2021-05-18 LAB
EKG ATRIAL RATE: 258 BPM
EKG Q-T INTERVAL: 372 MS
EKG QRS DURATION: 86 MS
EKG QTC CALCULATION (BAZETT): 437 MS
EKG R AXIS: 83 DEGREES
EKG T AXIS: 102 DEGREES
EKG VENTRICULAR RATE: 83 BPM

## 2021-05-18 PROCEDURE — 93005 ELECTROCARDIOGRAM TRACING: CPT | Performed by: INTERNAL MEDICINE

## 2021-05-18 PROCEDURE — 93010 ELECTROCARDIOGRAM REPORT: CPT | Performed by: INTERNAL MEDICINE

## 2021-05-20 LAB
EKG ATRIAL RATE: 67 BPM
EKG P AXIS: 62 DEGREES
EKG P-R INTERVAL: 136 MS
EKG Q-T INTERVAL: 382 MS
EKG QRS DURATION: 78 MS
EKG QTC CALCULATION (BAZETT): 403 MS
EKG R AXIS: 82 DEGREES
EKG T AXIS: 78 DEGREES
EKG VENTRICULAR RATE: 67 BPM

## 2021-05-20 PROCEDURE — 93010 ELECTROCARDIOGRAM REPORT: CPT | Performed by: INTERNAL MEDICINE

## 2021-07-11 ENCOUNTER — APPOINTMENT (OUTPATIENT)
Dept: GENERAL RADIOLOGY | Age: 71
End: 2021-07-11
Payer: MEDICARE

## 2021-07-11 ENCOUNTER — HOSPITAL ENCOUNTER (EMERGENCY)
Age: 71
Discharge: HOME | End: 2021-07-11
Attending: EMERGENCY MEDICINE
Payer: MEDICARE

## 2021-07-11 VITALS
BODY MASS INDEX: 23.77 KG/M2 | TEMPERATURE: 98.6 F | HEART RATE: 63 BPM | OXYGEN SATURATION: 97 % | HEIGHT: 70 IN | DIASTOLIC BLOOD PRESSURE: 60 MMHG | RESPIRATION RATE: 23 BRPM | SYSTOLIC BLOOD PRESSURE: 103 MMHG | WEIGHT: 166 LBS

## 2021-07-11 DIAGNOSIS — I48.91 ATRIAL FIBRILLATION WITH RVR (HCC): Primary | ICD-10-CM

## 2021-07-11 LAB
ABSOLUTE EOS #: 0.1 K/UL (ref 0–0.4)
ABSOLUTE IMMATURE GRANULOCYTE: ABNORMAL K/UL (ref 0–0.3)
ABSOLUTE LYMPH #: 1.6 K/UL (ref 1–4.8)
ABSOLUTE MONO #: 0.9 K/UL (ref 0–1)
ALBUMIN SERPL-MCNC: 3.7 G/DL (ref 3.5–5.2)
ALBUMIN/GLOBULIN RATIO: ABNORMAL (ref 1–2.5)
ALLEN TEST: POSITIVE
ALP BLD-CCNC: 69 U/L (ref 40–129)
ALT SERPL-CCNC: 19 U/L (ref 5–41)
ANION GAP SERPL CALCULATED.3IONS-SCNC: 8 MMOL/L (ref 9–17)
AST SERPL-CCNC: 16 U/L
BASOPHILS # BLD: 1 % (ref 0–2)
BASOPHILS ABSOLUTE: 0 K/UL (ref 0–0.2)
BILIRUB SERPL-MCNC: 0.3 MG/DL (ref 0.3–1.2)
BUN BLDV-MCNC: 15 MG/DL (ref 8–23)
BUN/CREAT BLD: 20 (ref 9–20)
CALCIUM SERPL-MCNC: 9.4 MG/DL (ref 8.6–10.4)
CHLORIDE BLD-SCNC: 101 MMOL/L (ref 98–107)
CO2: 34 MMOL/L (ref 20–31)
CREAT SERPL-MCNC: 0.76 MG/DL (ref 0.7–1.2)
DIFFERENTIAL TYPE: YES
EOSINOPHILS RELATIVE PERCENT: 1 % (ref 0–5)
FIO2: 21
GFR AFRICAN AMERICAN: >60 ML/MIN
GFR NON-AFRICAN AMERICAN: >60 ML/MIN
GFR SERPL CREATININE-BSD FRML MDRD: ABNORMAL ML/MIN/{1.73_M2}
GFR SERPL CREATININE-BSD FRML MDRD: ABNORMAL ML/MIN/{1.73_M2}
GLUCOSE BLD-MCNC: 151 MG/DL (ref 70–99)
HCT VFR BLD CALC: 37.2 % (ref 41–53)
HEMOGLOBIN: 12.3 G/DL (ref 13.5–17.5)
IMMATURE GRANULOCYTES: ABNORMAL %
INR BLD: 1.1
LYMPHOCYTES # BLD: 16 % (ref 13–44)
MAGNESIUM: 1.9 MG/DL (ref 1.6–2.6)
MCH RBC QN AUTO: 28.5 PG (ref 26–34)
MCHC RBC AUTO-ENTMCNC: 33 G/DL (ref 31–37)
MCV RBC AUTO: 86.3 FL (ref 80–100)
MODE: ABNORMAL
MONOCYTES # BLD: 9 % (ref 5–9)
NEGATIVE BASE EXCESS, ART: ABNORMAL (ref 0–2)
NRBC AUTOMATED: ABNORMAL PER 100 WBC
O2 DEVICE/FLOW/%: ABNORMAL
PARTIAL THROMBOPLASTIN TIME: 35.5 SEC (ref 23.9–33.8)
PATIENT TEMP: ABNORMAL
PDW BLD-RTO: 15.1 % (ref 12.1–15.2)
PLATELET # BLD: 193 K/UL (ref 140–450)
PLATELET ESTIMATE: ABNORMAL
PMV BLD AUTO: ABNORMAL FL (ref 6–12)
POC HCO3: 31.1 MMOL/L (ref 21–28)
POC O2 SATURATION: 97 % (ref 94–98)
POC PCO2 TEMP: ABNORMAL MM HG
POC PCO2: 49.3 MM HG (ref 35–48)
POC PH TEMP: ABNORMAL
POC PH: 7.41 (ref 7.35–7.45)
POC PO2 TEMP: ABNORMAL MM HG
POC PO2: 93.5 MM HG (ref 83–108)
POSITIVE BASE EXCESS, ART: 5 (ref 0–3)
POTASSIUM SERPL-SCNC: 3.9 MMOL/L (ref 3.7–5.3)
PROTHROMBIN TIME: 13.4 SEC (ref 11.5–14.2)
RBC # BLD: 4.31 M/UL (ref 4.5–5.9)
RBC # BLD: ABNORMAL 10*6/UL
SAMPLE SITE: ABNORMAL
SEG NEUTROPHILS: 73 % (ref 39–75)
SEGMENTED NEUTROPHILS ABSOLUTE COUNT: 7.3 K/UL (ref 2.1–6.5)
SODIUM BLD-SCNC: 143 MMOL/L (ref 135–144)
TCO2 (CALC), ART: ABNORMAL MMOL/L (ref 22–29)
TOTAL PROTEIN: 6.8 G/DL (ref 6.4–8.3)
TROPONIN INTERP: NORMAL
TROPONIN INTERP: NORMAL
TROPONIN T: NORMAL NG/ML
TROPONIN T: NORMAL NG/ML
TROPONIN, HIGH SENSITIVITY: 11 NG/L (ref 0–22)
TROPONIN, HIGH SENSITIVITY: 11 NG/L (ref 0–22)
WBC # BLD: 10 K/UL (ref 3.5–11)
WBC # BLD: ABNORMAL 10*3/UL

## 2021-07-11 PROCEDURE — 84484 ASSAY OF TROPONIN QUANT: CPT

## 2021-07-11 PROCEDURE — 85025 COMPLETE CBC W/AUTO DIFF WBC: CPT

## 2021-07-11 PROCEDURE — 80053 COMPREHEN METABOLIC PANEL: CPT

## 2021-07-11 PROCEDURE — 6370000000 HC RX 637 (ALT 250 FOR IP): Performed by: EMERGENCY MEDICINE

## 2021-07-11 PROCEDURE — 83735 ASSAY OF MAGNESIUM: CPT

## 2021-07-11 PROCEDURE — 82803 BLOOD GASES ANY COMBINATION: CPT

## 2021-07-11 PROCEDURE — 96374 THER/PROPH/DIAG INJ IV PUSH: CPT

## 2021-07-11 PROCEDURE — 94640 AIRWAY INHALATION TREATMENT: CPT

## 2021-07-11 PROCEDURE — 93005 ELECTROCARDIOGRAM TRACING: CPT | Performed by: EMERGENCY MEDICINE

## 2021-07-11 PROCEDURE — 99285 EMERGENCY DEPT VISIT HI MDM: CPT

## 2021-07-11 PROCEDURE — 85610 PROTHROMBIN TIME: CPT

## 2021-07-11 PROCEDURE — 71045 X-RAY EXAM CHEST 1 VIEW: CPT

## 2021-07-11 PROCEDURE — 85730 THROMBOPLASTIN TIME PARTIAL: CPT

## 2021-07-11 PROCEDURE — 2500000003 HC RX 250 WO HCPCS: Performed by: EMERGENCY MEDICINE

## 2021-07-11 PROCEDURE — 36415 COLL VENOUS BLD VENIPUNCTURE: CPT

## 2021-07-11 PROCEDURE — 36600 WITHDRAWAL OF ARTERIAL BLOOD: CPT

## 2021-07-11 RX ORDER — DILTIAZEM HYDROCHLORIDE 5 MG/ML
15 INJECTION INTRAVENOUS ONCE
Status: COMPLETED | OUTPATIENT
Start: 2021-07-11 | End: 2021-07-11

## 2021-07-11 RX ORDER — IPRATROPIUM BROMIDE AND ALBUTEROL SULFATE 2.5; .5 MG/3ML; MG/3ML
1 SOLUTION RESPIRATORY (INHALATION) ONCE
Status: COMPLETED | OUTPATIENT
Start: 2021-07-11 | End: 2021-07-11

## 2021-07-11 RX ORDER — ASPIRIN 81 MG/1
81 TABLET ORAL DAILY
COMMUNITY
End: 2022-02-21

## 2021-07-11 RX ADMIN — IPRATROPIUM BROMIDE AND ALBUTEROL SULFATE 1 AMPULE: .5; 3 SOLUTION RESPIRATORY (INHALATION) at 14:42

## 2021-07-11 RX ADMIN — DILTIAZEM HYDROCHLORIDE 15 MG: 5 INJECTION INTRAVENOUS at 13:01

## 2021-07-11 ASSESSMENT — PAIN DESCRIPTION - LOCATION: LOCATION: CHEST

## 2021-07-11 ASSESSMENT — ENCOUNTER SYMPTOMS
WHEEZING: 1
VOMITING: 0
SHORTNESS OF BREATH: 1
ABDOMINAL PAIN: 0
BACK PAIN: 0
COLOR CHANGE: 0
NAUSEA: 0
SORE THROAT: 0
EYE PAIN: 0
DIARRHEA: 0
TROUBLE SWALLOWING: 0
EYE REDNESS: 0
COUGH: 1

## 2021-07-11 ASSESSMENT — PAIN DESCRIPTION - DESCRIPTORS: DESCRIPTORS: CONSTANT;PRESSURE

## 2021-07-11 ASSESSMENT — PAIN DESCRIPTION - PAIN TYPE: TYPE: ACUTE PAIN

## 2021-07-11 ASSESSMENT — PAIN SCALES - GENERAL: PAINLEVEL_OUTOF10: 3

## 2021-07-11 ASSESSMENT — PAIN DESCRIPTION - ORIENTATION: ORIENTATION: MID;UPPER

## 2021-07-11 ASSESSMENT — PAIN DESCRIPTION - PROGRESSION: CLINICAL_PROGRESSION: GRADUALLY WORSENING

## 2021-07-11 ASSESSMENT — PAIN DESCRIPTION - ONSET: ONSET: ON-GOING

## 2021-07-11 ASSESSMENT — PAIN DESCRIPTION - FREQUENCY: FREQUENCY: CONTINUOUS

## 2021-07-11 NOTE — ED PROVIDER NOTES
[facility] ED  eMERGENCY dEPARTMENT eNCOUnter      Pt Name: Doron Lyons  MRN: [id number]  [place] 1950  Date of evaluation: 7/11/2021  Provider: Malik Loja MD    CHIEF COMPLAINT       Chief Complaint   Patient presents with    Shortness of Breath     pt started yesterday evening with increased sob and chest pressure     Patient is a 68-year-old male who presents to the emergency department complaining of shortness of breath. Patient states that he has a history of COPD and atrial fibrillation and noted that he went into atrial fibrillation yesterday. He is also been a little more short of breath than normal.  He denies any chest pain. He denies any fever or chills. He states that he does take Cardizem for his atrial fibrillation but does not take a blood thinner. He states he does have a history of a aneurysm. He denies any nausea or vomiting. Nursing Notes were reviewed. REVIEW OF SYSTEMS    (2-9 systems for level 4, 10 or more for level 5)     Review of Systems   Constitutional: Negative for chills and fever. HENT: Negative for ear pain, sore throat and trouble swallowing. Eyes: Negative for pain and redness. Respiratory: Positive for cough, shortness of breath and wheezing. Cardiovascular: Positive for palpitations. Negative for chest pain. Gastrointestinal: Negative for abdominal pain, diarrhea, nausea and vomiting. Genitourinary: Negative for dysuria and frequency. Musculoskeletal: Negative for back pain and neck pain. Skin: Negative for color change and rash. Neurological: Negative for dizziness, syncope and headaches. Psychiatric/Behavioral: Negative for hallucinations and suicidal ideas. Except as noted above the remainder of the review of systems was reviewed and negative.        PAST MEDICAL HISTORY     Past Medical History:   Diagnosis Date    Abdominal aneurysm [facility])     x2    [facility] [address]     Atrial fibrillation ([facility]     Chicken pox     Chronic back pain     COPD (chronic obstructive pulmonary disease) (HCC)     Emphysema of lung (HCC)     [facility]     Hyperlipidemia     Hypertension     Osteoarthritis          SURGICAL HISTORY       Past Surgical History:   Procedure Laterality Date    CARDIAC CATHETERIZATION Left 08/05/15    Dr. Nita Perez 30% disease of the left main trunck, Mild plaque disease in the coronary arteries. Normal LV function, ejection fraction of 60%    EYE SURGERY           ALLERGIES     Codeine and Atorvastatin    FAMILY HISTORY       Family History   Problem Relation Age of Onset    Cancer Mother         Pancreatic cancer    Cancer Father         Lung    Cancer Brother         Brain cancer, pancreatic cancer, colon cancer          SOCIAL HISTORY       Social History     Socioeconomic History    Marital status:      Spouse name: None    Number of children: None    Years of education: None    Highest education level: None   Occupational History    None   Tobacco Use    Smoking status: Current Some Day Smoker     Packs/day: 0.10     Years: 39.00     Pack years: 3.90     Types: Cigarettes     Last attempt to quit: 2017     Years since quittin.3    Smokeless tobacco: Never Used    Tobacco comment: a couple times a week   Substance and Sexual Activity    Alcohol use: No     Alcohol/week: 0.0 standard drinks     Comment: very little    Drug use: No    Sexual activity: None   Other Topics Concern    None   Social History Narrative    None     Social Determinants of Health     Financial Resource Strain:     Difficulty of Paying Living Expenses:    Food Insecurity:     Worried About Running Out of Food in the Last Year:     Ran Out of Food in the Last Year:    Transportation Needs:     Lack of Transportation (Medical):      Lack of Transportation (Non-Medical):    Physical Activity:     Days of Exercise per Week:     Minutes of Exercise per Session:    Stress:     Feeling of Stress :    Social Connections:     Frequency of Communication with Friends and Family:     Frequency of Social Gatherings with Friends and Family:     Attends Orthodoxy Services:     Active Member of Clubs or Organizations:     Attends Club or Organization Meetings:     Marital Status:    Intimate Partner Violence:     Fear of Current or Ex-Partner:     Emotionally Abused:     Physically Abused:     Sexually Abused:            PHYSICAL EXAM    (up to 7 for level 4, 8 ormore for level 5)     ED Triage Vitals [07/11/21 1236]   BP Temp Temp Source Pulse Resp SpO2 Height Weight   130/80 98.6 °F (37 °C) Oral 158 20 95 % 5' 10\" (1.778 m) 166 lb (75.3 kg)       Physical Exam     Physical    Vital signs and nursing notes were reviewed as well as the social, family, and past medical history. Gen. appearance: Patient is alert and oriented and in no acute distress    Head: Atraumatic, normocephalic    Neck: Supple, trachea/thyroid normal    EENT: PERRLA, EOMI, conjunctiva normal.    Skin: Warm and dry with no rash    Cardiovascular: Heart tachycardic and irregular, no gallops or rubs, no aortic enlargement or bruits noted. Respiratory: Lungs diminished with wheezing heard, no rales    Gastrointestinal: Abdomen nontender, bowel sounds normal, no rebound/guarding/distention or mass    Musculoskeletal: No tenderness in the extremities, no back or hip pain.     Neurological: Patient is alert and oriented ×3, no focal motor or sensory deficits noted, reflexes normal, NIH = 0      DIAGNOSTIC RESULTS         RADIOLOGY:         LABS:  Labs Reviewed   CBC WITH AUTO DIFFERENTIAL - Abnormal; Notable for the following components:       Result Value    RBC 4.31 (*)     Hemoglobin 12.3 (*)     Hematocrit 37.2 (*)     Segs Absolute 7.30 (*)     All other components within normal limits   COMPREHENSIVE METABOLIC PANEL - Abnormal; Notable for the following components:    Glucose 151 (*) CO2 34 (*)     Anion Gap 8 (*)     All other components within normal limits   APTT - Abnormal; Notable for the following components:    PTT 35.5 (*)     All other components within normal limits   ARTERIAL BLOOD GAS, POC - Abnormal; Notable for the following components:    POC pCO2 49.3 (*)     POC HCO3 31.1 (*)     Positive Base Excess, Art 5 (*)     All other components within normal limits   MAGNESIUM   PROTIME-INR   TROPONIN   TROPONIN       All other labs were within normal range or not returned as of this dictation. EMERGENCY DEPARTMENT COURSE and DIFFERENTIAL DIAGNOSIS/MDM:   Vitals:    Vitals:    07/11/21 1505 07/11/21 1520 07/11/21 1535 07/11/21 1550   BP: 122/69 (!) 122/57 (!) 119/53 117/61   Pulse: 70 70 69 68   Resp: 23 24 24 24   Temp:       TempSrc:       SpO2: 95% 95% 96% 95%   Weight:       Height:                     REASSESSMENT     ED Course as of Jul 11 1608   Sun Jul 11, 2021   1308 Initial EKG:    Atrial fibrillation with a rate of 158 and no acute ST elevation or ischemic change. [JM]   1349 Repeat EKG    Normal sinus rhythm with PACs and a rate of 74. [JM]      ED Course User Index  [JM] Baudilio Red MD         In the ED patient converted to normal sinus rhythm after Cardizem bolus. Patient was not started on a Cardizem drip and has felt fine since. Patient has no shortness of breath at this time. Patient states he thinks this all started when his oxygen tank ran low when he was outside. He denies any other symptoms right now and is comfortable going home. Patient had 2 high-sensitivity troponins that were both negative. I discussed with the patient and advised him that as he is now in order normal sinus rhythm without any symptoms and normal cardiac work-up I believe we can discharge him to home advised him to return if he develops a rapid heart rate again.   Patient knows that he is at high risk as he is not taking blood thinners with his paroxysmal A. fib and we talked about this and he will talk about it again with his cardiologist and primary care doctor. Patient will be discharged home. Due to the immediate potential for life-threatening deterioration due to A. fib with RVR, I spent 30 minutes providing critical care. This time is excluding time spent performing procedures. PROCEDURES:  Unless otherwise noted below, none     Procedures    FINAL IMPRESSION      1.  Atrial fibrillation with RVR [facility])          DISPOSITION/PLAN   DISPOSITION Decision To Discharge 07/11/2021 04:06:15 PM      PATIENT REFERRED TO:  Maki Acosta DO  [address] [facility] [id number]    In 3 days        DISCHARGE MEDICATIONS:  New Prescriptions    No medications on file          (Please note that portions ofthis note were completed with a voice recognition program.  Efforts were made to edit the dictations but occasionally words are mis-transcribed.)    Jean-Pierre Shelton MD(electronically signed)  Attending Emergency Physician            Jean-Pierre Shelton MD  07/11/21 8230

## 2021-07-11 NOTE — ED NOTES
Heart rate in 60's post IV push Cardizem.  Clarification with ED doctor will hold IV continuous infusion and continue to monitor     Parrish Ruggiero RN  07/11/21 6811

## 2021-07-13 ENCOUNTER — TELEPHONE (OUTPATIENT)
Dept: CARDIOLOGY CLINIC | Age: 71
End: 2021-07-13

## 2021-07-13 LAB
EKG ATRIAL RATE: 158 BPM
EKG ATRIAL RATE: 74 BPM
EKG P AXIS: 78 DEGREES
EKG P AXIS: 89 DEGREES
EKG P-R INTERVAL: 124 MS
EKG P-R INTERVAL: 144 MS
EKG Q-T INTERVAL: 238 MS
EKG Q-T INTERVAL: 376 MS
EKG QRS DURATION: 104 MS
EKG QRS DURATION: 82 MS
EKG QTC CALCULATION (BAZETT): 385 MS
EKG QTC CALCULATION (BAZETT): 417 MS
EKG R AXIS: 71 DEGREES
EKG R AXIS: 79 DEGREES
EKG T AXIS: 32 DEGREES
EKG T AXIS: 86 DEGREES
EKG VENTRICULAR RATE: 158 BPM
EKG VENTRICULAR RATE: 74 BPM

## 2021-07-13 PROCEDURE — 93010 ELECTROCARDIOGRAM REPORT: CPT | Performed by: INTERNAL MEDICINE

## 2021-07-13 NOTE — TELEPHONE ENCOUNTER
Lang Sandra called to ask for an appointment for [facility]. She said that he was in the ED on 7/11 with AFIB and he has been going in and out of it a lot lately. She stated that he is more SOB. Lang Flores stated that the patient did not see [facility] concerning anticoagulation.     Please advise

## 2021-07-13 NOTE — TELEPHONE ENCOUNTER
DR reviewed chart   Wife called to take bp and hr for a week   And call with update. If pt still having episode of a fib  Will increase cardiazem to 180 mg daily   And possibly do lexiscan.

## 2021-07-20 ENCOUNTER — TELEPHONE (OUTPATIENT)
Dept: CARDIOLOGY CLINIC | Age: 71
End: 2021-07-20

## 2021-07-20 RX ORDER — DILTIAZEM HYDROCHLORIDE 180 MG/1
180 CAPSULE, COATED, EXTENDED RELEASE ORAL DAILY
Qty: 90 CAPSULE | Refills: 3 | Status: SHIPPED | OUTPATIENT
Start: 2021-07-20 | End: 2021-12-29

## 2021-07-20 NOTE — TELEPHONE ENCOUNTER
Pt wife called with update pt bp hr ok  But still having episodes of a fib.    Per DR [facility] will increase   Diltiazem to 180 mg daily will call in 1 week with update

## 2021-07-26 ENCOUNTER — TELEPHONE (OUTPATIENT)
Dept: CARDIOLOGY CLINIC | Age: 71
End: 2021-07-26

## 2021-07-26 NOTE — TELEPHONE ENCOUNTER
Advised to take diltiazem in evening to see if that helps also [facility] ok to go [facility] Dr[facility] Vicente Abt- Wife verbalized understanding

## 2021-07-26 NOTE — TELEPHONE ENCOUNTER
Yudi called to state that Roland Burkitt has been AFIB almost every evening 9 PM usually for 2 hours sometimes less. Sometimes it will go back into rhythm in an hour or so. He is taking the Diltiazem as directed. Should this be helping by now. She does not want to go to ED because they just send him home. She is worried about taking him to [address] [facility] on August 4th, since he is symptomatic with the AFIB. She just wants to make sure that he will be okay to go that far. She would like a call back.

## 2021-09-05 ENCOUNTER — HOSPITAL ENCOUNTER (EMERGENCY)
Age: 71
Discharge: HOME | End: 2021-09-05
Attending: EMERGENCY MEDICINE
Payer: MEDICARE

## 2021-09-05 ENCOUNTER — APPOINTMENT (OUTPATIENT)
Dept: GENERAL RADIOLOGY | Age: 71
End: 2021-09-05
Payer: MEDICARE

## 2021-09-05 VITALS
HEIGHT: 70 IN | DIASTOLIC BLOOD PRESSURE: 67 MMHG | WEIGHT: 167 LBS | BODY MASS INDEX: 23.91 KG/M2 | SYSTOLIC BLOOD PRESSURE: 136 MMHG | RESPIRATION RATE: 18 BRPM | HEART RATE: 58 BPM | OXYGEN SATURATION: 96 % | TEMPERATURE: 98.6 F

## 2021-09-05 DIAGNOSIS — J44.1 COPD EXACERBATION (HCC): Primary | ICD-10-CM

## 2021-09-05 LAB
SARS-COV-2, RAPID: NOT DETECTED
SPECIMEN DESCRIPTION: NORMAL

## 2021-09-05 PROCEDURE — 87635 SARS-COV-2 COVID-19 AMP PRB: CPT

## 2021-09-05 PROCEDURE — 71045 X-RAY EXAM CHEST 1 VIEW: CPT

## 2021-09-05 PROCEDURE — 94664 DEMO&/EVAL PT USE INHALER: CPT

## 2021-09-05 PROCEDURE — C9803 HOPD COVID-19 SPEC COLLECT: HCPCS

## 2021-09-05 PROCEDURE — 6370000000 HC RX 637 (ALT 250 FOR IP): Performed by: EMERGENCY MEDICINE

## 2021-09-05 PROCEDURE — 99284 EMERGENCY DEPT VISIT MOD MDM: CPT

## 2021-09-05 RX ORDER — PREDNISONE 20 MG/1
60 TABLET ORAL ONCE
Status: DISCONTINUED | OUTPATIENT
Start: 2021-09-05 | End: 2021-09-05 | Stop reason: HOSPADM

## 2021-09-05 RX ORDER — FUROSEMIDE 20 MG/1
20 TABLET ORAL DAILY
COMMUNITY
Start: 2021-08-16

## 2021-09-05 RX ORDER — MAGNESIUM OXIDE 420 MG
420 TABLET ORAL DAILY
COMMUNITY
Start: 2021-08-03 | End: 2021-12-15

## 2021-09-05 RX ORDER — AZITHROMYCIN 250 MG/1
TABLET, FILM COATED ORAL
Qty: 1 PACKET | Refills: 0 | Status: SHIPPED | OUTPATIENT
Start: 2021-09-05 | End: 2021-12-15

## 2021-09-05 RX ORDER — PREDNISONE 20 MG/1
20 TABLET ORAL DAILY
Qty: 7 TABLET | Refills: 0 | Status: SHIPPED | OUTPATIENT
Start: 2021-09-05 | End: 2021-09-12

## 2021-09-05 RX ORDER — IPRATROPIUM BROMIDE AND ALBUTEROL SULFATE 2.5; .5 MG/3ML; MG/3ML
3 SOLUTION RESPIRATORY (INHALATION) ONCE
Status: COMPLETED | OUTPATIENT
Start: 2021-09-05 | End: 2021-09-05

## 2021-09-05 RX ADMIN — IPRATROPIUM BROMIDE AND ALBUTEROL SULFATE 3 AMPULE: .5; 3 SOLUTION RESPIRATORY (INHALATION) at 16:17

## 2021-09-05 ASSESSMENT — PAIN SCALES - GENERAL: PAINLEVEL_OUTOF10: 0

## 2021-09-05 NOTE — ED PROVIDER NOTES
HPI:  9/5/21,   Time: 3:22 PM EDT         Moreno Escobar is a 65 y.o. male presenting to the ED for cough and congestion and increased O2 requirement of 4 L and patient has end-stage COPD, beginning last few days ago. The complaint has been constant, moderate in severity, and worsened by nothing. No significant alleviating factors. No fever or chills or night sweats no chest pain or edema    ROS:   Pertinent positives and negatives are stated within HPI, all other systems reviewed and are negative.  --------------------------------------------- PAST HISTORY ---------------------------------------------  Past Medical History:  has a past medical history of Abdominal aneurysm ([facility] [address] Aortic aneurysm ([address] Atrial fibrillation ([address] Chicken pox, Chronic back pain, COPD (chronic obstructive pulmonary disease) ([address] Emphysema of lung ([address] Emphysema of lung ([address] Hyperlipidemia, Hypertension, and Osteoarthritis. Past Surgical History:  has a past surgical history that includes eye surgery and Cardiac catheterization (Left, 08/05/15). Social History:  reports that he has been smoking cigarettes. He has a 3.90 pack-year smoking history. He has never used smokeless tobacco. He reports that he does not drink alcohol and does not use drugs. Family History: family history includes Cancer in his brother, father, and mother. The patients home medications have been reviewed. Allergies: Codeine and Atorvastatin    -------------------------------------------------- RESULTS -------------------------------------------------  All laboratory and radiology results have been personally reviewed by myself   LABS:  Results for orders placed or performed during the hospital encounter of 09/05/21   COVID-19, Rapid    Specimen: Nasopharyngeal Swab   Result Value Ref Range    Specimen Description . NASOPHARYNGEAL SWAB     SARS-CoV-2, Rapid Not Detected Not Detected       RADIOLOGY:  Interpreted by Radiologist.  XR CHEST COPD exacerbation ([facility] Stable       DISPOSITION  Disposition: Discharge to home  Patient condition is stable                  Caryle Delude, MD  09/05/21 6434

## 2021-12-15 ENCOUNTER — HOSPITAL ENCOUNTER (EMERGENCY)
Age: 71
Discharge: HOME | End: 2021-12-15
Attending: FAMILY MEDICINE
Payer: OTHER GOVERNMENT

## 2021-12-15 VITALS
TEMPERATURE: 98.2 F | HEIGHT: 70 IN | DIASTOLIC BLOOD PRESSURE: 72 MMHG | SYSTOLIC BLOOD PRESSURE: 134 MMHG | RESPIRATION RATE: 18 BRPM | OXYGEN SATURATION: 96 % | BODY MASS INDEX: 24.97 KG/M2 | WEIGHT: 174.4 LBS | HEART RATE: 59 BPM

## 2021-12-15 DIAGNOSIS — Z20.822 LAB TEST NEGATIVE FOR COVID-19 VIRUS: ICD-10-CM

## 2021-12-15 DIAGNOSIS — J20.9 ACUTE BRONCHITIS, UNSPECIFIED ORGANISM: Primary | ICD-10-CM

## 2021-12-15 DIAGNOSIS — Z87.09 HISTORY OF COPD: ICD-10-CM

## 2021-12-15 LAB
SARS-COV-2, RAPID: NOT DETECTED
SPECIMEN DESCRIPTION: NORMAL

## 2021-12-15 PROCEDURE — C9803 HOPD COVID-19 SPEC COLLECT: HCPCS

## 2021-12-15 PROCEDURE — 87635 SARS-COV-2 COVID-19 AMP PRB: CPT

## 2021-12-15 PROCEDURE — 99283 EMERGENCY DEPT VISIT LOW MDM: CPT

## 2021-12-15 RX ORDER — AMOXICILLIN AND CLAVULANATE POTASSIUM 875; 125 MG/1; MG/1
1 TABLET, FILM COATED ORAL 2 TIMES DAILY
Qty: 20 TABLET | Refills: 0 | Status: SHIPPED | OUTPATIENT
Start: 2021-12-15 | End: 2021-12-25

## 2021-12-15 RX ORDER — PREDNISONE 20 MG/1
40 TABLET ORAL DAILY
Qty: 10 TABLET | Refills: 0 | Status: SHIPPED | OUTPATIENT
Start: 2021-12-15 | End: 2021-12-20

## 2021-12-16 NOTE — ED PROVIDER NOTES
[facility]  eMERGENCY dEPARTMENT eNCOUnter          [facility]       Chief Complaint   Patient presents with    Concern For COVID-19     Pt has had runny nose and congestion for past month. [facility] and [facility]       Nurses Notes reviewed and I agree except as noted in the HPI. HISTORY OF PRESENT ILLNESS    Eden Spencer is a 66 y.o. male who presents to the emergency room via private vehicle, patient concerned for having bronchitis, patient has known end-stage COPD and does have home nebulizer and home O2 as well as portable 2, states of having cough with some yellow production of sputum, feels short of breath with exertion greater than 30 feet which is abnormal for him. Patient denies any fever denies any known sick contacts. Patient has some concern for COVID-19. REVIEW OF SYSTEMS     Review of Systems   All other systems reviewed and are negative. PAST MEDICAL HISTORY    has a past medical history of Abdominal aneurysm ([address] Aortic aneurysm ([address] Atrial fibrillation ([address] Chicken pox, Chronic back pain, COPD (chronic obstructive pulmonary disease) ([address] Emphysema of lung ([address] Emphysema of lung ([address] Hyperlipidemia, Hypertension, and Osteoarthritis. SURGICAL HISTORY      has a past surgical history that includes eye surgery and Cardiac catheterization (Left, 08/05/15).     CURRENT MEDICATIONS       Discharge Medication List as of 12/15/2021  8:13 PM      CONTINUE these medications which have NOT CHANGED    Details   furosemide (LASIX) 20 MG tablet Take 20 mg by mouth dailyHistorical Med      dilTIAZem (CARDIZEM CD) 180 MG extended release capsule Take 1 capsule by mouth daily, Disp-90 capsule, R-3Normal      aspirin 81 MG EC tablet Take 81 mg by mouth dailyHistorical Med      !! predniSONE (DELTASONE) 20 MG tablet Take 20 mg by mouth dailyHistorical Med      omeprazole (PRILOSEC) 20 MG delayed release capsule Take 20 mg by mouth 2 times dailyHistorical Med      albuterol (PROVENTIL) (2.5 MG/3ML) 0.083% nebulizer solution Take 2.5 mg by nebulization every 6 hours as needed for WheezingHistorical Med      fluticasone (FLONASE) 50 MCG/ACT nasal spray 1 spray by Nasal route daily, Disp-1 Bottle, R-3Normal      rosuvastatin (CRESTOR) 20 MG tablet Take 20 mg by mouth dailyHistorical Med      OXYGEN Inhale 3.5 L into the lungs Historical Med      carvedilol (COREG) 25 MG tablet Take 25 mg by mouth 2 times daily (with meals) Historical Med      budesonide-formoterol (SYMBICORT) 160-4.5 MCG/ACT AERO Inhale 2 puffs into the lungs 2 times daily. albuterol (PROVENTIL;VENTOLIN) 90 MCG/ACT inhaler Inhale 2 puffs into the lungs every 6 hours as needed. tiotropium (SPIRIVA HANDIHALER) 18 MCG inhalation capsule Inhale 18 mcg into the lungs daily. !! - Potential duplicate medications found. Please discuss with provider. ALLERGIES     is allergic to codeine and atorvastatin. FAMILY HISTORY     He indicated that his mother is . He indicated that his father is . He indicated that his sister is alive. He indicated that his brother is . family history includes Cancer in his brother, father, and mother. SOCIAL HISTORY      reports that he has been smoking cigarettes. He has a 3.90 pack-year smoking history. He has never used smokeless tobacco. He reports that he does not drink alcohol and does not use drugs. PHYSICAL EXAM     INITIAL VITALS:  height is 5' 10\" (1.778 m) and weight is 174 lb 6.4 oz (79.1 kg). His oral temperature is 98.2 °F (36.8 °C). His blood pressure is 134/72 and his pulse is 59. His respiration is 18 and oxygen saturation is 96%. Physical Exam   Constitutional: Patient is oriented to person, place, and time. Patient appears well-developed and well-nourished. Patient is active and cooperative. HENT:   Head: Normocephalic and atraumatic. Head is without contusion.    Right Ear: Hearing and external ear normal. No drainage. Left Ear: Hearing and external ear normal. No drainage. Nose: Nose normal. No nasal deformity. No epistaxis. Mouth/Throat: Mucous membranes are not dry. Negative oral lesions or exudate, mild posterior pharyngeal noted  Eyes: EOMI. Conjunctivae, sclera, and lids are normal. Right eye exhibits no discharge. Left eye exhibits no discharge. Neck: Full passive range of motion without pain and phonation normal.   Cardiovascular:  Normal rate, regular rhythm and intact distal pulses. Pulses: Right radial pulse  2+   Pulmonary/Chest: Effort normal. No tachypnea and no bradypnea. Fine central coarseness without gross stridor or rhonchi  Abdominal: Soft. Patient without distension or tenderness  Musculoskeletal:   Negative acute trauma or deformity,  apparent full range of motion and normal strength all extremities appropriate to age. Neurological: Patient is alert and oriented to person, place, and time. patient displays no tremor. Patient displays no seizure activity. Skin: Skin is warm and dry. Patient is not diaphoretic. Psychiatric: Patient has a normal mood and affect.  Patient speech is normal and behavior is normal. Cognition and memory are normal.    DIFFERENTIAL DIAGNOSIS:   COPD exacerbation, pneumonia bronchitis URI    DIAGNOSTIC RESULTS           RADIOLOGY: non-plain film images(s) such as CT, Ultrasound and MRI are read by the radiologist.  No orders to display       LABS:   Labs Reviewed   COVID-19, RAPID       EMERGENCY DEPARTMENT COURSE:   Vitals:    Vitals:    12/15/21 1846   BP: 134/72   Pulse: 59   Resp: 18   Temp: 98.2 °F (36.8 °C)   TempSrc: Oral   SpO2: 96%   Weight: 174 lb 6.4 oz (79.1 kg)   Height: 5' 10\" (1.778 m)     Patient history and physical exam taken, discussed patient symptoms and exam findings, discussed getting rapid Covid test and will reevaluate, acknowledged    Rapid Covid negative    Discussed with patient likely acute bronchitis, noting patient history of end-stage COPD, and under the MIPS 2021 guidelines, will start patient on Augmentin, short course steroids, patient already has a rescue inhaler and nebulizer solution at home, advised close follow-up with his primary care, return to ER if any symptoms change worsen or other concerns, acknowledged    FINAL IMPRESSION      1. Acute bronchitis, unspecified organism    2. History of COPD    3. Lab test negative for COVID-19 virus          DISPOSITION/PLAN   D/c    PATIENT REFERRED TO:  Jony Huynh DO  [address] [place] [address],[address] [place] [address]    Call       [place] [facility]  [address] [address]  [phone]    As needed, If symptoms worsen      DISCHARGE MEDICATIONS:  Discharge Medication List as of 12/15/2021  8:13 PM      START taking these medications    Details   ! ! predniSONE (DELTASONE) 20 MG tablet Take 2 tablets by mouth daily for 5 days, Disp-10 tablet, R-0Normal      amoxicillin-clavulanate (AUGMENTIN) 875-125 MG per tablet Take 1 tablet by mouth 2 times daily for 10 days, Disp-20 tablet, R-0Normal       !! - Potential duplicate medications found. Please discuss with provider. Summation      Patient Course:  D/c    ED Medications administered this visit:  Medications - No data to display    New Prescriptions from this visit:    Discharge Medication List as of 12/15/2021  8:13 PM      START taking these medications    Details   ! ! predniSONE (DELTASONE) 20 MG tablet Take 2 tablets by mouth daily for 5 days, Disp-10 tablet, R-0Normal      amoxicillin-clavulanate (AUGMENTIN) 875-125 MG per tablet Take 1 tablet by mouth 2 times daily for 10 days, Disp-20 tablet, R-0Normal       !! - Potential duplicate medications found. Please discuss with provider.           Follow-up:  Kaylene Coto [facility] [address] [facility] [address]    Call       [place] [facility]  [address] [address] [phone]    As needed, If symptoms worsen        Final Impression:   1. Acute bronchitis, unspecified organism    2. History of COPD    3.  Lab test negative for COVID-19 virus               (Please note that portions of this note were completed with a voice recognition program.  Efforts were made to edit the dictations but occasionally words are mis-transcribed.)    MD Asad Thapa MD  12/16/21 6299

## 2021-12-29 ENCOUNTER — HOSPITAL ENCOUNTER (EMERGENCY)
Age: 71
Discharge: HOME | End: 2021-12-29
Attending: EMERGENCY MEDICINE
Payer: OTHER GOVERNMENT

## 2021-12-29 ENCOUNTER — APPOINTMENT (OUTPATIENT)
Dept: GENERAL RADIOLOGY | Age: 71
End: 2021-12-29
Payer: OTHER GOVERNMENT

## 2021-12-29 ENCOUNTER — HOSPITAL ENCOUNTER (OUTPATIENT)
Age: 71
Discharge: HOME | End: 2021-12-29
Payer: OTHER GOVERNMENT

## 2021-12-29 VITALS
OXYGEN SATURATION: 98 % | RESPIRATION RATE: 22 BRPM | HEART RATE: 84 BPM | SYSTOLIC BLOOD PRESSURE: 115 MMHG | DIASTOLIC BLOOD PRESSURE: 53 MMHG

## 2021-12-29 DIAGNOSIS — I48.91 ATRIAL FIBRILLATION WITH RVR (HCC): ICD-10-CM

## 2021-12-29 DIAGNOSIS — I48.91 ATRIAL FIBRILLATION WITH RVR (HCC): Primary | ICD-10-CM

## 2021-12-29 LAB
ABSOLUTE EOS #: 0.1 K/UL (ref 0–0.4)
ABSOLUTE IMMATURE GRANULOCYTE: ABNORMAL K/UL (ref 0–0.3)
ABSOLUTE LYMPH #: 1.4 K/UL (ref 1–4.8)
ABSOLUTE MONO #: 0.7 K/UL (ref 0–1)
ANION GAP SERPL CALCULATED.3IONS-SCNC: 9 MMOL/L (ref 9–17)
BASOPHILS # BLD: 1 % (ref 0–2)
BASOPHILS ABSOLUTE: 0 K/UL (ref 0–0.2)
BNP INTERPRETATION: ABNORMAL
BUN BLDV-MCNC: 16 MG/DL (ref 8–23)
BUN/CREAT BLD: 19 (ref 9–20)
CALCIUM SERPL-MCNC: 9.2 MG/DL (ref 8.6–10.4)
CHLORIDE BLD-SCNC: 98 MMOL/L (ref 98–107)
CO2: 34 MMOL/L (ref 20–31)
CREAT SERPL-MCNC: 0.85 MG/DL (ref 0.7–1.2)
DIFFERENTIAL TYPE: YES
EOSINOPHILS RELATIVE PERCENT: 2 % (ref 0–5)
GFR AFRICAN AMERICAN: >60 ML/MIN
GFR NON-AFRICAN AMERICAN: >60 ML/MIN
GFR SERPL CREATININE-BSD FRML MDRD: ABNORMAL ML/MIN/{1.73_M2}
GFR SERPL CREATININE-BSD FRML MDRD: ABNORMAL ML/MIN/{1.73_M2}
GLUCOSE BLD-MCNC: 115 MG/DL (ref 70–99)
HCT VFR BLD CALC: 33.4 % (ref 41–53)
HEMOGLOBIN: 11 G/DL (ref 13.5–17.5)
IMMATURE GRANULOCYTES: ABNORMAL %
LYMPHOCYTES # BLD: 18 % (ref 13–44)
MCH RBC QN AUTO: 29.6 PG (ref 26–34)
MCHC RBC AUTO-ENTMCNC: 32.8 G/DL (ref 31–37)
MCV RBC AUTO: 90.1 FL (ref 80–100)
MONOCYTES # BLD: 9 % (ref 5–9)
NRBC AUTOMATED: ABNORMAL PER 100 WBC
PDW BLD-RTO: 15 % (ref 12.1–15.2)
PLATELET # BLD: 191 K/UL (ref 140–450)
PLATELET ESTIMATE: ABNORMAL
PMV BLD AUTO: ABNORMAL FL (ref 6–12)
POTASSIUM SERPL-SCNC: 4.2 MMOL/L (ref 3.7–5.3)
PRO-BNP: 1293 PG/ML
RBC # BLD: 3.71 M/UL (ref 4.5–5.9)
RBC # BLD: ABNORMAL 10*6/UL
SEG NEUTROPHILS: 70 % (ref 39–75)
SEGMENTED NEUTROPHILS ABSOLUTE COUNT: 5.3 K/UL (ref 2.1–6.5)
SODIUM BLD-SCNC: 141 MMOL/L (ref 135–144)
TROPONIN INTERP: NORMAL
TROPONIN T: NORMAL NG/ML
TROPONIN, HIGH SENSITIVITY: 8 NG/L (ref 0–22)
WBC # BLD: 7.5 K/UL (ref 3.5–11)
WBC # BLD: ABNORMAL 10*3/UL

## 2021-12-29 PROCEDURE — 71045 X-RAY EXAM CHEST 1 VIEW: CPT

## 2021-12-29 PROCEDURE — 84484 ASSAY OF TROPONIN QUANT: CPT

## 2021-12-29 PROCEDURE — 93005 ELECTROCARDIOGRAM TRACING: CPT | Performed by: EMERGENCY MEDICINE

## 2021-12-29 PROCEDURE — 83880 ASSAY OF NATRIURETIC PEPTIDE: CPT

## 2021-12-29 PROCEDURE — 93226 XTRNL ECG REC<48 HR SCAN A/R: CPT

## 2021-12-29 PROCEDURE — 85025 COMPLETE CBC W/AUTO DIFF WBC: CPT

## 2021-12-29 PROCEDURE — 99284 EMERGENCY DEPT VISIT MOD MDM: CPT

## 2021-12-29 PROCEDURE — 93225 XTRNL ECG REC<48 HRS REC: CPT

## 2021-12-29 PROCEDURE — 6370000000 HC RX 637 (ALT 250 FOR IP): Performed by: EMERGENCY MEDICINE

## 2021-12-29 PROCEDURE — 80048 BASIC METABOLIC PNL TOTAL CA: CPT

## 2021-12-29 RX ORDER — DILTIAZEM HYDROCHLORIDE 180 MG/1
180 CAPSULE, COATED, EXTENDED RELEASE ORAL 2 TIMES DAILY
Qty: 30 CAPSULE | Refills: 3 | Status: SHIPPED | OUTPATIENT
Start: 2021-12-29 | End: 2021-12-31 | Stop reason: SDUPTHER

## 2021-12-29 RX ADMIN — APIXABAN 5 MG: 5 TABLET, FILM COATED ORAL at 18:41

## 2021-12-29 ASSESSMENT — PAIN SCALES - GENERAL: PAINLEVEL_OUTOF10: 0

## 2021-12-29 NOTE — ED PROVIDER NOTES
HPI:  12/29/21,   Time: 6:21 PM DARRICK Stiles is a 66 y.o. male presenting to the ED for palpitations and tachycardia and history of A. fib with RVR, beginning over the last day ago. The complaint has been intermittent, moderate in severity, and worsened by nothing. No alleviating factors. No fever chills night sweats no chest pain but does have slight shortness of breath    ROS:   Pertinent positives and negatives are stated within HPI, all other systems reviewed and are negative.  --------------------------------------------- PAST HISTORY ---------------------------------------------  Past Medical History:  has a past medical history of Abdominal [address] [address] Aortic aneurysm ([address] Atrial fibrillation ([address] Chicken pox, Chronic back pain, COPD (chronic obstructive pulmonary disease) ([address] Emphysema of lung ([address] Emphysema of lung ([address] Hyperlipidemia, Hypertension, and Osteoarthritis. Past Surgical History:  has a past surgical history that includes eye surgery and Cardiac catheterization (Left, 08/05/15). Social History:  reports that he has been smoking cigarettes. He has a 3.90 pack-year smoking history. He has never used smokeless tobacco. He reports that he does not drink alcohol and does not use drugs. Family History: family history includes Cancer in his brother, father, and mother. The patients home medications have been reviewed.     Allergies: Codeine, Atorvastatin, and Morphine    -------------------------------------------------- RESULTS -------------------------------------------------  All laboratory and radiology results have been personally reviewed by myself   LABS:  Results for orders placed or performed during the hospital encounter of 12/29/21   CBC Auto Differential   Result Value Ref Range    WBC 7.5 3.5 - 11.0 k/uL    RBC 3.71 (L) 4.5 - 5.9 m/uL    Hemoglobin 11.0 (L) 13.5 - 17.5 g/dL    Hematocrit 33.4 (L) 41 - 53 %    MCV 90.1 80 - 100 fL    MCH 29.6 26 - 34 pg    MCHC 32.8 31 - 37 g/dL    RDW 15.0 12.1 - 15.2 %    Platelets 207 901 - 290 k/uL    MPV NOT REPORTED 6.0 - 12.0 fL    NRBC Automated NOT REPORTED per 100 WBC    Differential Type YES     Seg Neutrophils 70 39 - 75 %    Lymphocytes 18 13 - 44 %    Monocytes 9 5 - 9 %    Eosinophils % 2 0 - 5 %    Basophils 1 0 - 2 %    Immature Granulocytes NOT REPORTED 0 %    Segs Absolute 5.30 2.1 - 6.5 k/uL    Absolute Lymph # 1.40 1.0 - 4.8 k/uL    Absolute Mono # 0.70 0.0 - 1.0 k/uL    Absolute Eos # 0.10 0.0 - 0.4 k/uL    Basophils Absolute 0.00 0.0 - 0.2 k/uL    Absolute Immature Granulocyte NOT REPORTED 0.00 - 0.30 k/uL    WBC Morphology NOT REPORTED     RBC Morphology NOT REPORTED     Platelet Estimate NOT REPORTED    Basic Metabolic Panel w/ Reflex to MG   Result Value Ref Range    Glucose 115 (H) 70 - 99 mg/dL    BUN 16 8 - 23 mg/dL    CREATININE 0.85 0.70 - 1.20 mg/dL    Bun/Cre Ratio 19 9 - 20    Calcium 9.2 8.6 - 10.4 mg/dL    Sodium 141 135 - 144 mmol/L    Potassium 4.2 3.7 - 5.3 mmol/L    Chloride 98 98 - 107 mmol/L    CO2 34 (H) 20 - 31 mmol/L    Anion Gap 9 9 - 17 mmol/L    GFR Non-African American >60 >60 mL/min    GFR African American >60 >60 mL/min    GFR Comment          GFR Staging NOT REPORTED    Troponin   Result Value Ref Range    Troponin, High Sensitivity 8 0 - 22 ng/L    Troponin T NOT REPORTED <0.03 ng/mL    Troponin Interp NOT REPORTED    Brain Natriuretic Peptide   Result Value Ref Range    Pro-BNP 1,293 (H) <300 pg/mL    BNP Interpretation NOT REPORTED    EKG 12 Lead   Result Value Ref Range    Ventricular Rate 76 BPM    Atrial Rate 366 BPM    QRS Duration 80 ms    Q-T Interval 380 ms    QTc Calculation (Bazett) 427 ms    R Axis 74 degrees    T Axis 84 degrees       RADIOLOGY:  Interpreted by Radiologist.  XR CHEST PORTABLE   Final Result   1.  The heart size remains top normal.   2. Abnormal persistent central vascular crowding/congestion without focal    increased prominence of interlobular septations about both lungs presumably    related to edema. 3. Calcified intrathoracic nodes and subcentimeter left midlung calcified    granuloma from old healed granulomatous disease. Background COPD    pattern/emphysema may also be present. 4. There is no pneumothorax or acute osseous change. 5. Discontinuous calcified pleural plaque formation is again suspected. Please    see report of CT of the chest from 11/15/2021 for further detail.             ------------------------- NURSING NOTES AND VITALS REVIEWED ---------------------------   The nursing notes within the ED encounter and vital signs as below have been reviewed. /71   Pulse 80   Resp 27   SpO2 99%   Oxygen Saturation Interpretation: Normal      ---------------------------------------------------PHYSICAL EXAM--------------------------------------      Constitutional/General: Alert and oriented x3, mildly ill appearing, non toxic in NAD  Head: NC/AT  Eyes: PERRL, EOMI  Mouth: Oropharynx clear, handling secretions, no trismus  Neck: Supple, full ROM, no meningeal signs  Pulmonary: Lungs clear to auscultation bilaterally, no wheezes, rales, or rhonchi. Not in respiratory distress  Cardiovascular: Irregular rhythm with regular rate, no murmurs, gallops, or rubs. 2+ distal pulses  Abdomen: Soft, non tender, non distended,   Extremities: Moves all extremities x 4. Warm and well perfused, mild bilateral calf swelling  Skin: warm and dry without rash  Neurologic: GCS 15,  Psych: Normal Affect      ------------------------------ ED COURSE/MEDICAL DECISION MAKING----------------------  Medications   apixaban (ELIQUIS) tablet 5 mg (5 mg Oral Not Given 12/29/21 1844)         Medical Decision Making:    Paroxysmal A. fib with RVR-patient unable to be on blood thinners because of his aneurysm history, I spoke with Dr. Leopold Massing cardiologist at [place] [address] who recommended doubling rate better with Cardizem  twice daily    Counseling:    The emergency provider has spoken with the patient and discussed todays results, in addition to providing specific details for the plan of care and counseling regarding the diagnosis and prognosis. Questions are answered at this time and they are agreeable with the plan.      --------------------------------- IMPRESSION AND DISPOSITION ---------------------------------    IMPRESSION  1.  Atrial fibrillation with RVR ([address] Controlled       DISPOSITION  Disposition: Discharge to home  Patient condition is stable                  Carlos Owen MD  12/29/21 2059

## 2021-12-30 LAB
EKG ATRIAL RATE: 366 BPM
EKG Q-T INTERVAL: 380 MS
EKG QRS DURATION: 80 MS
EKG QTC CALCULATION (BAZETT): 427 MS
EKG R AXIS: 74 DEGREES
EKG T AXIS: 84 DEGREES
EKG VENTRICULAR RATE: 76 BPM

## 2021-12-30 PROCEDURE — 93010 ELECTROCARDIOGRAM REPORT: CPT | Performed by: INTERNAL MEDICINE

## 2021-12-31 ENCOUNTER — HOSPITAL ENCOUNTER (INPATIENT)
Age: 71
LOS: 7 days | Discharge: HOME | DRG: 308 | End: 2022-01-07
Attending: FAMILY MEDICINE | Admitting: INTERNAL MEDICINE
Payer: OTHER GOVERNMENT

## 2021-12-31 ENCOUNTER — APPOINTMENT (OUTPATIENT)
Dept: GENERAL RADIOLOGY | Age: 71
DRG: 308 | End: 2021-12-31
Payer: OTHER GOVERNMENT

## 2021-12-31 DIAGNOSIS — Z86.79 HISTORY OF ATRIAL FIBRILLATION: ICD-10-CM

## 2021-12-31 DIAGNOSIS — R60.0 BILATERAL LOWER EXTREMITY EDEMA: ICD-10-CM

## 2021-12-31 DIAGNOSIS — I48.91 ATRIAL FIBRILLATION WITH RVR (HCC): ICD-10-CM

## 2021-12-31 DIAGNOSIS — Z20.822 LAB TEST NEGATIVE FOR COVID-19 VIRUS: ICD-10-CM

## 2021-12-31 DIAGNOSIS — I48.0 PAROXYSMAL ATRIAL FIBRILLATION (HCC): ICD-10-CM

## 2021-12-31 DIAGNOSIS — I49.8 ATRIAL DYSRHYTHMIA: Primary | ICD-10-CM

## 2021-12-31 LAB
ABSOLUTE EOS #: 0.1 K/UL (ref 0–0.4)
ABSOLUTE IMMATURE GRANULOCYTE: ABNORMAL K/UL (ref 0–0.3)
ABSOLUTE LYMPH #: 1.6 K/UL (ref 1–4.8)
ABSOLUTE MONO #: 1 K/UL (ref 0–1)
ANION GAP SERPL CALCULATED.3IONS-SCNC: 6 MMOL/L (ref 9–17)
ANION GAP SERPL CALCULATED.3IONS-SCNC: 7 MMOL/L (ref 9–17)
BASOPHILS # BLD: 0 % (ref 0–2)
BASOPHILS ABSOLUTE: 0 K/UL (ref 0–0.2)
BNP INTERPRETATION: ABNORMAL
BUN BLDV-MCNC: 11 MG/DL (ref 8–23)
BUN BLDV-MCNC: 12 MG/DL (ref 8–23)
BUN/CREAT BLD: 14 (ref 9–20)
BUN/CREAT BLD: 17 (ref 9–20)
CALCIUM SERPL-MCNC: 9.1 MG/DL (ref 8.6–10.4)
CALCIUM SERPL-MCNC: 9.4 MG/DL (ref 8.6–10.4)
CHLORIDE BLD-SCNC: 93 MMOL/L (ref 98–107)
CHLORIDE BLD-SCNC: 94 MMOL/L (ref 98–107)
CO2: 36 MMOL/L (ref 20–31)
CO2: 38 MMOL/L (ref 20–31)
CREAT SERPL-MCNC: 0.72 MG/DL (ref 0.7–1.2)
CREAT SERPL-MCNC: 0.76 MG/DL (ref 0.7–1.2)
DIFFERENTIAL TYPE: YES
EOSINOPHILS RELATIVE PERCENT: 1 % (ref 0–5)
GFR AFRICAN AMERICAN: >60 ML/MIN
GFR AFRICAN AMERICAN: >60 ML/MIN
GFR NON-AFRICAN AMERICAN: >60 ML/MIN
GFR NON-AFRICAN AMERICAN: >60 ML/MIN
GFR SERPL CREATININE-BSD FRML MDRD: ABNORMAL ML/MIN/{1.73_M2}
GLUCOSE BLD-MCNC: 144 MG/DL (ref 70–99)
GLUCOSE BLD-MCNC: 196 MG/DL (ref 70–99)
HCT VFR BLD CALC: 32.2 % (ref 41–53)
HEMOGLOBIN: 10.3 G/DL (ref 13.5–17.5)
IMMATURE GRANULOCYTES: ABNORMAL %
LYMPHOCYTES # BLD: 14 % (ref 13–44)
MCH RBC QN AUTO: 28 PG (ref 26–34)
MCHC RBC AUTO-ENTMCNC: 32 G/DL (ref 31–37)
MCV RBC AUTO: 87.5 FL (ref 80–100)
MONOCYTES # BLD: 8 % (ref 5–9)
NRBC AUTOMATED: ABNORMAL PER 100 WBC
PDW BLD-RTO: 15.5 % (ref 12.1–15.2)
PLATELET # BLD: 205 K/UL (ref 140–450)
PLATELET ESTIMATE: ABNORMAL
PMV BLD AUTO: ABNORMAL FL (ref 6–12)
POTASSIUM SERPL-SCNC: 4.3 MMOL/L (ref 3.7–5.3)
POTASSIUM SERPL-SCNC: 4.7 MMOL/L (ref 3.7–5.3)
PRO-BNP: 708 PG/ML
RBC # BLD: 3.68 M/UL (ref 4.5–5.9)
RBC # BLD: ABNORMAL 10*6/UL
SARS-COV-2, RAPID: NOT DETECTED
SEG NEUTROPHILS: 77 % (ref 39–75)
SEGMENTED NEUTROPHILS ABSOLUTE COUNT: 9.2 K/UL (ref 2.1–6.5)
SODIUM BLD-SCNC: 135 MMOL/L (ref 135–144)
SODIUM BLD-SCNC: 139 MMOL/L (ref 135–144)
SPECIMEN DESCRIPTION: NORMAL
TROPONIN INTERP: NORMAL
TROPONIN INTERP: NORMAL
TROPONIN T: NORMAL NG/ML
TROPONIN T: NORMAL NG/ML
TROPONIN, HIGH SENSITIVITY: 7 NG/L (ref 0–22)
TROPONIN, HIGH SENSITIVITY: <6 NG/L (ref 0–22)
WBC # BLD: 11.9 K/UL (ref 3.5–11)
WBC # BLD: ABNORMAL 10*3/UL

## 2021-12-31 PROCEDURE — 6370000000 HC RX 637 (ALT 250 FOR IP): Performed by: INTERNAL MEDICINE

## 2021-12-31 PROCEDURE — 2580000003 HC RX 258: Performed by: INTERNAL MEDICINE

## 2021-12-31 PROCEDURE — 93005 ELECTROCARDIOGRAM TRACING: CPT | Performed by: FAMILY MEDICINE

## 2021-12-31 PROCEDURE — 96375 TX/PRO/DX INJ NEW DRUG ADDON: CPT

## 2021-12-31 PROCEDURE — 6370000000 HC RX 637 (ALT 250 FOR IP): Performed by: FAMILY MEDICINE

## 2021-12-31 PROCEDURE — 96374 THER/PROPH/DIAG INJ IV PUSH: CPT

## 2021-12-31 PROCEDURE — 6360000002 HC RX W HCPCS: Performed by: INTERNAL MEDICINE

## 2021-12-31 PROCEDURE — 87635 SARS-COV-2 COVID-19 AMP PRB: CPT

## 2021-12-31 PROCEDURE — 84484 ASSAY OF TROPONIN QUANT: CPT

## 2021-12-31 PROCEDURE — 94664 DEMO&/EVAL PT USE INHALER: CPT

## 2021-12-31 PROCEDURE — 6360000002 HC RX W HCPCS: Performed by: FAMILY MEDICINE

## 2021-12-31 PROCEDURE — 94640 AIRWAY INHALATION TREATMENT: CPT

## 2021-12-31 PROCEDURE — 80048 BASIC METABOLIC PNL TOTAL CA: CPT

## 2021-12-31 PROCEDURE — C9803 HOPD COVID-19 SPEC COLLECT: HCPCS

## 2021-12-31 PROCEDURE — 1200000000 HC SEMI PRIVATE

## 2021-12-31 PROCEDURE — 71045 X-RAY EXAM CHEST 1 VIEW: CPT

## 2021-12-31 PROCEDURE — 94761 N-INVAS EAR/PLS OXIMETRY MLT: CPT

## 2021-12-31 PROCEDURE — 36415 COLL VENOUS BLD VENIPUNCTURE: CPT

## 2021-12-31 PROCEDURE — 83880 ASSAY OF NATRIURETIC PEPTIDE: CPT

## 2021-12-31 PROCEDURE — 99285 EMERGENCY DEPT VISIT HI MDM: CPT

## 2021-12-31 PROCEDURE — 96376 TX/PRO/DX INJ SAME DRUG ADON: CPT

## 2021-12-31 PROCEDURE — 2500000003 HC RX 250 WO HCPCS: Performed by: FAMILY MEDICINE

## 2021-12-31 PROCEDURE — 85025 COMPLETE CBC W/AUTO DIFF WBC: CPT

## 2021-12-31 PROCEDURE — 2700000000 HC OXYGEN THERAPY PER DAY

## 2021-12-31 RX ORDER — ROSUVASTATIN CALCIUM 20 MG/1
20 TABLET, COATED ORAL DAILY
Status: DISCONTINUED | OUTPATIENT
Start: 2022-01-01 | End: 2022-01-07 | Stop reason: HOSPADM

## 2021-12-31 RX ORDER — DILTIAZEM HYDROCHLORIDE 5 MG/ML
10 INJECTION INTRAVENOUS ONCE
Status: COMPLETED | OUTPATIENT
Start: 2021-12-31 | End: 2021-12-31

## 2021-12-31 RX ORDER — AMIODARONE HYDROCHLORIDE 50 MG/ML
INJECTION, SOLUTION INTRAVENOUS
Status: DISPENSED
Start: 2021-12-31 | End: 2022-01-01

## 2021-12-31 RX ORDER — SODIUM CHLORIDE 9 MG/ML
25 INJECTION, SOLUTION INTRAVENOUS PRN
Status: DISCONTINUED | OUTPATIENT
Start: 2021-12-31 | End: 2022-01-07 | Stop reason: HOSPADM

## 2021-12-31 RX ORDER — DIGOXIN 0.25 MG/ML
125 INJECTION INTRAMUSCULAR; INTRAVENOUS DAILY
Status: DISCONTINUED | OUTPATIENT
Start: 2021-12-31 | End: 2022-01-03

## 2021-12-31 RX ORDER — ONDANSETRON 2 MG/ML
4 INJECTION INTRAMUSCULAR; INTRAVENOUS EVERY 6 HOURS PRN
Status: DISCONTINUED | OUTPATIENT
Start: 2021-12-31 | End: 2022-01-07 | Stop reason: HOSPADM

## 2021-12-31 RX ORDER — SODIUM CHLORIDE 0.9 % (FLUSH) 0.9 %
5-40 SYRINGE (ML) INJECTION PRN
Status: DISCONTINUED | OUTPATIENT
Start: 2021-12-31 | End: 2022-01-07 | Stop reason: HOSPADM

## 2021-12-31 RX ORDER — DILTIAZEM HYDROCHLORIDE 240 MG/1
240 CAPSULE, COATED, EXTENDED RELEASE ORAL 2 TIMES DAILY
Qty: 60 CAPSULE | Refills: 1 | Status: SHIPPED | OUTPATIENT
Start: 2021-12-31 | End: 2022-01-07 | Stop reason: HOSPADM

## 2021-12-31 RX ORDER — FLUTICASONE PROPIONATE 50 MCG
1 SPRAY, SUSPENSION (ML) NASAL DAILY
Status: DISCONTINUED | OUTPATIENT
Start: 2022-01-01 | End: 2022-01-07 | Stop reason: HOSPADM

## 2021-12-31 RX ORDER — METHYLPREDNISOLONE SODIUM SUCCINATE 125 MG/2ML
80 INJECTION, POWDER, LYOPHILIZED, FOR SOLUTION INTRAMUSCULAR; INTRAVENOUS ONCE
Status: DISCONTINUED | OUTPATIENT
Start: 2021-12-31 | End: 2021-12-31

## 2021-12-31 RX ORDER — DILTIAZEM HYDROCHLORIDE 5 MG/ML
10 INJECTION INTRAVENOUS ONCE
Status: DISCONTINUED | OUTPATIENT
Start: 2021-12-31 | End: 2021-12-31

## 2021-12-31 RX ORDER — BUDESONIDE AND FORMOTEROL FUMARATE DIHYDRATE 160; 4.5 UG/1; UG/1
2 AEROSOL RESPIRATORY (INHALATION) 2 TIMES DAILY
Status: DISCONTINUED | OUTPATIENT
Start: 2021-12-31 | End: 2022-01-07 | Stop reason: HOSPADM

## 2021-12-31 RX ORDER — ACETAMINOPHEN 325 MG/1
650 TABLET ORAL EVERY 6 HOURS PRN
Status: DISCONTINUED | OUTPATIENT
Start: 2021-12-31 | End: 2022-01-07 | Stop reason: HOSPADM

## 2021-12-31 RX ORDER — METHYLPREDNISOLONE SODIUM SUCCINATE 40 MG/ML
40 INJECTION, POWDER, LYOPHILIZED, FOR SOLUTION INTRAMUSCULAR; INTRAVENOUS EVERY 12 HOURS
Status: DISCONTINUED | OUTPATIENT
Start: 2021-12-31 | End: 2022-01-03

## 2021-12-31 RX ORDER — ONDANSETRON 4 MG/1
4 TABLET, ORALLY DISINTEGRATING ORAL EVERY 8 HOURS PRN
Status: DISCONTINUED | OUTPATIENT
Start: 2021-12-31 | End: 2022-01-07 | Stop reason: HOSPADM

## 2021-12-31 RX ORDER — FUROSEMIDE 40 MG/1
40 TABLET ORAL 2 TIMES DAILY
Qty: 60 TABLET | Refills: 0 | Status: SHIPPED | OUTPATIENT
Start: 2021-12-31 | End: 2022-01-07 | Stop reason: HOSPADM

## 2021-12-31 RX ORDER — AMIODARONE HYDROCHLORIDE 50 MG/ML
150 INJECTION, SOLUTION INTRAVENOUS ONCE
Status: COMPLETED | OUTPATIENT
Start: 2021-12-31 | End: 2021-12-31

## 2021-12-31 RX ORDER — LEVALBUTEROL INHALATION SOLUTION 1.25 MG/3ML
1.25 SOLUTION RESPIRATORY (INHALATION)
Status: DISCONTINUED | OUTPATIENT
Start: 2022-01-01 | End: 2022-01-07 | Stop reason: HOSPADM

## 2021-12-31 RX ORDER — IPRATROPIUM BROMIDE AND ALBUTEROL SULFATE 2.5; .5 MG/3ML; MG/3ML
2 SOLUTION RESPIRATORY (INHALATION) ONCE
Status: COMPLETED | OUTPATIENT
Start: 2021-12-31 | End: 2021-12-31

## 2021-12-31 RX ORDER — ASPIRIN 81 MG/1
81 TABLET ORAL DAILY
Status: DISCONTINUED | OUTPATIENT
Start: 2022-01-01 | End: 2022-01-07 | Stop reason: HOSPADM

## 2021-12-31 RX ORDER — PANTOPRAZOLE SODIUM 40 MG/1
40 TABLET, DELAYED RELEASE ORAL
Status: DISCONTINUED | OUTPATIENT
Start: 2022-01-01 | End: 2022-01-07 | Stop reason: HOSPADM

## 2021-12-31 RX ORDER — SODIUM CHLORIDE 0.9 % (FLUSH) 0.9 %
5-40 SYRINGE (ML) INJECTION EVERY 12 HOURS SCHEDULED
Status: DISCONTINUED | OUTPATIENT
Start: 2021-12-31 | End: 2022-01-07 | Stop reason: HOSPADM

## 2021-12-31 RX ORDER — DILTIAZEM HYDROCHLORIDE 120 MG/1
240 CAPSULE, COATED, EXTENDED RELEASE ORAL 2 TIMES DAILY
Status: DISCONTINUED | OUTPATIENT
Start: 2021-12-31 | End: 2022-01-03

## 2021-12-31 RX ORDER — CARVEDILOL 12.5 MG/1
25 TABLET ORAL 2 TIMES DAILY WITH MEALS
Status: DISCONTINUED | OUTPATIENT
Start: 2022-01-01 | End: 2022-01-05

## 2021-12-31 RX ORDER — POLYETHYLENE GLYCOL 3350 17 G/17G
17 POWDER, FOR SOLUTION ORAL DAILY PRN
Status: DISCONTINUED | OUTPATIENT
Start: 2021-12-31 | End: 2022-01-07 | Stop reason: HOSPADM

## 2021-12-31 RX ORDER — FUROSEMIDE 20 MG/1
20 TABLET ORAL DAILY
Status: DISCONTINUED | OUTPATIENT
Start: 2022-01-01 | End: 2022-01-05

## 2021-12-31 RX ORDER — ACETAMINOPHEN 650 MG/1
650 SUPPOSITORY RECTAL EVERY 6 HOURS PRN
Status: DISCONTINUED | OUTPATIENT
Start: 2021-12-31 | End: 2022-01-07 | Stop reason: HOSPADM

## 2021-12-31 RX ADMIN — AMIODARONE HYDROCHLORIDE 150 MG: 50 INJECTION, SOLUTION INTRAVENOUS at 15:12

## 2021-12-31 RX ADMIN — DILTIAZEM HYDROCHLORIDE 10 MG: 5 INJECTION INTRAVENOUS at 16:13

## 2021-12-31 RX ADMIN — BUDESONIDE AND FORMOTEROL FUMARATE DIHYDRATE 2 PUFF: 160; 4.5 AEROSOL RESPIRATORY (INHALATION) at 23:34

## 2021-12-31 RX ADMIN — DILTIAZEM HYDROCHLORIDE 240 MG: 120 CAPSULE, COATED, EXTENDED RELEASE ORAL at 23:49

## 2021-12-31 RX ADMIN — DILTIAZEM HYDROCHLORIDE 10 MG: 5 INJECTION INTRAVENOUS at 19:24

## 2021-12-31 RX ADMIN — DEXTROSE MONOHYDRATE 150 MG: 50 INJECTION, SOLUTION INTRAVENOUS at 21:07

## 2021-12-31 RX ADMIN — DILTIAZEM HYDROCHLORIDE 60 MG: 30 TABLET, FILM COATED ORAL at 16:13

## 2021-12-31 RX ADMIN — CEFTRIAXONE SODIUM 1000 MG: 1 INJECTION, POWDER, FOR SOLUTION INTRAMUSCULAR; INTRAVENOUS at 23:52

## 2021-12-31 RX ADMIN — SODIUM CHLORIDE, PRESERVATIVE FREE 10 ML: 5 INJECTION INTRAVENOUS at 23:55

## 2021-12-31 RX ADMIN — DIGOXIN 125 MCG: 250 INJECTION, SOLUTION INTRAMUSCULAR; INTRAVENOUS; PARENTERAL at 20:29

## 2021-12-31 RX ADMIN — IPRATROPIUM BROMIDE AND ALBUTEROL SULFATE 2 AMPULE: .5; 3 SOLUTION RESPIRATORY (INHALATION) at 11:06

## 2021-12-31 RX ADMIN — AMIODARONE HYDROCHLORIDE 150 MG: 50 INJECTION, SOLUTION INTRAVENOUS at 14:03

## 2021-12-31 RX ADMIN — DILTIAZEM HYDROCHLORIDE 10 MG: 5 INJECTION INTRAVENOUS at 12:41

## 2021-12-31 ASSESSMENT — PAIN SCALES - GENERAL: PAINLEVEL_OUTOF10: 0

## 2022-01-01 ENCOUNTER — APPOINTMENT (OUTPATIENT)
Dept: CT IMAGING | Age: 72
DRG: 308 | End: 2022-01-01
Payer: OTHER GOVERNMENT

## 2022-01-01 LAB
ABSOLUTE EOS #: ABNORMAL K/UL (ref 0–0.4)
ABSOLUTE IMMATURE GRANULOCYTE: ABNORMAL K/UL (ref 0–0.3)
ABSOLUTE LYMPH #: 0.69 K/UL (ref 1–4.8)
ABSOLUTE MONO #: 0.23 K/UL (ref 0–1)
ANION GAP SERPL CALCULATED.3IONS-SCNC: 9 MMOL/L (ref 9–17)
BASOPHILS # BLD: 1 % (ref 0–2)
BASOPHILS ABSOLUTE: 0.08 K/UL (ref 0–0.2)
BUN BLDV-MCNC: 12 MG/DL (ref 8–23)
BUN/CREAT BLD: 16 (ref 9–20)
CALCIUM SERPL-MCNC: 9.4 MG/DL (ref 8.6–10.4)
CHLORIDE BLD-SCNC: 97 MMOL/L (ref 98–107)
CO2: 35 MMOL/L (ref 20–31)
CREAT SERPL-MCNC: 0.74 MG/DL (ref 0.7–1.2)
DIFFERENTIAL TYPE: ABNORMAL
EKG ATRIAL RATE: 144 BPM
EKG ATRIAL RATE: 61 BPM
EKG ATRIAL RATE: 64 BPM
EKG P AXIS: 61 DEGREES
EKG P AXIS: 86 DEGREES
EKG P AXIS: 94 DEGREES
EKG P-R INTERVAL: 128 MS
EKG P-R INTERVAL: 144 MS
EKG P-R INTERVAL: 146 MS
EKG Q-T INTERVAL: 250 MS
EKG Q-T INTERVAL: 392 MS
EKG Q-T INTERVAL: 406 MS
EKG QRS DURATION: 100 MS
EKG QRS DURATION: 84 MS
EKG QRS DURATION: 86 MS
EKG QTC CALCULATION (BAZETT): 387 MS
EKG QTC CALCULATION (BAZETT): 404 MS
EKG QTC CALCULATION (BAZETT): 408 MS
EKG R AXIS: 69 DEGREES
EKG R AXIS: 79 DEGREES
EKG R AXIS: 83 DEGREES
EKG T AXIS: -147 DEGREES
EKG T AXIS: 72 DEGREES
EKG T AXIS: 85 DEGREES
EKG VENTRICULAR RATE: 144 BPM
EKG VENTRICULAR RATE: 61 BPM
EKG VENTRICULAR RATE: 64 BPM
EOSINOPHILS RELATIVE PERCENT: ABNORMAL % (ref 0–5)
GFR AFRICAN AMERICAN: >60 ML/MIN
GFR NON-AFRICAN AMERICAN: >60 ML/MIN
GFR SERPL CREATININE-BSD FRML MDRD: ABNORMAL ML/MIN/{1.73_M2}
GFR SERPL CREATININE-BSD FRML MDRD: ABNORMAL ML/MIN/{1.73_M2}
GLUCOSE BLD-MCNC: 159 MG/DL (ref 70–99)
HCT VFR BLD CALC: 32.8 % (ref 41–53)
HEMOGLOBIN: 10.6 G/DL (ref 13.5–17.5)
IMMATURE GRANULOCYTES: ABNORMAL %
LYMPHOCYTES # BLD: 9 % (ref 13–44)
MCH RBC QN AUTO: 29.1 PG (ref 26–34)
MCHC RBC AUTO-ENTMCNC: 32.2 G/DL (ref 31–37)
MCV RBC AUTO: 90.4 FL (ref 80–100)
MONOCYTES # BLD: 3 % (ref 5–9)
MORPHOLOGY: ABNORMAL
NRBC AUTOMATED: ABNORMAL PER 100 WBC
PDW BLD-RTO: 15.1 % (ref 12.1–15.2)
PLATELET # BLD: 191 K/UL (ref 140–450)
PLATELET ESTIMATE: ABNORMAL
PMV BLD AUTO: ABNORMAL FL (ref 6–12)
POTASSIUM SERPL-SCNC: 4.4 MMOL/L (ref 3.7–5.3)
RBC # BLD: 3.63 M/UL (ref 4.5–5.9)
RBC # BLD: ABNORMAL 10*6/UL
SEG NEUTROPHILS: 87 % (ref 39–75)
SEGMENTED NEUTROPHILS ABSOLUTE COUNT: 6.7 K/UL (ref 2.1–6.5)
SODIUM BLD-SCNC: 141 MMOL/L (ref 135–144)
TROPONIN INTERP: NORMAL
TROPONIN T: NORMAL NG/ML
TROPONIN, HIGH SENSITIVITY: 6 NG/L (ref 0–22)
WBC # BLD: 7.7 K/UL (ref 3.5–11)
WBC # BLD: ABNORMAL 10*3/UL

## 2022-01-01 PROCEDURE — 94640 AIRWAY INHALATION TREATMENT: CPT

## 2022-01-01 PROCEDURE — 6370000000 HC RX 637 (ALT 250 FOR IP): Performed by: INTERNAL MEDICINE

## 2022-01-01 PROCEDURE — 36415 COLL VENOUS BLD VENIPUNCTURE: CPT

## 2022-01-01 PROCEDURE — 94761 N-INVAS EAR/PLS OXIMETRY MLT: CPT

## 2022-01-01 PROCEDURE — 2700000000 HC OXYGEN THERAPY PER DAY

## 2022-01-01 PROCEDURE — 1200000000 HC SEMI PRIVATE

## 2022-01-01 PROCEDURE — 71260 CT THORAX DX C+: CPT

## 2022-01-01 PROCEDURE — 93005 ELECTROCARDIOGRAM TRACING: CPT | Performed by: INTERNAL MEDICINE

## 2022-01-01 PROCEDURE — 70491 CT SOFT TISSUE NECK W/DYE: CPT

## 2022-01-01 PROCEDURE — 85025 COMPLETE CBC W/AUTO DIFF WBC: CPT

## 2022-01-01 PROCEDURE — 93010 ELECTROCARDIOGRAM REPORT: CPT | Performed by: INTERNAL MEDICINE

## 2022-01-01 PROCEDURE — 6360000004 HC RX CONTRAST MEDICATION: Performed by: INTERNAL MEDICINE

## 2022-01-01 PROCEDURE — 80048 BASIC METABOLIC PNL TOTAL CA: CPT

## 2022-01-01 PROCEDURE — 99254 IP/OBS CNSLTJ NEW/EST MOD 60: CPT | Performed by: INTERNAL MEDICINE

## 2022-01-01 PROCEDURE — 6360000002 HC RX W HCPCS: Performed by: INTERNAL MEDICINE

## 2022-01-01 PROCEDURE — 84484 ASSAY OF TROPONIN QUANT: CPT

## 2022-01-01 PROCEDURE — 2580000003 HC RX 258: Performed by: INTERNAL MEDICINE

## 2022-01-01 RX ORDER — AMIODARONE HYDROCHLORIDE 50 MG/ML
INJECTION, SOLUTION INTRAVENOUS
Status: DISPENSED
Start: 2022-01-01 | End: 2022-01-01

## 2022-01-01 RX ADMIN — BUDESONIDE AND FORMOTEROL FUMARATE DIHYDRATE 2 PUFF: 160; 4.5 AEROSOL RESPIRATORY (INHALATION) at 20:46

## 2022-01-01 RX ADMIN — CARVEDILOL 25 MG: 12.5 TABLET, FILM COATED ORAL at 09:34

## 2022-01-01 RX ADMIN — IPRATROPIUM BROMIDE 0.5 MG: 0.5 SOLUTION RESPIRATORY (INHALATION) at 09:08

## 2022-01-01 RX ADMIN — METHYLPREDNISOLONE SODIUM SUCCINATE 40 MG: 40 INJECTION, POWDER, FOR SOLUTION INTRAMUSCULAR; INTRAVENOUS at 23:18

## 2022-01-01 RX ADMIN — DILTIAZEM HYDROCHLORIDE 240 MG: 120 CAPSULE, COATED, EXTENDED RELEASE ORAL at 09:31

## 2022-01-01 RX ADMIN — SODIUM CHLORIDE, PRESERVATIVE FREE 10 ML: 5 INJECTION INTRAVENOUS at 09:36

## 2022-01-01 RX ADMIN — FUROSEMIDE 20 MG: 20 TABLET ORAL at 09:31

## 2022-01-01 RX ADMIN — LEVALBUTEROL HYDROCHLORIDE 1.25 MG: 1.25 SOLUTION RESPIRATORY (INHALATION) at 04:49

## 2022-01-01 RX ADMIN — METHYLPREDNISOLONE SODIUM SUCCINATE 40 MG: 40 INJECTION, POWDER, FOR SOLUTION INTRAMUSCULAR; INTRAVENOUS at 01:54

## 2022-01-01 RX ADMIN — DIGOXIN 125 MCG: 250 INJECTION, SOLUTION INTRAMUSCULAR; INTRAVENOUS; PARENTERAL at 09:41

## 2022-01-01 RX ADMIN — SODIUM CHLORIDE, PRESERVATIVE FREE 10 ML: 5 INJECTION INTRAVENOUS at 11:41

## 2022-01-01 RX ADMIN — IPRATROPIUM BROMIDE 0.5 MG: 0.5 SOLUTION RESPIRATORY (INHALATION) at 04:49

## 2022-01-01 RX ADMIN — FLUTICASONE PROPIONATE 1 SPRAY: 50 SPRAY, METERED NASAL at 09:17

## 2022-01-01 RX ADMIN — BUDESONIDE AND FORMOTEROL FUMARATE DIHYDRATE 2 PUFF: 160; 4.5 AEROSOL RESPIRATORY (INHALATION) at 09:17

## 2022-01-01 RX ADMIN — LEVALBUTEROL HYDROCHLORIDE 1.25 MG: 1.25 SOLUTION RESPIRATORY (INHALATION) at 16:09

## 2022-01-01 RX ADMIN — IOPAMIDOL 100 ML: 755 INJECTION, SOLUTION INTRAVENOUS at 08:26

## 2022-01-01 RX ADMIN — CEFTRIAXONE SODIUM 1000 MG: 1 INJECTION, POWDER, FOR SOLUTION INTRAMUSCULAR; INTRAVENOUS at 23:22

## 2022-01-01 RX ADMIN — PANTOPRAZOLE SODIUM 40 MG: 40 TABLET, DELAYED RELEASE ORAL at 06:20

## 2022-01-01 RX ADMIN — ENOXAPARIN SODIUM 40 MG: 100 INJECTION SUBCUTANEOUS at 09:30

## 2022-01-01 RX ADMIN — ASPIRIN 81 MG: 81 TABLET, COATED ORAL at 09:31

## 2022-01-01 RX ADMIN — IPRATROPIUM BROMIDE 0.5 MG: 0.5 SOLUTION RESPIRATORY (INHALATION) at 16:09

## 2022-01-01 RX ADMIN — ROSUVASTATIN CALCIUM 20 MG: 20 TABLET, COATED ORAL at 21:35

## 2022-01-01 RX ADMIN — LEVALBUTEROL HYDROCHLORIDE 1.25 MG: 1.25 SOLUTION RESPIRATORY (INHALATION) at 20:47

## 2022-01-01 RX ADMIN — LEVALBUTEROL HYDROCHLORIDE 1.25 MG: 1.25 SOLUTION RESPIRATORY (INHALATION) at 09:08

## 2022-01-01 RX ADMIN — IPRATROPIUM BROMIDE 0.5 MG: 0.5 SOLUTION RESPIRATORY (INHALATION) at 20:46

## 2022-01-01 RX ADMIN — DILTIAZEM HYDROCHLORIDE 240 MG: 120 CAPSULE, COATED, EXTENDED RELEASE ORAL at 21:35

## 2022-01-01 RX ADMIN — SODIUM CHLORIDE, PRESERVATIVE FREE 10 ML: 5 INJECTION INTRAVENOUS at 21:36

## 2022-01-01 RX ADMIN — CARVEDILOL 25 MG: 12.5 TABLET, FILM COATED ORAL at 16:33

## 2022-01-01 RX ADMIN — METHYLPREDNISOLONE SODIUM SUCCINATE 40 MG: 40 INJECTION, POWDER, FOR SOLUTION INTRAMUSCULAR; INTRAVENOUS at 11:41

## 2022-01-01 RX ADMIN — SODIUM CHLORIDE, PRESERVATIVE FREE 10 ML: 5 INJECTION INTRAVENOUS at 01:22

## 2022-01-01 ASSESSMENT — PAIN SCALES - GENERAL
PAINLEVEL_OUTOF10: 0

## 2022-01-01 ASSESSMENT — ENCOUNTER SYMPTOMS: COUGH: 1

## 2022-01-01 NOTE — PLAN OF CARE
Problem: SAFETY  Goal: Free from accidental physical injury  Outcome: Met This Shift  Goal: Free from intentional harm  Outcome: Met This Shift     Problem: DAILY CARE  Goal: Daily care needs are met  Outcome: Met This Shift     Problem: PAIN  Goal: Patient's pain/discomfort is manageable  Outcome: Met This Shift     Problem: SKIN INTEGRITY  Goal: Skin integrity is maintained or improved  Outcome: Met This Shift     Problem: DISCHARGE BARRIERS  Goal: Patient's continuum of care needs are met  Outcome: Met This Shift     Problem: KNOWLEDGE DEFICIT  Goal: Patient/S.O. demonstrates understanding of disease process, treatment plan, medications, and discharge instructions.   Outcome: Ongoing     Problem: Health Behavior:  Goal: Will modify at least one risk factor affecting health status  Description: Will modify at least one risk factor affecting health status  Outcome: Not Met This Shift

## 2022-01-01 NOTE — PROGRESS NOTES
Pt returns from CT. Dr Ciro Tapia updated. Pt remains in NSR. EGK ordered per TO. Amioderone bolus on hold at this time per Dr Ciro Tapia.   Electronically signed by Roshan Duarte RN on 1/1/2022 at 8:50 AM

## 2022-01-01 NOTE — PROGRESS NOTES
Spoke with Dr Teri Inman and she states she would like patient's heart rate less rapid and more stable prior to admitting, Dr Emily Whitley informed

## 2022-01-01 NOTE — ED PROVIDER NOTES
[facility]  eMERGENCY dEPARTMENT eNCOUnter          [address] [facility]       Chief Complaint   Patient presents with    Shortness of Breath     started this morning       Nurses Notes reviewed and I agree except as noted in the HPI. HISTORY OF PRESENT ILLNESS    Alan Kapoor is a 66 y.o. male who presents to the emergency room via EMS from home, with wife said patient had increased shortness of breath, cough, onset over the past 2 days. No known sick contacts no fevers. Per EMS patient was 80% room air, however 6 L improved to 97%. Wife states patient had increased lower extremity edema over his baseline. States patient was tested for Covid 12/15 was told he was negative. Patient was in the emergency room a few days ago with respiratory difficulties and was diagnosed with AFRVR at that time, was treated and discharged home. Patient states compliant with medications with symptoms returning, came for reevaluation. Patient denies pain otherwise. REVIEW OF SYSTEMS     Review of Systems   Constitutional: Negative for fever. Respiratory: Positive for cough. Cardiovascular: Positive for leg swelling. All other systems reviewed and are negative. PAST MEDICAL HISTORY    has a past medical history of Abdominal aneurysm ([address] Aortic aneurysm ([address] Atrial fibrillation ([address] Chicken pox, Chronic back pain, COPD (chronic obstructive pulmonary disease) ([address] Emphysema of lung ([address] Emphysema of lung ([address] Hyperlipidemia, Hypertension, and Osteoarthritis. SURGICAL HISTORY      has a past surgical history that includes eye surgery and Cardiac catheterization (Left, 08/05/15).     CURRENT MEDICATIONS       Current Discharge Medication List      CONTINUE these medications which have NOT CHANGED    Details   !! furosemide (LASIX) 20 MG tablet Take 20 mg by mouth daily      aspirin 81 MG EC tablet Take 81 mg by mouth daily      predniSONE (DELTASONE) 20 MG tablet Take 20 mg by mouth daily      omeprazole (PRILOSEC) 20 MG delayed release capsule Take 20 mg by mouth 2 times daily      albuterol (PROVENTIL) (2.5 MG/3ML) 0.083% nebulizer solution Take 2.5 mg by nebulization every 6 hours as needed for Wheezing      fluticasone (FLONASE) 50 MCG/ACT nasal spray 1 spray by Nasal route daily  Qty: 1 Bottle, Refills: 3      rosuvastatin (CRESTOR) 20 MG tablet Take 20 mg by mouth daily      OXYGEN Inhale 3.5 L into the lungs       carvedilol (COREG) 25 MG tablet Take 25 mg by mouth 2 times daily (with meals)       budesonide-formoterol (SYMBICORT) 160-4.5 MCG/ACT AERO Inhale 2 puffs into the lungs 2 times daily. albuterol (PROVENTIL;VENTOLIN) 90 MCG/ACT inhaler Inhale 2 puffs into the lungs every 6 hours as needed. tiotropium (SPIRIVA HANDIHALER) 18 MCG inhalation capsule Inhale 18 mcg into the lungs daily. !! - Potential duplicate medications found. Please discuss with provider. ALLERGIES     is allergic to codeine, atorvastatin, and morphine. FAMILY HISTORY     He indicated that his mother is . He indicated that his father is . He indicated that his sister is alive. He indicated that his brother is . family history includes Cancer in his brother, father, and mother. SOCIAL HISTORY      reports that he quit smoking about 4 years ago. His smoking use included cigarettes. He has a 3.90 pack-year smoking history. He has never used smokeless tobacco. He reports that he does not drink alcohol and does not use drugs. PHYSICAL EXAM     INITIAL VITALS:  height is 5' 10\" (1.778 m) and weight is 178 lb 8 oz (81 kg). His oral temperature is 98.6 °F (37 °C). His blood pressure is 129/72 and his pulse is 107. His respiration is 20 and oxygen saturation is 92%. Physical Exam   Constitutional: Patient is oriented to person, place, and time. Patient appears well-developed and well-nourished. Patient is active and cooperative.     HENT:   Head: Normocephalic and atraumatic. Head is without contusion. Right Ear: Hearing and external ear normal. No drainage. Left Ear: Hearing and external ear normal. No drainage. Nose: Nose normal. No nasal deformity. No epistaxis. Mouth/Throat: Mucous membranes are not dry. Eyes: EOMI. Conjunctivae, sclera, and lids are normal. Right eye exhibits no discharge. Left eye exhibits no discharge. Neck: Full passive range of motion without pain and phonation normal.  Negative JVD negative tracheal deviation  Cardiovascular:  Normal rate, regular rhythm and intact distal pulses. Noted bilateral lower extremity edema with pitting 2-3+  Pulses: Right radial pulse  2+   Pulmonary/Chest: Effort normal. No tachypnea and no bradypnea. Fine coarseness bilateral lower fields without gross stridor or rhonchi  Abdominal: Soft. Patient without distension or tenderness  Musculoskeletal:   Negative acute trauma or deformity,  apparent full range of motion and normal strength all extremities appropriate to age. Neurological: Patient is alert and oriented to person, place, and time. patient displays no tremor. Patient displays no seizure activity. Skin: Skin is warm and dry. Patient is not diaphoretic. Psychiatric: Patient has a normal mood and affect. Patient speech is normal and behavior is normal. Cognition and memory are normal.    DIFFERENTIAL DIAGNOSIS:   HAYDE, dehydration, viral illness NOS, dysrhythmia    DIAGNOSTIC RESULTS     EKG: All EKG's are interpreted by the Emergency Department Physician who either signs or Co-signs this chart in the absence of a cardiologist.  EKG    The patient had an EKG which is interpreted by me in the absence of a Cardiologist.   [] Without comparison to previous.    [x] With comparison to a previous EKG Dated 12/29/2021, 7/11/2021    EKG @ 1057 hrs -sinus rhythm rate 64 normal axis normal intervals,  prior EKG 7/11/2021 no changes morphology    EKG @ 1233 hrs -sinus tachycardia with some intermittent atrial flutter with 1-2 conduction rate, rate 144, normal axis normal intervals, as compared to prior EKG dated 12/29/21, also noted atrial flutter with variable conduction      RADIOLOGY: non-plain film images(s) such as CT, Ultrasound and MRI are read by the radiologist.  XR CHEST PORTABLE   Final Result      Mild interstitial prominence within the lungs particularly in the mid to lower    lung zones not significantly changed likely reflecting interstitial edema. No overt pleural effusion or pneumothorax.                       LABS:   Labs Reviewed   CBC WITH AUTO DIFFERENTIAL - Abnormal; Notable for the following components:       Result Value    WBC 11.9 (*)     RBC 3.68 (*)     Hemoglobin 10.3 (*)     Hematocrit 32.2 (*)     RDW 15.5 (*)     Seg Neutrophils 77 (*)     Segs Absolute 9.20 (*)     All other components within normal limits   BASIC METABOLIC PANEL W/ REFLEX TO MG FOR LOW K - Abnormal; Notable for the following components:    Glucose 144 (*)     Chloride 93 (*)     CO2 36 (*)     Anion Gap 6 (*)     All other components within normal limits   BRAIN NATRIURETIC PEPTIDE - Abnormal; Notable for the following components:    Pro- (*)     All other components within normal limits   BASIC METABOLIC PANEL W/ REFLEX TO MG FOR LOW K - Abnormal; Notable for the following components:    Glucose 196 (*)     Chloride 94 (*)     CO2 38 (*)     Anion Gap 7 (*)     All other components within normal limits   COVID-19, RAPID   TROPONIN   TROPONIN   BASIC METABOLIC PANEL W/ REFLEX TO MG FOR LOW K   CBC WITH AUTO DIFFERENTIAL   TROPONIN       EMERGENCY DEPARTMENT COURSE:   Vitals:    Vitals:    12/31/21 2256 12/31/21 2325 12/31/21 2334 01/01/22 0100   BP: 129/72      Pulse: 110 140  107   Resp: 26  20    Temp: 98.6 °F (37 °C)      TempSrc: Oral      SpO2: 92%  92%    Weight: 178 lb 8 oz (81 kg)      Height:         Patient history and physical exam taken at bedside, discussed patient symptoms and exam findings, discussed initial work-up to include EKG chest x-ray blood work IV access. DuoNeb x2 ordered. Patient placed on cardiac monitor, pulse ox, sitting high semi-Fowlers in bed. EKG as above    Chest x-ray reviewed    Initial lab work-up reviewed    While awaiting patient to complete fluids, patient went into a dysrhythmia, noted heart rate increase, appeared to be atrial flutter with variable conduction 1-2 up to 1-6 pattern, this patient has no notable history of atrial flutter but does have atrial fibrillation, patient is anticoagulated, will continue to observe and contact cardiology discussed case. As patient did not get back out of his dysrhythmia, I did give Cardizem 10 mg IV x1. COVID-19 test NEGATIVE    Patient heart rate transiently responded to the Cardizem, however went back into the atrial flutter appearance, I did contact Dr. Alexei Josue from cardiology to discuss case, recommends amiodarone 150 mg IV x1, after 3 minutes patient is active working repeat, afterwards would contact him to discuss case, acknowledged    Orders placed for amiodarone as above    Discussed with patient later patient's wife my conversation with cardiologist, planned amiodarone and reevaluate, acknowledged    Patient initially showing some improvement after amiodarone, however patient would again go into variable atrial flutter with heart rates ranging from 75 up to 145, patient remaining otherwise asymptomatic. Case again discussed with Dr. Alexei Josue, advises Cardizem 60 p.o. and Cardizem 10 IV, reevaluate      Patient initially seemed heart rate under better control,. To be worn a sinus tach 100-110, with occasional variability into the 80s and as high as 125. We discussed possible discharge at this time with outpatient follow-up, as I previously discussed the patient's hospitalist the patient symptoms are better controlled start him on a higher dose Cardizem and outpatient follow-up.   Patient patient wife initially agreed with this, however patient would later develop worsening dysrhythmia while being taken out to his car, per wife, and brought back to emergency room for reevaluation. Plan was to discharge patient home if he will come for the outpatient follow-up, initially patient stated he wanted to go home and wife was willing, however upon willing patient out of the car she felt his heart rate again increased, brought immediately back to emergency room for reevaluation. We discussed possible admission at this time, I will contact hospitalist again to discuss medications to get part rebounder better control, acknowledged    Case discussed with Dr. Alfa Kenny, hospitalist, regarding patient's presentation work-up, initially excepted for admission, however has some concerns for patient heart rate being elevated, does asked that we get under better control. Again spoke with Dr. Jaelesa Ayala regarding his, and he was able to place orders on his own for digoxin and amiodarone. Per Dr. Jaleesa Ayala, he is agreeable with consulting for patient will come to the hospital tomorrow for face-to-face consultation, states is available throughout the night for calls to nursing as needed. Again discussed with Dr. Alfa Kenny overall work-up, we did observe patient emergency room until his heart rate came down to more of primarily sinus tach 100-1 10, the edition I do see a breakthrough atrial flutter type appearance albeit slowed. Patient be admitted directly to the floor            FINAL IMPRESSION      1. Atrial dysrhythmia    2. History of atrial fibrillation    3. Bilateral lower extremity edema    4.  Lab test negative for COVID-19 virus          DISPOSITION/PLAN   admit    PATIENT REFERRED TO:  Linda Ragland MD  [address] [facility] [address]    Call       [facility] [address], [address] [address] [facility] [address] [facility]  [phone]    Call   As needed    [facility] ED  [address] [facility] [address] [place] [address]  [phone]    As needed, If symptoms worsen      DISCHARGE MEDICATIONS:  Current Discharge Medication List      START taking these medications    Details   !! furosemide (LASIX) 40 MG tablet Take 1 tablet by mouth 2 times daily  Qty: 60 tablet, Refills: 0       !! - Potential duplicate medications found. Please discuss with provider.               Summation      Patient Course:  admit    ED Medications administered this visit:    Medications   aspirin EC tablet 81 mg (has no administration in time range)   budesonide-formoterol (SYMBICORT) 160-4.5 MCG/ACT inhaler 2 puff (2 puffs Inhalation Given 12/31/21 6204)   carvedilol (COREG) tablet 25 mg (has no administration in time range)   fluticasone (FLONASE) 50 MCG/ACT nasal spray 1 spray (has no administration in time range)   furosemide (LASIX) tablet 20 mg (has no administration in time range)   pantoprazole (PROTONIX) tablet 40 mg (has no administration in time range)   rosuvastatin (CRESTOR) tablet 20 mg (has no administration in time range)   levalbuterol (XOPENEX) nebulizer solution 1.25 mg (has no administration in time range)   methylPREDNISolone sodium (SOLU-MEDROL) injection 40 mg (has no administration in time range)   sodium chloride flush 0.9 % injection 5-40 mL (10 mLs IntraVENous Given 12/31/21 4215)   sodium chloride flush 0.9 % injection 5-40 mL (10 mLs IntraVENous Given 1/1/22 0122)   0.9 % sodium chloride infusion (has no administration in time range)   ondansetron (ZOFRAN-ODT) disintegrating tablet 4 mg (has no administration in time range)     Or   ondansetron (ZOFRAN) injection 4 mg (has no administration in time range)   polyethylene glycol (GLYCOLAX) packet 17 g (has no administration in time range)   enoxaparin (LOVENOX) injection 40 mg (has no administration in time range)   acetaminophen (TYLENOL) tablet 650 mg (has no administration in time range)     Or   acetaminophen (TYLENOL) suppository 650 mg (has no administration in time range)   ipratropium (ATROVENT) 0.02 % nebulizer solution 0.5 mg (has no administration in time range)   cefTRIAXone (ROCEPHIN) 1000 mg IVPB in 50 mL D5W minibag (1,000 mg IntraVENous New Bag 12/31/21 2352)   dilTIAZem (CARDIZEM CD) extended release capsule 240 mg (240 mg Oral Given 12/31/21 2349)   digoxin (LANOXIN) injection 125 mcg (125 mcg IntraVENous Given 12/31/21 2029)   amiodarone (CORDARONE) 150 MG/3ML injection (has no administration in time range)   ipratropium-albuterol (DUONEB) nebulizer solution 2 ampule (2 ampules Inhalation Given 12/31/21 1106)   dilTIAZem injection 10 mg (10 mg IntraVENous Given 12/31/21 1241)   amiodarone (CORDARONE) injection 150 mg (150 mg IntraVENous Given 12/31/21 1403)   amiodarone (CORDARONE) injection 150 mg (150 mg IntraVENous Given 12/31/21 1512)   dilTIAZem injection 10 mg (10 mg IntraVENous Given 12/31/21 1613)   dilTIAZem (CARDIZEM) tablet 60 mg (60 mg Oral Given 12/31/21 1613)   dilTIAZem injection 10 mg (10 mg IntraVENous Given 12/31/21 1924)   amiodarone (CORDARONE) 150 mg in dextrose 5 % 100 mL bolus (150 mg IntraVENous New Bag 12/31/21 2107)       New Prescriptions from this visit:    Current Discharge Medication List      START taking these medications    Details   !! furosemide (LASIX) 40 MG tablet Take 1 tablet by mouth 2 times daily  Qty: 60 tablet, Refills: 0       !! - Potential duplicate medications found. Please discuss with provider. Follow-up:  MD Trena Cantu [id number] [phone]    Call       [facility] [address], [address] [facility] [address] [phone]    Call   As needed    [facility] ED  [address] [facility] [address]  [phone]    As needed, If symptoms worsen        Final Impression:   1. Atrial dysrhythmia    2. History of atrial fibrillation    3. Bilateral lower extremity edema    4.  Lab test negative for COVID-19 virus               (Please note that portions of this note were completed with a voice recognition program.  Efforts were made to edit the dictations but occasionally words are mis-transcribed.)    MD Jayce Mays MD  01/01/22 4226

## 2022-01-01 NOTE — PROGRESS NOTES
Cardiology    1. Marked sob starting approx 1 month ago, worsening in past week - (pneumonia, bronchitis, PE, CHF?)  2. Near syncopal episodes where his pulse ox would drop to 60-70   ( PE, tachycardia?)  3. Atrial flutter with RVR after he arrived (rate 140), controlled now (still in atrial flutter with controlled response)  4. Appears to have preserved LV function on bedside echo (EF appeared to be in 50 range  5. Long hx of PAF not anticoagulated by his desire. 6.  Chest heaviness with activity over past month or when he becomes dyspneic (Angina?)  7. Bronchitis for past 2 weeks being followed by home health nurse who have been coming for 1 month  8. Marked weakness, barely able to walk in past month, worse in past 1-2 weeks  9. Severe COPD on home oxygen   10. Mass in lower neck, will be seeing Dr. Dara Bence, no testing as of yet. 11.  Ascites and pedal edema worse in past month (secondary to pulmonary hypertension vs CHF)    Plan:  1.  CT neck, chest, abd and pelvis- rule out PE, and define neck mass  2. Anticoagulate with NOAC after testing  3. Antibiotics per Dr. Connie Brown  4.  Echo  5. If he doesn't convert, may cardiovert possibly tomorrow or Monday  6. Nidia Jarquin possibly Monday    Discussion:  He has markedly deteriorated in past month with increasing weakness, sob, fatigue requiring increase in O2 to 3 1/2 liters/min. Worse yesterday with near syncopal episodes and sob, occasionally associated with hypoxia. He has been having some chest \"tightness\" which seems to be with activity. He did develop atrial fibrillation after he was in ER with 's, which was difficult to control. However, demonstrates symptoms are not just due to atrial flutter. Concerned about PE and will do ct with contrast for PE and for better visualization of lung status. He has hx of \"mass in lower neck\" and has appointment with Dr. Dara Bence, although I don't believe any imaging studies have been ordered.     His sob seems out of proportion to his cxr, hence the CT scan. However, he is coughing thick yellow sputum consistent with bronchitis, which is being treated. Currently on Lovenox. Rate is controlled, and will cardiovert if he doesn't convert. Define neck mass. NOAC before discharge.     Thanks, Arleen Alvarenga MD

## 2022-01-01 NOTE — H&P
History & Physical    Patient:  Pretty Campos  YOB: 1950  Date of Service: 1/1/2022  MRN: [id number]   Acct:   [de-identified]   Primary Care Physician: Kyrie Francois DO    Chief Complaint:   Chief Complaint   Patient presents with    Shortness of Breath     started this morning       History of Present Illness: The patient is a 66 y.o. male presented to the emergency room complaining of worsening shortness of breath, generalized weakness, hypoxia. The patient has a history of severe COPD with chronic respiratory failure and usually using around 3.5 L of oxygen continuously. He noticed that his shortness of breath became worse about 1 month ago and since then he has been progressively getting worse. Last several days it became more constant, severe. He had associated generalized weakness, some chest heaviness. He has been coughing but not more than usual.  Had no fevers or chills. Had episodes of dizziness and almost passing out. Denies abdominal pain, nausea/vomiting, diarrhea. Despite his severe COPD he has not been vaccinated for Covid. Patient states that his oxygen saturation was low at home with the symptoms he presented to the emergency room. Per EMS the patient was 80% on room air, placed on 6 L with improvement of sats to 97. Patient's work-up in the emergency room revealed tachycardia with heart rate in 140s. Oxygen saturation was 74% on room air but improved to 95 and 3.5 L. He was afebrile. EKG revealed atrial flutter with RVR. Chest x-ray revealed mild interstitial prominence within the lungs partially in the mid to lower lung zones not significantly changed, no pleural effusion or pneumothorax. WBC count was 11.9, H&H 10.3/32.2, BMP was unremarkable. Troponin was less than 6. COVID-19 was negative. Patient's case was discussed with his cardiologist Dr. Jose Lagos by ER physician.   He received amiodarone 150 mg x 1 he initially improved, however he went back into atrial flutter with RVR with heart rate ranging from 75-1 45. Patient then was treated with 60 mg of oral Cardizem, given 10 mg IV Cardizem. Unfortunately continued to have tachycardia. Received IV digoxin. He was also treated with IV steroids and breathing treatments for COPD exacerbation. Eventually his heart rate stabilized and he was admitted for further management. This morning he converted to normal sinus rhythm. Was evaluated by Dr. Itzel Cutler in hospital.  CT scan chest and abdomen/pelvis was ordered for further evaluation of worsening respiratory status. Patient also with history of questionable neck mass and CT neck was ordered as well. Past Medical History:        Diagnosis Date    Abdominal aneurysm ([address]     x2    Aortic aneurysm (HCC)     Atrial fibrillation (HCC)     Chicken pox     Chronic back pain     COPD (chronic obstructive pulmonary disease) (HCC)     Emphysema of lung (HCC)     Emphysema of lung (HCC)     Hyperlipidemia     Hypertension 2015    Osteoarthritis        Past Surgical History:        Procedure Laterality Date    CARDIAC CATHETERIZATION Left 08/05/15    Dr. Gela Curran 30% disease of the left main trunck, Mild plaque disease in the coronary arteries. Normal LV function, ejection fraction of 60%    EYE SURGERY         Home Medications:   No current facility-administered medications on file prior to encounter.      Current Outpatient Medications on File Prior to Encounter   Medication Sig Dispense Refill    furosemide (LASIX) 20 MG tablet Take 20 mg by mouth daily      aspirin 81 MG EC tablet Take 81 mg by mouth daily      predniSONE (DELTASONE) 20 MG tablet Take 20 mg by mouth daily      omeprazole (PRILOSEC) 20 MG delayed release capsule Take 20 mg by mouth 2 times daily      albuterol (PROVENTIL) (2.5 MG/3ML) 0.083% nebulizer solution Take 2.5 mg by nebulization every 6 hours as needed for Wheezing      fluticasone (FLONASE) 50 MCG/ACT nasal spray 1 spray by Nasal route daily 1 Bottle 3    rosuvastatin (CRESTOR) 20 MG tablet Take 20 mg by mouth daily      OXYGEN Inhale 3.5 L into the lungs       carvedilol (COREG) 25 MG tablet Take 25 mg by mouth 2 times daily (with meals)       budesonide-formoterol (SYMBICORT) 160-4.5 MCG/ACT AERO Inhale 2 puffs into the lungs 2 times daily.  albuterol (PROVENTIL;VENTOLIN) 90 MCG/ACT inhaler Inhale 2 puffs into the lungs every 6 hours as needed.  tiotropium (SPIRIVA HANDIHALER) 18 MCG inhalation capsule Inhale 18 mcg into the lungs daily. Allergies:  Codeine, Atorvastatin, and Morphine    Social History:    reports that he quit smoking about 4 years ago. His smoking use included cigarettes. He has a 3.90 pack-year smoking history. He has never used smokeless tobacco. He reports that he does not drink alcohol and does not use drugs. Family History:       Problem Relation Age of Onset   [facility] Cancer Mother         Pancreatic cancer    Cancer Father         Lung    Cancer Brother         Brain cancer, pancreatic cancer, colon cancer       Review of systems:  Constitutional: no fever, no night sweats, positive for generalized weakness  Head: no headache, no head injury  Eye: no blurring of vision, no double vision.   Ears: no earache, no hearing difficulty, no tinnitus  Mouth/throat: no sore throat, no  dysphagia  Lungs: Positive cough, shortness of breath, wheeze  CVS: no palpitation, positive for chest pressure/heaviness  GI: no abdominal pain, no nausea , no vomiting, no constipation  ABNER: no dysuria, frequency and urgency  Musculoskeletal: Positive for chronic low back pain  Endocrine: no polyuria, polydypsia, no cold or heat intolerence  Hematology: no anemia, no easy brusing or bleeding  Dermatology: no skin rash, no eczema, no prurities,  Psychiatry: no depression, no anxiety,no panic attacks  Neurology: no syncope, no seizures, no numbness or tingling of hands, no numbness or tingling of REPORTED     RBC Morphology NOT REPORTED     Platelet Estimate NOT REPORTED     Seg Neutrophils 77 (H) 39 - 75 %    Lymphocytes 14 13 - 44 %    Monocytes 8 5 - 9 %    Eosinophils % 1 0 - 5 %    Basophils 0 0 - 2 %    Segs Absolute 9.20 (H) 2.1 - 6.5 k/uL    Absolute Lymph # 1.60 1.0 - 4.8 k/uL    Absolute Mono # 1.00 0.0 - 1.0 k/uL    Absolute Eos # 0.10 0.0 - 0.4 k/uL    Basophils Absolute 0.00 0.0 - 0.2 k/uL   Basic Metabolic Panel w/ Reflex to MG    Collection Time: 12/31/21 11:01 AM   Result Value Ref Range    Glucose 144 (H) 70 - 99 mg/dL    BUN 12 8 - 23 mg/dL    CREATININE 0.72 0.70 - 1.20 mg/dL    Bun/Cre Ratio 17 9 - 20    Calcium 9.1 8.6 - 10.4 mg/dL    Sodium 135 135 - 144 mmol/L    Potassium 4.3 3.7 - 5.3 mmol/L    Chloride 93 (L) 98 - 107 mmol/L    CO2 36 (H) 20 - 31 mmol/L    Anion Gap 6 (L) 9 - 17 mmol/L    GFR Non-African American >60 >60 mL/min    GFR African American >60 >60 mL/min    GFR Comment          GFR Staging NOT REPORTED    Troponin    Collection Time: 12/31/21 11:01 AM   Result Value Ref Range    Troponin, High Sensitivity 7 0 - 22 ng/L    Troponin T NOT REPORTED <0.03 ng/mL    Troponin Interp NOT REPORTED    Brain Natriuretic Peptide    Collection Time: 12/31/21 11:01 AM   Result Value Ref Range    Pro- (H) <300 pg/mL    BNP Interpretation NOT REPORTED    EKG 12 Lead    Collection Time: 12/31/21 12:33 PM   Result Value Ref Range    Ventricular Rate 144 BPM    Atrial Rate 144 BPM    P-R Interval 144 ms    QRS Duration 100 ms    Q-T Interval 250 ms    QTc Calculation (Bazett) 387 ms    P Axis 94 degrees    R Axis 69 degrees    T Axis -147 degrees   COVID-19, Rapid    Collection Time: 12/31/21  2:08 PM    Specimen: Nasopharyngeal Swab   Result Value Ref Range    Specimen Description . NASOPHARYNGEAL SWAB     SARS-CoV-2, Rapid Not Detected Not Detected   Basic Metabolic Panel w/ Reflex to MG    Collection Time: 12/31/21  6:52 PM   Result Value Ref Range    Glucose 196 (H) 70 - 99 mg/dL BUN 11 8 - 23 mg/dL    CREATININE 0.76 0.70 - 1.20 mg/dL    Bun/Cre Ratio 14 9 - 20    Calcium 9.4 8.6 - 10.4 mg/dL    Sodium 139 135 - 144 mmol/L    Potassium 4.7 3.7 - 5.3 mmol/L    Chloride 94 (L) 98 - 107 mmol/L    CO2 38 (H) 20 - 31 mmol/L    Anion Gap 7 (L) 9 - 17 mmol/L    GFR Non-African American >60 >60 mL/min    GFR African American >60 >60 mL/min    GFR Comment          GFR Staging NOT REPORTED    Troponin    Collection Time: 12/31/21 11:02 PM   Result Value Ref Range    Troponin, High Sensitivity <6 0 - 22 ng/L    Troponin T NOT REPORTED <0.03 ng/mL    Troponin Interp NOT REPORTED    Basic Metabolic Panel w/ Reflex to MG    Collection Time: 01/01/22  5:19 AM   Result Value Ref Range    Glucose 159 (H) 70 - 99 mg/dL    BUN 12 8 - 23 mg/dL    CREATININE 0.74 0.70 - 1.20 mg/dL    Bun/Cre Ratio 16 9 - 20    Calcium 9.4 8.6 - 10.4 mg/dL    Sodium 141 135 - 144 mmol/L    Potassium 4.4 3.7 - 5.3 mmol/L    Chloride 97 (L) 98 - 107 mmol/L    CO2 35 (H) 20 - 31 mmol/L    Anion Gap 9 9 - 17 mmol/L    GFR Non-African American >60 >60 mL/min    GFR African American >60 >60 mL/min    GFR Comment          GFR Staging NOT REPORTED    CBC auto differential    Collection Time: 01/01/22  5:19 AM   Result Value Ref Range    WBC 7.7 3.5 - 11.0 k/uL    RBC 3.63 (L) 4.5 - 5.9 m/uL    Hemoglobin 10.6 (L) 13.5 - 17.5 g/dL    Hematocrit 32.8 (L) 41 - 53 %    MCV 90.4 80 - 100 fL    MCH 29.1 26 - 34 pg    MCHC 32.2 31 - 37 g/dL    RDW 15.1 12.1 - 15.2 %    Platelets 160 379 - 145 k/uL    MPV NOT REPORTED 6.0 - 12.0 fL    NRBC Automated NOT REPORTED per 100 WBC    Differential Type NOT REPORTED     Immature Granulocytes NOT REPORTED 0 %    Absolute Immature Granulocyte NOT REPORTED 0.00 - 0.30 k/uL    WBC Morphology NOT REPORTED     RBC Morphology NOT REPORTED     Platelet Estimate NOT REPORTED     Seg Neutrophils 87 (H) 39 - 75 %    Lymphocytes 9 (L) 13 - 44 %    Monocytes 3 (L) 5 - 9 %    Eosinophils %      0 - 5 % Basophils 1 0 - 2 %    Segs Absolute 6.70 (H) 2.1 - 6.5 k/uL    Absolute Lymph # 0.69 (L) 1.0 - 4.8 k/uL    Absolute Mono # 0.23 0.0 - 1.0 k/uL    Absolute Eos #      0.0 - 0.4 k/uL    Basophils Absolute 0.08 0.0 - 0.2 k/uL    Morphology Manual Differential Performed    Troponin    Collection Time: 01/01/22  5:19 AM   Result Value Ref Range    Troponin, High Sensitivity 6 0 - 22 ng/L    Troponin T NOT REPORTED <0.03 ng/mL    Troponin Interp NOT REPORTED        Radiology:     CT SOFT TISSUE NECK W CONTRAST    Result Date: 1/1/2022  EXAM: CT SOFT TISSUE NECK W CONTRAST CLINICAL INDICATION: Suprasternal mass. COMPARISON: CT chest 1/1/2022 TECHNIQUE: Standard enhanced CT of the neck following intravenous administration of 100 cc of Isovue 370. Axial sections with coronal and sagittal reformats were obtained. Dose reduction techniques were achieved by using automated exposure control and/or adjustment of mA and/or kV according to patient size and/or use of iterative reconstruction technique. FINDINGS: Evaluation is limited due to patient motion artifact. Lymph Nodes/Soft Tissues: No extranodal soft tissue mass, abnormal enhancement, or acute soft tissue abnormalities. No discrete soft tissue abnormality in the region of the cutaneous marker indicating the patient's stated palpable abnormality. No enlarged or morphologically abnormal lymph nodes. Nasopharynx: Normal. Suprahyoid Neck: Oropharynx, oral cavity, parapharyngeal, and retropharyngeal spaces are clear and symmetric. Infrahyoid Neck: Larynx, hypopharynx, and supraglottic area are clear and symmetric. Vocal cords are symmetric. Parotid Glands: Normal. Submandibular Glands: Normal. Thyroid: Normal. Mastoid Air Cells: Well aerated as visualized. Skull Base: Normal as visualized. Vascular Structures: Patent. Atherosclerotic calcifications of the carotid arteries. Musculoskeletal: No acute osseous abnormality. Multilevel cervical spondylotic changes.      No extranodal soft tissue mass, abnormal enhancement, or lymphadenopathy in the neck. No discrete soft tissue abnormality in the region of the cutaneous marker indicating the patient's stated palpable abnormality. XR CHEST PORTABLE    Result Date: 12/31/2021  EXAM: XR CHEST PORTABLE HISTORY: Reason for exam:->SOB. COMPARISON: Chest radiograph 12/29/2021. FINDINGS: Portable AP view is performed demonstrating mild mid to lower lung zone predominant interstitial opacities most likely reflecting interstitial edema. No new confluent consolidation. No gross pleural effusion or pneumothorax identified. Cardiac silhouette is stable. Osseous structures are grossly intact as well. Mild interstitial prominence within the lungs particularly in the mid to lower lung zones not significantly changed likely reflecting interstitial edema. No overt pleural effusion or pneumothorax. CT CHEST ABDOMEN PELVIS W CONTRAST    Result Date: 1/1/2022  EXAMINATION: CT CHEST ABDOMEN PELVIS W CONTRAST HISTORY: Reason for exam:->SOB COMPARISON: CT chest from 9/15/2021 and CT angiography of the abdomen and pelvis from 4/15/2021. TECHNIQUE: CT examination of the chest, abdomen, and pelvis following the administration of intravenous contrast. Coronal and sagittal reformations were performed Dose reduction techniques were achieved by using automated exposure control and/or adjustment of mA and/or kV according to patient size and/or use of iterative reconstruction technique. FINDINGS: CHEST: There are small bilateral, right greater than left layering pleural effusions. Heart is stable in size. There is no bulky lymphadenopathy, however there are prominent subcentimeter mediastinal lymph nodes noted with reference AP window level lymph node on image 27 measuring approximately 0.9 cm in short axis. These appear to be more pronounced than the prior study and most likely reactive in etiology.  Thoracic aorta nonaneurysmal and contains moderate calcified and noncalcified atherosclerotic plaque. The central pulmonary arteries are enlarged with the main pulmonary artery measuring 3.5 cm in transverse dimension compatible with pulmonary arterial hypertension. Imaged portion of the thyroid parenchyma relatively homogeneous in attenuation. There are bilateral calcified and noncalcified pleural plaques compatible with prior asbestos exposure. There is dependent mild right lower lobe consolidative changes which may represent atelectasis, however pneumonia and/or aspiration not excluded. Dependent mild lingular and left lower lobe atelectasis. A few additional obliquely oriented subsegmental foci of atelectatic changes are present within the left lower lobe as well as right middle lobe and right lower lobe. The central airways are grossly patent. ABDOMEN: The liver, spleen, pancreas, adrenal glands, and right kidney are unremarkable. There is a cyst involving the posterior left mid kidney measuring 2 cm in size. No hydronephrosis. There is no bulky abdominal lymphadenopathy or ascites. Fusiform infrarenal abdominal aortic aneurysm measures 3.9 x 3.5 cm on image 89 with moderate amount of mural thrombus not significantly changed from 4/15/2021. A saccular appearing aneurysm more superiorly involving the abdominal aorta just at and below the level of the renal arteries measures 3.7 x 2.8 cm also containing moderate amount of thrombus and not overtly changed from 4/15/2021. No bulky abdominal lymphadenopathy or ascites. Gallbladder present. No biliary ductal dilatation. PELVIS: Prostate normal in size. Small bilateral fat-containing inguinal hernias. The small and large bowel loops are normal in caliber without evidence of wall thickening. No bowel obstruction identified. There is no mesenteric inflammatory change. Images on bone windows shows no acute osseous abnormality. CHEST: Small layering bilateral, right greater than left pleural effusions.  Dependent consolidation, mild in severity involving the right lower lobe which is nonspecific and may represent atelectasis, pneumonia, or aspiration. Mild dependent left lower lobe as well as lingular atelectasis. Additional linear subsegmental bilateral lower lung atelectatic changes. Bilateral calcified and noncalcified pleural plaques are compatible with prior asbestos exposure. Prominent subcentimeter mediastinal lymph nodes larger than 9/15/2021 and likely reactive. No bulky lymphadenopathy. Enlargement of the central pulmonary arteries compatible with pulmonary arterial hypertension. ABDOMEN/PELVIS: No acute inflammatory abnormality in the abdomen and pelvis. Stable saccular juxtarenal and fusiform infrarenal abdominal aortic aneurysm as detailed in the body of the report. Atherosclerosis. Left renal cyst measuring 2 cm. Assessment/ Plan:    1. Atrial flutter with RVR - patient with history of PAF. Heart rate stabilized and he converted to normal sinus rhythm after multiple IV medications with amiodarone, Cardizem, digoxin. Appreciate Dr. Magda Gonzalez consult who actually came in to see the patient while on vacation. Bedside echo showed preserved LV function with EF estimated 50%. Patient refuses anticoagulation with NOAC as he is concerned about AAA rupturing and bleeding. He does understand risk for strokes during episodes of A. fib with RVR but prefers not to be anticoagulated. Continue on oral Cardizem, Coreg. 2D echo and Bernice Couch possibly on Monday. Continue close monitoring on telemetry bed. 2.  Acute exacerbation of COPD - on IV steroids, Xopenex treatments, inhaled steroids. Also on IV Rocephin for possible bronchitis/pneumonia. CT chest pending  3. Acute on chronic hypoxemic respiratory failure -most likely secondary to A. fib with RVR. Continue on oxygen supplement. 4.  Reported history of neck mass - CT neck pending  5. Generalized weakness -most likely secondary to acute issues.   Will ask PT and OT to evaluate  6. History of AAA about 3.6 cm  7. Chronic anemia           Advanced Care Plan   ( x)  I confirmed that the patient's Advanced Care Plan is present, code status documented, or surrogate decision maker is listed in the patient's medical record. ( )  The patient's advanced care plan is not present because:  (select)   ( ) I confirmed today that the patient does not wish or was not able to name a surrogate decision maker or provide an [address] ( ) Hospice care is currently being provided or has been provided this calender year. ( )  I did not confirm today the presence of an [address] or surrogate decision maker documented within the patient's medical record. (Does not satisfy MIPS performance). Documentation of Current Medications in the Medical Record   (x )  I have utilized all available immediate resources to obtain, update, or review the patient's current medications. If Yes, Stop Here  ( ) The patient is not eligible for medications reconciliation; the patient is in an emergent medical situation where delaying treatment would jeopardize the patient's health. ( ) I did not confirm, update or review the patient's current list of medications today. (does not satisfy MIPS performance)        Medical Necessity: Inpatient admission is appropriate for this patient secondary to the need of IV steroids, IV antibiotics, close monitoring for A. fib with RVR    Estimated length of stay:3 days. The beneficiary may reasonably be expected to be discharged or transferred to a hospital within 96 hours after admission.     DVT prophylaxis:   [x] Lovenox   [] SCDs   [] SQ Heparin   [] Encourage ambulation, low risk for DVT, no chemical or mechanical    prophylaxis necessary      [] Already on Anticoagulation    Anticipated Disposition upon discharge:   [x] Home   [] Home with Home Health   [] [facility]   [] [address]      Electronically signed by Caleb White Jeremi Singletary MD on 1/1/2022 at 10:43 AM

## 2022-01-01 NOTE — PROGRESS NOTES
Pt arrives to room 270 via wc. Ambulates to BR independently. Wife at bedside assisting with admission questions. Nursing assessment complete.  at this time. Oriented to room, whiteboard, staff and call light.

## 2022-01-01 NOTE — CONSULTS
[place] [address]                                  CONSULTATION    PATIENT NAME: Sri Dwyer                      :        1950  MED REC NO:   [id number]                              ROOM:       7796  ACCOUNT NO:   [de-identified]                           ADMIT DATE: 2021  PROVIDER:     Jai Farrell    CONSULT DATE:  2022    REASON FOR CONSULT:  1. Atrial flutter with rapid ventricular response. 2.  Marked shortness of breath. HISTORY OF PRESENT ILLNESS:  The patient is a 77-year-old gentleman who  has a long history of paroxysmal atrial fibrillation. He also has  severe chronic COPD and is on home oxygen at 3.5 liters. In 2015, he had chest pain. He had a stress test which was abnormal.  We did a catheterization on 2015, that showed 30% to 40% disease  in the right coronary artery, 30% left main trunk, unremarkable  circumflex and LAD with an EF of 60%. On 2017, he developed palpitations. An event recorder on  2017, showed intermittent episodes of atrial fibrillation with a  4% burden. He saw a cardiologist at [facility] who recommended  anticoagulation because of his HANSEL score of 3; however, he and his wife  were hesitant to start anticoagulation and saw me for a second opinion. He has a history of small aortic aneurysm measuring 3.6 cm. I agree that he should be anticoagulated, but never did start. On 2021, he came to the emergency room because of heart rate in  the 150 to 160 range with no chest pain or chest discomfort. He came to  the emergency room and was given Cardizem  mg daily. He converted  to sinus rhythm in the ER and was discharged on Cardizem  mg daily  which he did not start. I have not seen him since 03/15/2021. He was evidently doing fairly well until approximately 6 weeks ago.   He  had been developing shortness of breath which markedly worse  approximately a month ago. It has continued to worsen over the last  week. He does have a cough of yellow sputum. He called the [facility],  they did not see him, but sent a home health nurse who has been seeing  him for the last 2 to 3 weeks. He was placed on antibiotics with  Augmentin 875/125 to take b.i.d. for 10 days starting on 12/15 after he  was seen in the emergency room. However, he also began to develop marked increase in weakness. He could  barely walk because of weakness and because of shortness of breath. He  had also begun developing edema and swelling in his abdomen  approximately 2 to 3 weeks ago. He states that he has been having some \"chest tightness. \"  It does seem  to be worse with activity such as trying to walk. However, when he  becomes markedly short of breath, he also develops a chest tightness. He has had intermittent episodes of severe shortness of breath happening  rather suddenly. Because he is on home oxygen at 3.5 liters, they do  have a pulse oximeter. It generally is in the 94 range, however,  occasionally would drop suddenly in the 70 range. At that time, he  would have near syncope episodes and also had some confusion. It would  then return gradually back to the 80s and 90s. He was seen in the emergency room on 12/15. He was COVID negative and  he was placed on Augmentin 875/125 b.i.d. for 10 days. Two nights ago, he had been developing more shortness of breath. He  came to the emergency room. He initially was in sinus rhythm. He came  to the emergency room with an EKG that was normal.  He was having,  however, severe shortness of breath. In the emergency room, he  developed atrial flutter with RVR with heart rate in the 140s. We  attempted to convert him with amiodarone unsuccessfully. We also gave  him Cardizem IV. This would slow his heart rate transiently.   Because  he continued to be in atrial fibrillation, he was admitted for further  treatment. We were able to slow his ventricular response with digoxin along with  Cardizem  mg daily and continuing his Coreg. When I see him this morning, he is resting comfortably. He does still  complain of shortness of breath. He remains in atrial flutter with  controlled ventricular response. He denies any chest pain. He does  have a coarse cough productive of green sputum. He denies any fevers or  sweats. He has had some chills. He has never had a myocardial infarction. He did have a cardiac  catheterization on 08/05/2015. Of note, he has been noted to have a \"mass\" in his lower throat just  above his upper sternum notch. No imaging has been done, but he  evidently has an appointment with an ear, nose, and throat specialist.   He does find this somewhat difficult to swallow. CARDIAC RISK FACTORS:  Known CAD:  Mildly positive. Hypertension:  Positive. Hyperlipidemia:  Positive. Peripheral Vascular Disease:  Positive. Smoking:  Positive. Other Family Members:  Positive. Diabetes:  Negative. MEDICATIONS AT HOME:  I am not sure which medications he is taking at  home. However, the following medications are listed:  1. Lasix 20 mg daily. 2.  Aspirin 81 mg daily. 3.  Deltasone 20 mg daily. 4.  Prilosec 20 mg b.i.d.  5.  Proventil p.r.n.  6.  Flonase p.r.n.  7.  Crestor 20 mg daily. 8.  Oxygen at 3.5 liters. 9.  Coreg 25 mg b.i.d. 10.  Symbicort 2 puffs b.i.d.  11.  Proventil inhaler p.r.n.  12.  Spiriva daily. PAST MEDICAL AND SURGICAL HISTORY:  1. Cardiac as described above. 2.  Hyperlipidemia. 3.  Chronic back pain. 4.  Prostate surgery. 5.  Long history of hypertension. 6.  Renal artery stenosis. 7.  Severe COPD, on oxygen at 3.5 liters at home. 8.  Eye surgery. FAMILY HISTORY:  Mother had cancer. Brother had cancer. Father had  cancer.     SOCIAL HISTORY:  He is 66years old,  for the second time, has  four children. Smokes a half a pack daily. He is a \"retired  contractor. \"  He is followed at the [facility] and seen [facility] in [place]. Does not exercise. Very inactive. REVIEW OF SYSTEMS:  Cardiac as above. Other systems reviewed including  constitutional, eyes, ears, nose and throat, cardiovascular,  respiratory, GI, , musculoskeletal, integumentary, neurologic,  psychiatric, endocrine, hematologic and allergic/immunologic are  negative except for what is described above. No weight loss or weight  gain. No change in bowel habits. No blood in stools. No fevers,  sweats or chills. PHYSICAL EXAMINATION:  VITAL SIGNS:  His blood pressure was 123/62 with a heart rate of 77 and  irregular. Respiratory rate 18. O2 saturation 93% on 3.5 liters. GENERAL:  He is a pleasant 58-year-old gentleman. He had a coarse cough  consisting of yellow sputum. He is oriented to person, place and time. Answered questions appropriately. His wife was present. SKIN:  No unusual skin change. HEENT:  Pupils are equally round and intact. Mucous membranes were dry. NECK:  No JVD. Good carotid pulses. No carotid bruits. No  lymphadenopathy or thyromegaly. CARDIOVASCULAR EXAM:  S1 and S2 were normal.  No S3 or S4. Soft  systolic blowing type murmur. No diastolic murmur. PMI was normal.  No  lift, thrust, or pericardial friction rub. LUNGS:  He had course crackles bilaterally. ABDOMEN:  Protuberant, but soft, nontender. Could not rule out ascites. EXTREMITIES:  Good femoral pulses. Good pedal pulses. He had 2 to 3+  edema. Skin was warm and dry. No calf tenderness. Nail beds pink. Good cap refill. PULSES:  Bilateral symmetrical radial, brachial and carotid pulses. No  carotid bruits. Good femoral and pedal pulses. NEUROLOGIC EXAM:  Within normal limits. PSYCHIATRIC EXAM:  Within normal limits.     LABORATORY DATA:  His sodium was 141, potassium 4.4, BUN 12, creatinine  0.74, GFR greater than 60. His troponin was _____ 6. ProBNP was 708. Troponin was _____ 7. White count 11.9, hemoglobin 10.3 with a platelet  count 276,652. EKG showed atrial flutter with variable AV block at 76  per minute. Repeat EKG showed normal sinus rhythm and was normal.   However, his repeat EKG again after that shows atrial flutter with 2:1  conduction. His current rhythm is atrial flutter with a variable  ventricular response, but is controlled. Chest x-ray showed mild  interstitial prominence, not significant change, likely reflecting  interstitial edema. Bedside echocardiogram appeared to show preserved LV function with an EF  in the 50% range with enlarged right-sided chambers. IMPRESSION:  1.  Marked shortness of breath starting approximately a month ago,  worsening in the past week, question bronchitis and pneumonia with  possible PE.  2.  Near syncopal episodes where his pulse oximetry would drop to 60% to  70%, again question PE. 3.  Long history of paroxysmal atrial fib/flutter with him not wanting  to be anticoagulated with him developing atrial flutter while he was in  the emergency room at 140 beats per minute which is now controlled with  heart rate in the 70s. 4.  Preserved LV function by bedside echocardiogram in the 50% range. 5.  Chest heaviness with activity over the past month where it becomes  dyspneic, question angina or progression of his CAD. 6.  Bronchitis in the last 2 weeks, placed on Augmentin on 12/15, with  him continuing to have thick yellow sputum. 7.  Marked weakness, barely able to walk in the past month, worse in the  last 1 to 2 weeks. 8.  Severe COPD, on home oxygen at 3.5 liters. 9.  Mass in lower neck with an appointment to see ear, nose, and throat  specialist with no imaging as of yet. 10.  Ascites and pedal edema, worse in the last month, possibly  secondary to pulmonary hypertension.     PLAN:  1.  CT of neck, chest, abdomen, and pelvis; rule out PE, define neck  mass and define ascites. 2.  Anticoagulate with NOAC after testing. 3.  Antibiotics for bronchitis per Dr. Pato Rivera. 4.  Echo.  5.  If he does not convert, may cardiovert possibly Sunday or Monday. 6.  Lexiscan stress test probably Monday. DISCUSSION:  He has markedly deteriorated in the last month with marked  weakness, shortness of breath, and fatigue. He had difficulty with  walking even across the room because of weakness. Because of hypoxia, his oxygen had to be increased from 2 to 3.5 liters  per minute. He has also been having episodes of \"near syncopal episode\" where he  becomes markedly short of breath and has O2 saturation in the 60 to 70s  as noted by his wife. He would have some \"chest tightness\" with these  episodes and also with any activity. He came to our emergency room, was initially in sinus rhythm, but  developed atrial flutter with rapid ventricular response which is  difficult to control. However, digoxin added to his Cardizem and Coreg,  his rate is now controlled. He, however, has not converted back to  sinus rhythm as of yet. We will give another bolus of amiodarone at 150  mg. He is in atrial flutter and has not been anticoagulated; however, again,  he was in normal sinus rhythm when he first came to the emergency room. Therefore, I believe it would be safe to cardiovert him if he does not  convert spontaneously. His shortness of breath seems out of proportion to his chest x-ray. I  think he does have bronchitis with his thick yellow sputum; however, we  will rule out a PE also with a CT scan. He is currently on Lovenox. His rate is controlled and again, we will  cardiovert if he does not convert spontaneously. We will define his  neck mass. He will need to be placed on NOAC before discharge. Thank you very much for allowing me the privilege of seeing the patient.   If you have any questions on my thoughts, please do not hesitate to  contact me.         Bina Miller    D: 01/01/2022 8:44:17       T: 01/01/2022 10:43:29     [id number]  Job#: [id number]     Doc#: [id number]    CC:  Lieutenant Soriano

## 2022-01-02 LAB
ABSOLUTE BANDS #: 0.76 K/UL (ref 0–1)
ABSOLUTE EOS #: ABNORMAL K/UL (ref 0–0.4)
ABSOLUTE IMMATURE GRANULOCYTE: ABNORMAL K/UL (ref 0–0.3)
ABSOLUTE LYMPH #: 1.06 K/UL (ref 1–4.8)
ABSOLUTE MONO #: 0.6 K/UL (ref 0–1)
ANION GAP SERPL CALCULATED.3IONS-SCNC: 9 MMOL/L (ref 9–17)
BANDS: 5 % (ref 0–10)
BASOPHILS # BLD: ABNORMAL % (ref 0–2)
BASOPHILS ABSOLUTE: ABNORMAL K/UL (ref 0–0.2)
BUN BLDV-MCNC: 26 MG/DL (ref 8–23)
BUN/CREAT BLD: 32 (ref 9–20)
CALCIUM SERPL-MCNC: 9.6 MG/DL (ref 8.6–10.4)
CHLORIDE BLD-SCNC: 99 MMOL/L (ref 98–107)
CO2: 36 MMOL/L (ref 20–31)
CREAT SERPL-MCNC: 0.82 MG/DL (ref 0.7–1.2)
DIFFERENTIAL TYPE: ABNORMAL
EOSINOPHILS RELATIVE PERCENT: ABNORMAL % (ref 0–5)
GFR AFRICAN AMERICAN: >60 ML/MIN
GFR NON-AFRICAN AMERICAN: >60 ML/MIN
GFR SERPL CREATININE-BSD FRML MDRD: ABNORMAL ML/MIN/{1.73_M2}
GFR SERPL CREATININE-BSD FRML MDRD: ABNORMAL ML/MIN/{1.73_M2}
GLUCOSE BLD-MCNC: 143 MG/DL (ref 70–99)
HCT VFR BLD CALC: 30.5 % (ref 41–53)
HEMOGLOBIN: 10 G/DL (ref 13.5–17.5)
IMMATURE GRANULOCYTES: ABNORMAL %
LYMPHOCYTES # BLD: 7 % (ref 13–44)
MCH RBC QN AUTO: 31.4 PG (ref 26–34)
MCHC RBC AUTO-ENTMCNC: 32.9 G/DL (ref 31–37)
MCV RBC AUTO: 95.3 FL (ref 80–100)
MONOCYTES # BLD: 4 % (ref 5–9)
MORPHOLOGY: ABNORMAL
NRBC AUTOMATED: ABNORMAL PER 100 WBC
PDW BLD-RTO: 14.7 % (ref 12.1–15.2)
PLATELET # BLD: 204 K/UL (ref 140–450)
PLATELET ESTIMATE: ABNORMAL
PMV BLD AUTO: ABNORMAL FL (ref 6–12)
POTASSIUM SERPL-SCNC: 4.6 MMOL/L (ref 3.7–5.3)
RBC # BLD: 3.2 M/UL (ref 4.5–5.9)
RBC # BLD: ABNORMAL 10*6/UL
SEG NEUTROPHILS: 84 % (ref 39–75)
SEGMENTED NEUTROPHILS ABSOLUTE COUNT: 12.68 K/UL (ref 2.1–6.5)
SODIUM BLD-SCNC: 144 MMOL/L (ref 135–144)
WBC # BLD: 15.1 K/UL (ref 3.5–11)
WBC # BLD: ABNORMAL 10*3/UL

## 2022-01-02 PROCEDURE — 6370000000 HC RX 637 (ALT 250 FOR IP): Performed by: INTERNAL MEDICINE

## 2022-01-02 PROCEDURE — 94669 MECHANICAL CHEST WALL OSCILL: CPT

## 2022-01-02 PROCEDURE — 6360000002 HC RX W HCPCS: Performed by: INTERNAL MEDICINE

## 2022-01-02 PROCEDURE — 36415 COLL VENOUS BLD VENIPUNCTURE: CPT

## 2022-01-02 PROCEDURE — 2580000003 HC RX 258: Performed by: INTERNAL MEDICINE

## 2022-01-02 PROCEDURE — 94640 AIRWAY INHALATION TREATMENT: CPT

## 2022-01-02 PROCEDURE — 85025 COMPLETE CBC W/AUTO DIFF WBC: CPT

## 2022-01-02 PROCEDURE — 80048 BASIC METABOLIC PNL TOTAL CA: CPT

## 2022-01-02 PROCEDURE — 94761 N-INVAS EAR/PLS OXIMETRY MLT: CPT

## 2022-01-02 PROCEDURE — 1200000000 HC SEMI PRIVATE

## 2022-01-02 PROCEDURE — 2700000000 HC OXYGEN THERAPY PER DAY

## 2022-01-02 RX ORDER — GUAIFENESIN 600 MG/1
600 TABLET, EXTENDED RELEASE ORAL 2 TIMES DAILY
Status: DISCONTINUED | OUTPATIENT
Start: 2022-01-02 | End: 2022-01-07 | Stop reason: HOSPADM

## 2022-01-02 RX ADMIN — IPRATROPIUM BROMIDE 0.5 MG: 0.5 SOLUTION RESPIRATORY (INHALATION) at 09:17

## 2022-01-02 RX ADMIN — IPRATROPIUM BROMIDE 0.5 MG: 0.5 SOLUTION RESPIRATORY (INHALATION) at 16:54

## 2022-01-02 RX ADMIN — SODIUM CHLORIDE, PRESERVATIVE FREE 10 ML: 5 INJECTION INTRAVENOUS at 20:34

## 2022-01-02 RX ADMIN — CEFTRIAXONE SODIUM 1000 MG: 1 INJECTION, POWDER, FOR SOLUTION INTRAMUSCULAR; INTRAVENOUS at 23:33

## 2022-01-02 RX ADMIN — DILTIAZEM HYDROCHLORIDE 240 MG: 120 CAPSULE, COATED, EXTENDED RELEASE ORAL at 08:42

## 2022-01-02 RX ADMIN — IPRATROPIUM BROMIDE 0.5 MG: 0.5 SOLUTION RESPIRATORY (INHALATION) at 21:04

## 2022-01-02 RX ADMIN — PANTOPRAZOLE SODIUM 40 MG: 40 TABLET, DELAYED RELEASE ORAL at 07:00

## 2022-01-02 RX ADMIN — NYSTATIN 500000 UNITS: 100000 SUSPENSION ORAL at 13:57

## 2022-01-02 RX ADMIN — LEVALBUTEROL HYDROCHLORIDE 1.25 MG: 1.25 SOLUTION RESPIRATORY (INHALATION) at 05:30

## 2022-01-02 RX ADMIN — ENOXAPARIN SODIUM 40 MG: 100 INJECTION SUBCUTANEOUS at 08:42

## 2022-01-02 RX ADMIN — CARVEDILOL 25 MG: 12.5 TABLET, FILM COATED ORAL at 08:42

## 2022-01-02 RX ADMIN — BUDESONIDE AND FORMOTEROL FUMARATE DIHYDRATE 2 PUFF: 160; 4.5 AEROSOL RESPIRATORY (INHALATION) at 21:04

## 2022-01-02 RX ADMIN — LEVALBUTEROL HYDROCHLORIDE 1.25 MG: 1.25 SOLUTION RESPIRATORY (INHALATION) at 09:17

## 2022-01-02 RX ADMIN — FUROSEMIDE 20 MG: 20 TABLET ORAL at 08:42

## 2022-01-02 RX ADMIN — FLUTICASONE PROPIONATE 1 SPRAY: 50 SPRAY, METERED NASAL at 08:50

## 2022-01-02 RX ADMIN — IPRATROPIUM BROMIDE 0.5 MG: 0.5 SOLUTION RESPIRATORY (INHALATION) at 05:30

## 2022-01-02 RX ADMIN — SODIUM CHLORIDE, PRESERVATIVE FREE 10 ML: 5 INJECTION INTRAVENOUS at 08:42

## 2022-01-02 RX ADMIN — GUAIFENESIN 600 MG: 600 TABLET ORAL at 20:34

## 2022-01-02 RX ADMIN — METHYLPREDNISOLONE SODIUM SUCCINATE 40 MG: 40 INJECTION, POWDER, FOR SOLUTION INTRAMUSCULAR; INTRAVENOUS at 13:57

## 2022-01-02 RX ADMIN — NYSTATIN 500000 UNITS: 100000 SUSPENSION ORAL at 20:33

## 2022-01-02 RX ADMIN — DILTIAZEM HYDROCHLORIDE 240 MG: 120 CAPSULE, COATED, EXTENDED RELEASE ORAL at 20:34

## 2022-01-02 RX ADMIN — LEVALBUTEROL HYDROCHLORIDE 1.25 MG: 1.25 SOLUTION RESPIRATORY (INHALATION) at 13:25

## 2022-01-02 RX ADMIN — LEVALBUTEROL HYDROCHLORIDE 1.25 MG: 1.25 SOLUTION RESPIRATORY (INHALATION) at 16:54

## 2022-01-02 RX ADMIN — NYSTATIN 500000 UNITS: 100000 SUSPENSION ORAL at 17:59

## 2022-01-02 RX ADMIN — CARVEDILOL 25 MG: 12.5 TABLET, FILM COATED ORAL at 17:59

## 2022-01-02 RX ADMIN — ASPIRIN 81 MG: 81 TABLET, COATED ORAL at 08:42

## 2022-01-02 RX ADMIN — IPRATROPIUM BROMIDE 0.5 MG: 0.5 SOLUTION RESPIRATORY (INHALATION) at 13:25

## 2022-01-02 RX ADMIN — BUDESONIDE AND FORMOTEROL FUMARATE DIHYDRATE 2 PUFF: 160; 4.5 AEROSOL RESPIRATORY (INHALATION) at 09:25

## 2022-01-02 RX ADMIN — ROSUVASTATIN CALCIUM 20 MG: 20 TABLET, COATED ORAL at 20:34

## 2022-01-02 RX ADMIN — DIGOXIN 125 MCG: 250 INJECTION, SOLUTION INTRAMUSCULAR; INTRAVENOUS; PARENTERAL at 08:57

## 2022-01-02 RX ADMIN — GUAIFENESIN 600 MG: 600 TABLET ORAL at 13:57

## 2022-01-02 RX ADMIN — METHYLPREDNISOLONE SODIUM SUCCINATE 40 MG: 40 INJECTION, POWDER, FOR SOLUTION INTRAMUSCULAR; INTRAVENOUS at 23:30

## 2022-01-02 RX ADMIN — LEVALBUTEROL HYDROCHLORIDE 1.25 MG: 1.25 SOLUTION RESPIRATORY (INHALATION) at 21:04

## 2022-01-02 NOTE — PROGRESS NOTES
Hospitalist Progress Note  1/2/2022 10:20 AM  Subjective:   Admit Date: 12/31/2021  PCP: Alisson Collado,     Interval History:    Patient complains of wheezing, shortness of breath, unable to bring up secretions. Also complains of soreness in his mouth. Reports no chest pain, no palpitations. Heart rate remained stable, in NSR    Diet: ADULT DIET; Regular  Medications:   Scheduled Meds:   guaiFENesin  600 mg Oral BID    nystatin  5 mL Oral 4x Daily    amiodarone bolus  150 mg IntraVENous Once    aspirin  81 mg Oral Daily    budesonide-formoterol  2 puff Inhalation BID    carvedilol  25 mg Oral BID WC    fluticasone  1 spray Nasal Daily    furosemide  20 mg Oral Daily    pantoprazole  40 mg Oral QAM AC    rosuvastatin  20 mg Oral Daily    levalbuterol  1.25 mg Nebulization Q4H WA    methylPREDNISolone  40 mg IntraVENous Q12H    sodium chloride flush  5-40 mL IntraVENous 2 times per day    enoxaparin  40 mg SubCUTAneous Daily    ipratropium  0.5 mg Nebulization Q4H WA    cefTRIAXone (ROCEPHIN) IV  1,000 mg IntraVENous Q24H    dilTIAZem  240 mg Oral BID    digoxin  125 mcg IntraVENous Daily     Continuous Infusions:   sodium chloride       PRN Medications: sodium chloride flush, sodium chloride, ondansetron **OR** ondansetron, polyethylene glycol, acetaminophen **OR** acetaminophen    Objective:   Vitals: /64   Pulse 68   Temp 97.9 °F (36.6 °C) (Temporal)   Resp 20   Ht 5' 10\" (1.778 m)   Wt 178 lb 8 oz (81 kg)   SpO2 93%   BMI 25.61 kg/m²   BMI: Body mass index is 25.61 kg/m².     CBC:   Recent Labs     12/31/21  1101 01/01/22  0519 01/02/22  0512   WBC 11.9* 7.7 15.1*   HGB 10.3* 10.6* 10.0*    191 204     BMP:    Recent Labs     12/31/21  1852 01/01/22  0519 01/02/22  0512    141 144   K 4.7 4.4 4.6   CL 94* 97* 99   CO2 38* 35* 36*   BUN 11 12 26*   CREATININE 0.76 0.74 0.82   GLUCOSE 196* 159* 143*       Physical Exam:    General Appearance: alert and oriented to person, place and time, in no acute distress  Cardiovascular: normal rate, regular rhythm, normal S1 and S2, no murmurs, rubs, clicks, or gallops, distal pulses intact  Pulmonary/Chest: Diffuse bilateral wheezes, rhonchi, diminished air movement, no respiratory distress  Abdomen: soft, non-tender, non-distended, normal bowel sounds   Extremities: Bilateral lower extremities with 2+ pitting edema  Skin: warm and dry, no rash or erythema  Neurological: alert, oriented, normal speech, no focal findings or movement disorder noted      Assessment and Plan:       1. Atrial flutter with RVR -patient remains in  normal sinus rhythm after multiple IV medications with amiodarone, Cardizem, digoxin. Appreciate Dr. Hamilton Redd consult who actually came in to see the patient while on vacation. Bedside echo showed preserved LV function with EF estimated 50%. Patient refuses anticoagulation with NOAC as he is concerned about AAA rupturing and bleeding. He does understand risk for strokes during episodes of A. fib with RVR but prefers not to be anticoagulated. Continue on oral Cardizem, Coreg. 2D echo and possibly Lexiscan on Monday. Continue close monitoring on telemetry bed. 2.  Acute exacerbation of COPD - on IV steroids, Xopenex treatments, inhaled steroids. Also on IV Rocephin for possible bronchitis/pneumonia. We will add Mucinex, Acapella, EZ Pap. CT chest revealed no PE, RLL consolidation nonspecific may represent atelectasis, pneumonia or aspiration. 3.  Acute on chronic hypoxemic respiratory failure -most likely secondary to A. fib with RVR. Continue on oxygen supplement. At baseline oxygen requirement. 4.  Reported history of neck mass - CT neck revealed no abnormalities  5. Generalized weakness -most likely secondary to acute issues. Will ask PT and OT to evaluate  6.   History of AAA  -CT abdomen/pelvis showed fusiform infrarenal abdominal aortic aneurysm 3.9 x 3.5 cm, not much changed from 4/15/2021.   Another aneurysm superiorly measuring 3.7 x 2.8 cm also containing moderate amount of thrombus not changed from 4/15/2021.  7.  Chronic anemia        Patient continues to require inpatient admission related to need of IV steroids, IV antibiotics, close monitoring for A. fib     Electronically signed by Tammy Chacko MD on 1/2/2022 at 10:20 AM    Rounding Hospitalist

## 2022-01-02 NOTE — PROGRESS NOTES
Pt resting in bed. Denies any pain or needs at this time. Remains on 3.5L NC. Pt has been NSR on tele throughout night. Call light in reach. Bed alarm on.

## 2022-01-03 LAB
ABSOLUTE EOS #: 0 K/UL (ref 0–0.4)
ABSOLUTE IMMATURE GRANULOCYTE: ABNORMAL K/UL (ref 0–0.3)
ABSOLUTE LYMPH #: 0.41 K/UL (ref 1–4.8)
ABSOLUTE MONO #: 0.27 K/UL (ref 0–1)
ANION GAP SERPL CALCULATED.3IONS-SCNC: 8 MMOL/L (ref 9–17)
BASOPHILS # BLD: 0 % (ref 0–2)
BASOPHILS ABSOLUTE: 0 K/UL (ref 0–0.2)
BUN BLDV-MCNC: 30 MG/DL (ref 8–23)
BUN/CREAT BLD: 35 (ref 9–20)
CALCIUM SERPL-MCNC: 9.4 MG/DL (ref 8.6–10.4)
CHLORIDE BLD-SCNC: 99 MMOL/L (ref 98–107)
CO2: 36 MMOL/L (ref 20–31)
CREAT SERPL-MCNC: 0.85 MG/DL (ref 0.7–1.2)
DIFFERENTIAL TYPE: ABNORMAL
EOSINOPHILS RELATIVE PERCENT: 0 % (ref 0–5)
GFR AFRICAN AMERICAN: >60 ML/MIN
GFR NON-AFRICAN AMERICAN: >60 ML/MIN
GFR SERPL CREATININE-BSD FRML MDRD: ABNORMAL ML/MIN/{1.73_M2}
GFR SERPL CREATININE-BSD FRML MDRD: ABNORMAL ML/MIN/{1.73_M2}
GLUCOSE BLD-MCNC: 142 MG/DL (ref 70–99)
HCT VFR BLD CALC: 32.5 % (ref 41–53)
HEMOGLOBIN: 10.3 G/DL (ref 13.5–17.5)
IMMATURE GRANULOCYTES: ABNORMAL %
LV EF: 55 %
LVEF MODALITY: NORMAL
LYMPHOCYTES # BLD: 3 % (ref 13–44)
MCH RBC QN AUTO: 28.5 PG (ref 26–34)
MCHC RBC AUTO-ENTMCNC: 31.6 G/DL (ref 31–37)
MCV RBC AUTO: 90.2 FL (ref 80–100)
MONOCYTES # BLD: 2 % (ref 5–9)
MORPHOLOGY: ABNORMAL
NRBC AUTOMATED: ABNORMAL PER 100 WBC
PDW BLD-RTO: 15.4 % (ref 12.1–15.2)
PLATELET # BLD: 206 K/UL (ref 140–450)
PLATELET ESTIMATE: ABNORMAL
PMV BLD AUTO: ABNORMAL FL (ref 6–12)
POTASSIUM SERPL-SCNC: 4.4 MMOL/L (ref 3.7–5.3)
RBC # BLD: 3.61 M/UL (ref 4.5–5.9)
RBC # BLD: ABNORMAL 10*6/UL
SEG NEUTROPHILS: 95 % (ref 39–75)
SEGMENTED NEUTROPHILS ABSOLUTE COUNT: 12.82 K/UL (ref 2.1–6.5)
SODIUM BLD-SCNC: 143 MMOL/L (ref 135–144)
WBC # BLD: 13.5 K/UL (ref 3.5–11)
WBC # BLD: ABNORMAL 10*3/UL

## 2022-01-03 PROCEDURE — 6370000000 HC RX 637 (ALT 250 FOR IP): Performed by: INTERNAL MEDICINE

## 2022-01-03 PROCEDURE — 2580000003 HC RX 258: Performed by: INTERNAL MEDICINE

## 2022-01-03 PROCEDURE — 6360000002 HC RX W HCPCS: Performed by: INTERNAL MEDICINE

## 2022-01-03 PROCEDURE — 85025 COMPLETE CBC W/AUTO DIFF WBC: CPT

## 2022-01-03 PROCEDURE — 94640 AIRWAY INHALATION TREATMENT: CPT

## 2022-01-03 PROCEDURE — 94761 N-INVAS EAR/PLS OXIMETRY MLT: CPT

## 2022-01-03 PROCEDURE — 2700000000 HC OXYGEN THERAPY PER DAY

## 2022-01-03 PROCEDURE — 6370000000 HC RX 637 (ALT 250 FOR IP)

## 2022-01-03 PROCEDURE — 99231 SBSQ HOSP IP/OBS SF/LOW 25: CPT | Performed by: INTERNAL MEDICINE

## 2022-01-03 PROCEDURE — 1200000000 HC SEMI PRIVATE

## 2022-01-03 PROCEDURE — 93005 ELECTROCARDIOGRAM TRACING: CPT | Performed by: INTERNAL MEDICINE

## 2022-01-03 PROCEDURE — 80048 BASIC METABOLIC PNL TOTAL CA: CPT

## 2022-01-03 PROCEDURE — 36415 COLL VENOUS BLD VENIPUNCTURE: CPT

## 2022-01-03 PROCEDURE — 94669 MECHANICAL CHEST WALL OSCILL: CPT

## 2022-01-03 RX ORDER — DILTIAZEM HYDROCHLORIDE 180 MG/1
180 CAPSULE, COATED, EXTENDED RELEASE ORAL 2 TIMES DAILY
Status: DISCONTINUED | OUTPATIENT
Start: 2022-01-03 | End: 2022-01-05

## 2022-01-03 RX ORDER — DIGOXIN 125 MCG
125 TABLET ORAL DAILY
Status: DISCONTINUED | OUTPATIENT
Start: 2022-01-03 | End: 2022-01-03

## 2022-01-03 RX ORDER — DIGOXIN 125 MCG
TABLET ORAL
Status: COMPLETED
Start: 2022-01-03 | End: 2022-01-03

## 2022-01-03 RX ORDER — METHYLPREDNISOLONE SODIUM SUCCINATE 40 MG/ML
40 INJECTION, POWDER, LYOPHILIZED, FOR SOLUTION INTRAMUSCULAR; INTRAVENOUS EVERY 8 HOURS
Status: DISCONTINUED | OUTPATIENT
Start: 2022-01-03 | End: 2022-01-07 | Stop reason: HOSPADM

## 2022-01-03 RX ORDER — AMIODARONE HYDROCHLORIDE 200 MG/1
TABLET ORAL
Status: COMPLETED
Start: 2022-01-03 | End: 2022-01-03

## 2022-01-03 RX ORDER — SPIRONOLACTONE 25 MG/1
25 TABLET ORAL DAILY
Status: DISCONTINUED | OUTPATIENT
Start: 2022-01-03 | End: 2022-01-07 | Stop reason: HOSPADM

## 2022-01-03 RX ORDER — AMIODARONE HYDROCHLORIDE 200 MG/1
200 TABLET ORAL 2 TIMES DAILY
Status: DISCONTINUED | OUTPATIENT
Start: 2022-01-03 | End: 2022-01-06

## 2022-01-03 RX ADMIN — ASPIRIN 81 MG: 81 TABLET, COATED ORAL at 07:48

## 2022-01-03 RX ADMIN — LEVALBUTEROL HYDROCHLORIDE 1.25 MG: 1.25 SOLUTION RESPIRATORY (INHALATION) at 21:30

## 2022-01-03 RX ADMIN — ROSUVASTATIN CALCIUM 20 MG: 20 TABLET, COATED ORAL at 20:19

## 2022-01-03 RX ADMIN — IPRATROPIUM BROMIDE 0.5 MG: 0.5 SOLUTION RESPIRATORY (INHALATION) at 21:29

## 2022-01-03 RX ADMIN — NYSTATIN 500000 UNITS: 100000 SUSPENSION ORAL at 20:19

## 2022-01-03 RX ADMIN — AMIODARONE HYDROCHLORIDE 200 MG: 200 TABLET ORAL at 12:36

## 2022-01-03 RX ADMIN — DILTIAZEM HYDROCHLORIDE 240 MG: 120 CAPSULE, COATED, EXTENDED RELEASE ORAL at 07:48

## 2022-01-03 RX ADMIN — BUDESONIDE AND FORMOTEROL FUMARATE DIHYDRATE 2 PUFF: 160; 4.5 AEROSOL RESPIRATORY (INHALATION) at 09:21

## 2022-01-03 RX ADMIN — NYSTATIN 500000 UNITS: 100000 SUSPENSION ORAL at 18:02

## 2022-01-03 RX ADMIN — GUAIFENESIN 600 MG: 600 TABLET ORAL at 07:49

## 2022-01-03 RX ADMIN — NYSTATIN 500000 UNITS: 100000 SUSPENSION ORAL at 07:48

## 2022-01-03 RX ADMIN — AMIODARONE HYDROCHLORIDE 200 MG: 200 TABLET ORAL at 20:19

## 2022-01-03 RX ADMIN — PANTOPRAZOLE SODIUM 40 MG: 40 TABLET, DELAYED RELEASE ORAL at 05:34

## 2022-01-03 RX ADMIN — SODIUM CHLORIDE, PRESERVATIVE FREE 10 ML: 5 INJECTION INTRAVENOUS at 20:19

## 2022-01-03 RX ADMIN — GUAIFENESIN 600 MG: 600 TABLET ORAL at 20:19

## 2022-01-03 RX ADMIN — METHYLPREDNISOLONE SODIUM SUCCINATE 40 MG: 40 INJECTION, POWDER, FOR SOLUTION INTRAMUSCULAR; INTRAVENOUS at 07:49

## 2022-01-03 RX ADMIN — CEFTRIAXONE SODIUM 1000 MG: 1 INJECTION, POWDER, FOR SOLUTION INTRAMUSCULAR; INTRAVENOUS at 22:57

## 2022-01-03 RX ADMIN — FUROSEMIDE 20 MG: 20 TABLET ORAL at 07:48

## 2022-01-03 RX ADMIN — LEVALBUTEROL HYDROCHLORIDE 1.25 MG: 1.25 SOLUTION RESPIRATORY (INHALATION) at 14:48

## 2022-01-03 RX ADMIN — METHYLPREDNISOLONE SODIUM SUCCINATE 40 MG: 40 INJECTION, POWDER, FOR SOLUTION INTRAMUSCULAR; INTRAVENOUS at 22:55

## 2022-01-03 RX ADMIN — LEVALBUTEROL HYDROCHLORIDE 1.25 MG: 1.25 SOLUTION RESPIRATORY (INHALATION) at 09:22

## 2022-01-03 RX ADMIN — IPRATROPIUM BROMIDE 0.5 MG: 0.5 SOLUTION RESPIRATORY (INHALATION) at 14:48

## 2022-01-03 RX ADMIN — BUDESONIDE AND FORMOTEROL FUMARATE DIHYDRATE 2 PUFF: 160; 4.5 AEROSOL RESPIRATORY (INHALATION) at 21:30

## 2022-01-03 RX ADMIN — LEVALBUTEROL HYDROCHLORIDE 1.25 MG: 1.25 SOLUTION RESPIRATORY (INHALATION) at 05:35

## 2022-01-03 RX ADMIN — Medication 125 MCG: at 07:50

## 2022-01-03 RX ADMIN — DIGOXIN 125 MCG: 125 TABLET ORAL at 07:50

## 2022-01-03 RX ADMIN — LEVALBUTEROL HYDROCHLORIDE 1.25 MG: 1.25 SOLUTION RESPIRATORY (INHALATION) at 16:59

## 2022-01-03 RX ADMIN — SODIUM CHLORIDE, PRESERVATIVE FREE 10 ML: 5 INJECTION INTRAVENOUS at 07:50

## 2022-01-03 RX ADMIN — SPIRONOLACTONE 25 MG: 25 TABLET, FILM COATED ORAL at 10:29

## 2022-01-03 RX ADMIN — CARVEDILOL 25 MG: 12.5 TABLET, FILM COATED ORAL at 18:02

## 2022-01-03 RX ADMIN — AZITHROMYCIN MONOHYDRATE 500 MG: 500 INJECTION, POWDER, LYOPHILIZED, FOR SOLUTION INTRAVENOUS at 05:29

## 2022-01-03 RX ADMIN — ENOXAPARIN SODIUM 40 MG: 100 INJECTION SUBCUTANEOUS at 07:48

## 2022-01-03 RX ADMIN — IPRATROPIUM BROMIDE 0.5 MG: 0.5 SOLUTION RESPIRATORY (INHALATION) at 09:22

## 2022-01-03 RX ADMIN — DILTIAZEM HYDROCHLORIDE 180 MG: 180 CAPSULE, COATED, EXTENDED RELEASE ORAL at 20:19

## 2022-01-03 RX ADMIN — IPRATROPIUM BROMIDE 0.5 MG: 0.5 SOLUTION RESPIRATORY (INHALATION) at 05:35

## 2022-01-03 RX ADMIN — CARVEDILOL 25 MG: 12.5 TABLET, FILM COATED ORAL at 07:49

## 2022-01-03 RX ADMIN — NYSTATIN 500000 UNITS: 100000 SUSPENSION ORAL at 12:37

## 2022-01-03 RX ADMIN — FLUTICASONE PROPIONATE 1 SPRAY: 50 SPRAY, METERED NASAL at 07:51

## 2022-01-03 RX ADMIN — METHYLPREDNISOLONE SODIUM SUCCINATE 40 MG: 40 INJECTION, POWDER, FOR SOLUTION INTRAMUSCULAR; INTRAVENOUS at 15:49

## 2022-01-03 RX ADMIN — SODIUM CHLORIDE, PRESERVATIVE FREE 10 ML: 5 INJECTION INTRAVENOUS at 15:49

## 2022-01-03 RX ADMIN — IPRATROPIUM BROMIDE 0.5 MG: 0.5 SOLUTION RESPIRATORY (INHALATION) at 16:59

## 2022-01-03 ASSESSMENT — PAIN SCALES - GENERAL: PAINLEVEL_OUTOF10: 0

## 2022-01-03 NOTE — PROGRESS NOTES
Quality flow rounds held on 1/3/22     Cameron Nunez is admitted for  Atrial fibrillation with RVR ([address] Length of stay 3. Education:    Needed Education: Atrial fibrillation, meds, follow up, diet      Do you have any questions regarding your plan of care while at the hospital? denies    Planned Disposition:               [x]  Home when able                [] Swing Bed                [] ECF/SNF               [] Other/TBD    Barriers to Discharge:    Can you [facility] your [facility]? [facility]   Do you have transportation to follow up appointments? Wife drives   Do you need any new equipment at home? none   Current equipment includes   ramp, w/c, walker, oxygen, shower chair, grab bars in bathroom, walk in shower. Do you have a living will or durable power of  for healthcare? denies               If yes do we have a copy on file? n/a    Do you or your family have any questions or concerns we haven't already discussed? Denies    Lives with wife, [facility], wife voices interest in using [address] and states she has contacted the [place] for them to set this up. Denies additional needs at this time. Interested in having therapy and nursing for [address] services. Selene Zimmer and writer present for rounding.

## 2022-01-03 NOTE — PLAN OF CARE
Problem: SAFETY  Goal: Free from accidental physical injury  Outcome: Met This Shift  Goal: Free from intentional harm  Outcome: Met This Shift     Problem: DAILY CARE  Goal: Daily care needs are met  Outcome: Met This Shift     Problem: PAIN  Goal: Patient's pain/discomfort is manageable  Outcome: Met This Shift     Problem: Nutrition  Goal: Optimal nutrition therapy  1/3/2022 0828 by Laila Calvin, RD, LD  Outcome: Ongoing  Note: Nutrition Problem #1: Altered nutrition-related lab values  Intervention: Food and/or Nutrient Delivery: Continue Current Diet  Nutritional Goals: PO >75% meals with slow/methodical PO intakes.   Encourage mindful eating

## 2022-01-03 NOTE — PROGRESS NOTES
Cardiology    He redeveloped atrial fibrillation with a controlled ventricular response. Will start Amiodarone 200 mg bid.     Thanks, Whit Barker MD

## 2022-01-03 NOTE — PROGRESS NOTES
SW met with pt and spouse to complete assessment with RN case manager during quality rounds. Pt is alert and oriented and pleasant with assessment. Pt let's his wife do most of the talking and adds in when he feels necessary. Pt lives with his spouse in their home in [place]. Pt has home oxygen, ramp, walker, shower chair, wheelchair, grab bars at home to use. Pt is receiving a home care team RN and PT that visit infrequently through the [place] per spouse. Spouse has called the [facility] this morning to request actual [place] services and is awaiting return call. SW will assist as needed in setting up [place] services at discharge. Pt's wife transports him to local appointments and pt uses [address] to [facility] [place] appointments. Pt is a full code and follows with Dr Jackie Santos at the [place] clinic as PCP. Pt also sees Dr Radha Schumacher for cardiology and Dr Gutierrez for pulmonology at [facility]. Pt does not have advance directives and spouse requests packets and SW provided these. Pt and spouse wish to take them home and review and will return as needed for assistance. Pt states that he would want wife to make decisions if he is unable. Pt receives medications through the [place] and they are affordable this way. Pt and spouse intend for pt to return home at discharge. Spouse refuses any type of placement and states that they will manage at home with [place]. SW left a skilled [place] listing with pt as well and will follow up with pt and spouse tomorrow to see if an agency needs to be arranged or if [facility] is taking care of this for them. SW will follow and remain available.  Remberto SOLISW 1/3/2022

## 2022-01-03 NOTE — PROGRESS NOTES
Cardiology    He has remained in NSR. Am planning on doing Lexiscan before discharge. Will hold off at this time because of his pulmonary status including wheezing. Echo pending today. Have added Aldactone 25 mg daily.     Harper Jaime MD

## 2022-01-03 NOTE — PROGRESS NOTES
Telemetry shows rate controlled atrial flutter rhythm in the 60's-70's. Dr. Lester Ingram notified. New orders obtained for amiodarone. See eMAR.

## 2022-01-03 NOTE — PROGRESS NOTES
Hospitalist Progress Note  1/3/2022 3:56 AM  Subjective:   Admit Date: 12/31/2021  PCP: Nica Gaona DO    Interval History: Laureen Farr states he continues to have a lot of wheezing and cough. The cough is productive on occasion. He feels his breathing is a little better. No chest pain. Appetite is okay, no nausea. No trouble urinating. Diet: ADULT DIET; Regular  Medications:   Scheduled Meds:   methylPREDNISolone  40 mg IntraVENous Q8H    azithromycin  500 mg IntraVENous Q24H    guaiFENesin  600 mg Oral BID    nystatin  5 mL Oral 4x Daily    amiodarone bolus  150 mg IntraVENous Once    aspirin  81 mg Oral Daily    budesonide-formoterol  2 puff Inhalation BID    carvedilol  25 mg Oral BID WC    fluticasone  1 spray Nasal Daily    furosemide  20 mg Oral Daily    pantoprazole  40 mg Oral QAM AC    rosuvastatin  20 mg Oral Daily    levalbuterol  1.25 mg Nebulization Q4H WA    sodium chloride flush  5-40 mL IntraVENous 2 times per day    enoxaparin  40 mg SubCUTAneous Daily    ipratropium  0.5 mg Nebulization Q4H WA    cefTRIAXone (ROCEPHIN) IV  1,000 mg IntraVENous Q24H    dilTIAZem  240 mg Oral BID    digoxin  125 mcg IntraVENous Daily     Continuous Infusions:   sodium chloride         Patient's current medications documented, reviewed, and updated. CBC:   Recent Labs     12/31/21  1101 01/01/22  0519 01/02/22  0512   WBC 11.9* 7.7 15.1*   HGB 10.3* 10.6* 10.0*    191 204     BMP:    Recent Labs     12/31/21  1852 01/01/22  0519 01/02/22  0512    141 144   K 4.7 4.4 4.6   CL 94* 97* 99   CO2 38* 35* 36*   BUN 11 12 26*   CREATININE 0.76 0.74 0.82   GLUCOSE 196* 159* 143*     Hepatic: No results for input(s): AST, ALT, ALB, BILITOT, ALKPHOS in the last 72 hours. Troponin: No results for input(s): TROPONINI in the last 72 hours. BNP: No results for input(s): BNP in the last 72 hours. Lipids: No results for input(s): CHOL, HDL in the last 72 hours.     Invalid input(s): LDLCALCU  INR: No results for input(s): INR in the last 72 hours. Objective:   Vitals: /73   Pulse 68   Temp 98.4 °F (36.9 °C) (Oral)   Resp 22   Ht 5' 10\" (1.778 m)   Wt 178 lb 8 oz (81 kg)   SpO2 94%   BMI 25.61 kg/m²   General appearance: alert and cooperative with exam  HEENT: Head: Normocephalic, no lesions, without obvious abnormality. Eye: Normal external eye, conjunctiva, lids cornea, GENTE. Nose: Normal external nose, mucus membranes and septum. Nose: Nasal oxygen on. Neck: no adenopathy and supple, symmetrical, trachea midline  Lungs: Bilateral expiratory wheezing. Heart: regular rate and rhythm, S1, S2 normal and difficult to appreciate secondary to wheezing. Abdomen: soft, non-tender; bowel sounds normal; no masses,  no organomegaly  Extremities: 2 + LE edema. Neurologic: Mental status: Alert, oriented, thought content appropriate    Assessment and Plan:   1. Atrial fib with RVR - cardioverted with Amiodarone, Cardizem, and Digoxin. Refuses NOAC secondary to worries of AAA rupture. Continues on Cardizem and Coreg. ECHO ordered for today. 2.  COPD exacerbation - on IV Solumedrol and Xopenex treatments. On Mucinex, Acapella, and EZPAP. CT chest revealed no PE, RLL consolidation nonspecific may represent atelectasis, pneumonia or aspiration. Started on IV Rocephin upon admission. 3.  Acute on chronic hypoxic respiratory failure - back to baseline nasal oxygen at 3.5 l/m which he uses at home. 4.  H/O AAA - CT abdomen/pelvis showed fusiform infrarenal abdominal aortic aneurysm 3.9 x 3.5 cm, not much changed from 4/15/2021. Another aneurysm superiorly measuring 3.7 x 2.8 cm also containing moderate amount of thrombus not changed from 4/15/2021.    5.  Chronic anemia - stable. 6.  H/O neck mass - no abnormality seen on CT scan of the neck. 7.  Weakness - PT / OT ordered. Plan:  1. Add Zithromax 500 mg IV daily. 2.  ACE wrap legs to decrease edema.   3. ECHO today. 4.  Increase Solumedrol to every 8 hours. Patient continues to require in patient admission secondary to the need for IV antibiotics, IV steroids, and cardiopulmonary monitoring. DVT prophylaxis:   [x] Lovenox   [] SCDs   [] SQ Heparin   [] Encourage ambulation, low risk for DVT, no chemical or mechanical    prophylaxis necessary      [] Already on Anticoagulation    Patient Active Problem List:     COPD     Pure hypercholesterolemia     Osteoarthritis     Chest pain     Essential hypertension     Positive FIT (fecal immunochemical test)     Aneurysm of suprarenal aorta (HCC)     Paroxysmal atrial fibrillation (HCC)     Atrial fibrillation with RVR ([facility] [address]      Documentation of the Current Medications in the Medical Record    (x)  I have utilized all available immediate resources to obtain, update, or review the patient's current medications. (Satisfies MIPS Performance)  If Yes, Stop Here  ( )  The patient is not eligible for medication reconciliation; the patient is in an emergent medical situation where delaying treatment would jeopardize the patient's health. (MIPS Performance exception / exclusion)  ( )  I did not confirm, update or review the patient's current list of medications today. (Does not satisfy MIPS Performance)    Advanced Care Plan    (x)  I confirmed that the patient's Advanced Care Plan is present, code status documented, or surrogate decision maker is listed in the patient's medical record. ( )  The patient's advanced care plan is not present because:  (select)   ( ) I confirmed today that the patient does not wish or was not able to name a surrogate decision maker or provide an [address] ( ) Hospice care is currently being provided or has been provided this calender year. ( )  I did not confirm today the presence of an [address] or surrogate decision maker documented within the patient's medical record. (Does not satisfy MIPS performance). Jennifer Suggs MD, MD  Rounding Hospitalist

## 2022-01-03 NOTE — PROGRESS NOTES
Comprehensive Nutrition Assessment    Type and Reason for Visit:  Initial,Positive Nutrition Screen (difficulty chewing/swallowing)    Nutrition Recommendations/Plan: Encourage oral intakes    Nutrition Assessment:  Altered nutrition related lab values, r/t endocrine dysfunction, AEB hyperglycemia in presence of steroid. Difficulty chewing/swallowing identified on admission screening, taking a regular diet well. States he has something in his throat that sometimes makes him cough with foods. He does also note that this happens when he is NOT eating very mindfully, and hurried. Seems able to compensate. No diet restrictions at home. Denies diet questions or concerns. Malnutrition Assessment:  Malnutrition Status:  No malnutrition    Context:  Acute Illness     Findings of the 6 clinical characteristics of malnutrition:  Energy Intake:  No significant decrease in energy intake  Weight Loss:  No significant weight loss     Body Fat Loss:  No significant body fat loss     Muscle Mass Loss:  No significant muscle mass loss    Fluid Accumulation:  1 - Mild Extremities   Strength:  Not Performed    Estimated Daily Nutrient Needs:  Energy (kcal):  3840-4674 (18-22); Weight Used for Energy Requirements:  Current     Protein (g):  90-98 (1.2-1.3); Weight Used for Protein Requirements:  Ideal        Fluid (ml/day):  1800; Method Used for Fluid Requirements:  1 ml/kcal      Nutrition Related Findings:  1+ BLE edema      Wounds:  None       Current Nutrition Therapies:    ADULT DIET; Regular    Anthropometric Measures:  · Height: 5' 10\" (177.8 cm)  · Current Body Weight: 178 lb 8 oz (81 kg)   · Admission Body Weight: 175 lb (79.4 kg)    · Usual Body Weight: 174 lb 6.4 oz (79.1 kg) (2 weeks ago)     · Ideal Body Weight: 166 lbs; % Ideal Body Weight 107.5 %   · BMI: 25.6  · Adjusted Body Weight:  ; No Adjustment   · BMI Categories: Overweight (BMI 25.0-29. 9)       Nutrition Diagnosis:   · Altered nutrition-related lab values related to endocrine dysfuntion as evidenced by lab values    Lab Results   Component Value Date     01/03/2022    K 4.4 01/03/2022    CL 99 01/03/2022    CO2 36 (H) 01/03/2022    BUN 30 (H) 01/03/2022    CREATININE 0.85 01/03/2022    GLUCOSE 142 (H) 01/03/2022    CALCIUM 9.4 01/03/2022    PROT 6.8 07/11/2021    LABALBU 3.7 07/11/2021    BILITOT 0.30 07/11/2021    ALKPHOS 69 07/11/2021    AST 16 07/11/2021    ALT 19 07/11/2021    LABGLOM >60 01/03/2022    GFRAA >60 01/03/2022     No results found for: LABA1C  No results found for: EAG  Lab Results   Component Value Date    VITD25 53.0 03/12/2021     Nutrition Interventions:   Food and/or Nutrient Delivery:  Continue Current Diet  Nutrition Education/Counseling:  Education initiated   Coordination of Nutrition Care:  Continue to monitor while inpatient    Goals:  PO >75% meals with slow/methodical PO intakes.        Nutrition Monitoring and Evaluation:   Behavioral-Environmental Outcomes:  Beliefs and Attitutes   Food/Nutrient Intake Outcomes:  Food and Nutrient Intake  Physical Signs/Symptoms Outcomes:  Biochemical Data,Weight     Discharge Planning:    No discharge needs at this time     Electronically signed by Gauri Motta RD, LD on 1/3/22 at 8:26 AM EST    Contact: [phone]

## 2022-01-04 LAB
EKG ATRIAL RATE: 70 BPM
EKG Q-T INTERVAL: 402 MS
EKG QRS DURATION: 104 MS
EKG QTC CALCULATION (BAZETT): 408 MS
EKG R AXIS: 92 DEGREES
EKG T AXIS: 89 DEGREES
EKG VENTRICULAR RATE: 62 BPM

## 2022-01-04 PROCEDURE — 2580000003 HC RX 258: Performed by: INTERNAL MEDICINE

## 2022-01-04 PROCEDURE — 94640 AIRWAY INHALATION TREATMENT: CPT

## 2022-01-04 PROCEDURE — 6360000002 HC RX W HCPCS: Performed by: INTERNAL MEDICINE

## 2022-01-04 PROCEDURE — 94669 MECHANICAL CHEST WALL OSCILL: CPT

## 2022-01-04 PROCEDURE — 2700000000 HC OXYGEN THERAPY PER DAY

## 2022-01-04 PROCEDURE — 94761 N-INVAS EAR/PLS OXIMETRY MLT: CPT

## 2022-01-04 PROCEDURE — 93005 ELECTROCARDIOGRAM TRACING: CPT | Performed by: INTERNAL MEDICINE

## 2022-01-04 PROCEDURE — 6370000000 HC RX 637 (ALT 250 FOR IP): Performed by: INTERNAL MEDICINE

## 2022-01-04 PROCEDURE — 97161 PT EVAL LOW COMPLEX 20 MIN: CPT

## 2022-01-04 PROCEDURE — 1200000000 HC SEMI PRIVATE

## 2022-01-04 PROCEDURE — 93010 ELECTROCARDIOGRAM REPORT: CPT | Performed by: INTERNAL MEDICINE

## 2022-01-04 RX ADMIN — SODIUM CHLORIDE, PRESERVATIVE FREE 10 ML: 5 INJECTION INTRAVENOUS at 22:52

## 2022-01-04 RX ADMIN — SODIUM CHLORIDE, PRESERVATIVE FREE 10 ML: 5 INJECTION INTRAVENOUS at 08:44

## 2022-01-04 RX ADMIN — IPRATROPIUM BROMIDE 0.5 MG: 0.5 SOLUTION RESPIRATORY (INHALATION) at 17:13

## 2022-01-04 RX ADMIN — METHYLPREDNISOLONE SODIUM SUCCINATE 40 MG: 40 INJECTION, POWDER, FOR SOLUTION INTRAMUSCULAR; INTRAVENOUS at 06:38

## 2022-01-04 RX ADMIN — ROSUVASTATIN CALCIUM 20 MG: 20 TABLET, COATED ORAL at 20:47

## 2022-01-04 RX ADMIN — AMIODARONE HYDROCHLORIDE 200 MG: 200 TABLET ORAL at 08:43

## 2022-01-04 RX ADMIN — LEVALBUTEROL HYDROCHLORIDE 1.25 MG: 1.25 SOLUTION RESPIRATORY (INHALATION) at 13:44

## 2022-01-04 RX ADMIN — LEVALBUTEROL HYDROCHLORIDE 1.25 MG: 1.25 SOLUTION RESPIRATORY (INHALATION) at 04:58

## 2022-01-04 RX ADMIN — NYSTATIN 500000 UNITS: 100000 SUSPENSION ORAL at 17:51

## 2022-01-04 RX ADMIN — BUDESONIDE AND FORMOTEROL FUMARATE DIHYDRATE 2 PUFF: 160; 4.5 AEROSOL RESPIRATORY (INHALATION) at 08:53

## 2022-01-04 RX ADMIN — METHYLPREDNISOLONE SODIUM SUCCINATE 40 MG: 40 INJECTION, POWDER, FOR SOLUTION INTRAMUSCULAR; INTRAVENOUS at 16:04

## 2022-01-04 RX ADMIN — LEVALBUTEROL HYDROCHLORIDE 1.25 MG: 1.25 SOLUTION RESPIRATORY (INHALATION) at 08:53

## 2022-01-04 RX ADMIN — GUAIFENESIN 600 MG: 600 TABLET ORAL at 20:47

## 2022-01-04 RX ADMIN — ASPIRIN 81 MG: 81 TABLET, COATED ORAL at 08:43

## 2022-01-04 RX ADMIN — PANTOPRAZOLE SODIUM 40 MG: 40 TABLET, DELAYED RELEASE ORAL at 05:16

## 2022-01-04 RX ADMIN — AMIODARONE HYDROCHLORIDE 200 MG: 200 TABLET ORAL at 20:47

## 2022-01-04 RX ADMIN — IPRATROPIUM BROMIDE 0.5 MG: 0.5 SOLUTION RESPIRATORY (INHALATION) at 13:43

## 2022-01-04 RX ADMIN — IPRATROPIUM BROMIDE 0.5 MG: 0.5 SOLUTION RESPIRATORY (INHALATION) at 08:53

## 2022-01-04 RX ADMIN — METHYLPREDNISOLONE SODIUM SUCCINATE 40 MG: 40 INJECTION, POWDER, FOR SOLUTION INTRAMUSCULAR; INTRAVENOUS at 22:52

## 2022-01-04 RX ADMIN — SODIUM CHLORIDE, PRESERVATIVE FREE 10 ML: 5 INJECTION INTRAVENOUS at 16:04

## 2022-01-04 RX ADMIN — IPRATROPIUM BROMIDE 0.5 MG: 0.5 SOLUTION RESPIRATORY (INHALATION) at 21:20

## 2022-01-04 RX ADMIN — NYSTATIN 500000 UNITS: 100000 SUSPENSION ORAL at 13:18

## 2022-01-04 RX ADMIN — SPIRONOLACTONE 25 MG: 25 TABLET, FILM COATED ORAL at 08:43

## 2022-01-04 RX ADMIN — NYSTATIN 500000 UNITS: 100000 SUSPENSION ORAL at 08:43

## 2022-01-04 RX ADMIN — IPRATROPIUM BROMIDE 0.5 MG: 0.5 SOLUTION RESPIRATORY (INHALATION) at 04:58

## 2022-01-04 RX ADMIN — LEVALBUTEROL HYDROCHLORIDE 1.25 MG: 1.25 SOLUTION RESPIRATORY (INHALATION) at 21:20

## 2022-01-04 RX ADMIN — FUROSEMIDE 20 MG: 20 TABLET ORAL at 08:43

## 2022-01-04 RX ADMIN — BUDESONIDE AND FORMOTEROL FUMARATE DIHYDRATE 2 PUFF: 160; 4.5 AEROSOL RESPIRATORY (INHALATION) at 21:21

## 2022-01-04 RX ADMIN — CEFTRIAXONE SODIUM 1000 MG: 1 INJECTION, POWDER, FOR SOLUTION INTRAMUSCULAR; INTRAVENOUS at 22:55

## 2022-01-04 RX ADMIN — LEVALBUTEROL HYDROCHLORIDE 1.25 MG: 1.25 SOLUTION RESPIRATORY (INHALATION) at 17:13

## 2022-01-04 RX ADMIN — AZITHROMYCIN MONOHYDRATE 500 MG: 500 INJECTION, POWDER, LYOPHILIZED, FOR SOLUTION INTRAVENOUS at 05:12

## 2022-01-04 RX ADMIN — SODIUM CHLORIDE, PRESERVATIVE FREE 10 ML: 5 INJECTION INTRAVENOUS at 20:46

## 2022-01-04 RX ADMIN — FLUTICASONE PROPIONATE 1 SPRAY: 50 SPRAY, METERED NASAL at 08:46

## 2022-01-04 RX ADMIN — GUAIFENESIN 600 MG: 600 TABLET ORAL at 08:43

## 2022-01-04 RX ADMIN — ENOXAPARIN SODIUM 40 MG: 100 INJECTION SUBCUTANEOUS at 08:43

## 2022-01-04 RX ADMIN — NYSTATIN 500000 UNITS: 100000 SUSPENSION ORAL at 20:47

## 2022-01-04 NOTE — PROGRESS NOTES
[facility]  Occupational Therapy  Evaluation  Date: 2022  Patient Name: Danish De Oliveirar        MRN: [id number]    : 1950  (66 y.o.)  Gender: male   Referring Practitioner: Dr. Roxane Live  Diagnosis: weakness  Additional Pertinent Hx: 66 y.o. male presented to the emergency room complaining of worsening shortness of breath, generalized weakness, hypoxia. The patient has a history of severe COPD with chronic respiratory failure and usually using around 3.5 L of oxygen continuously. Past Medical History:   Diagnosis Date    Abdominal aneurysm ([address]     x2    Aortic aneurysm (HCC)     Atrial fibrillation (HCC)     Chicken pox     Chronic back pain     COPD (chronic obstructive pulmonary disease) (HCC)     Emphysema of lung (HCC)     Emphysema of lung (HCC)     Hyperlipidemia     Hypertension     Osteoarthritis      Past Surgical History:   Procedure Laterality Date    CARDIAC CATHETERIZATION Left 08/05/15    Dr. Jamee Barajas 30% disease of the left main trunck, Mild plaque disease in the coronary arteries.   Normal LV function, ejection fraction of 60%    EYE SURGERY                  Subjective  Subjective: Pt in bed upon arrival & agreeable to evaluation  Pain Level: 0     Vision  Vision: Within Functional Limits  Hearing  Hearing: Within functional limits  Social/Functional History  Lives With: Spouse  Type of Home: Mobile home  Home Layout: Able to Live on Main level with bedroom/bathroom  Home Access: Ramped entrance (deck on back w/ 4 steps)  Bathroom Shower/Tub: Walk-in shower  Bathroom Toilet: Standard  Bathroom Equipment: Grab bars in shower,Shower chair  Bathroom Accessibility: Accessible  Home Equipment: Cane,Rolling walker,Oxygen  ADL Assistance: Independent (past couple weeks pt needed assist d/t breathing difficulties)  Homemaking Assistance: Independent  Homemaking Responsibilities: No (helps wife clean if feeling well, otherwise wife does majority)  Ambulation Assistance: Independent  Transfer Assistance: Independent  Active : Yes  Patient's  Info: Pt states he drives to local appointments and pt uses [address] to [employer]. Additional Comments: Pt has home oxygen, ramp, walker, shower chair, wheelchair, grab bars at home to use. Pt is receiving a home care team RN and PT that visit infrequently through the [place] per spouse. Pt has 3L O2 at baseline. Prior Function  ADL Assistance: Independent (past couple weeks pt needed assist d/t breathing difficulties)  Homemaking Assistance: Independent  Ambulation Assistance: Independent  Transfer Assistance: Independent  Additional Comments: Pt has home oxygen, ramp, walker, shower chair, wheelchair, grab bars at home to use. Pt is receiving a home care team RN and PT that visit infrequently through the [place] per spouse. Pt has 3L O2 at baseline. Objective      ADL  Feeding: Independent  Grooming: Independent  UE Bathing: Independent  LE Bathing: Independent  UE Dressing: Independent  LE Dressing: Independent  Toileting: Independent   Supine to Sit: Independent       Balance  Sitting Balance: Independent  Standing Balance: Supervision       Functional Mobility  Functional - Mobility Device: No device  Activity: Other  Assist Level: Supervision         Assessment: OT evaluation completed. Pt in bed upon arrival & agreeable to OOB activity. Pt completes bed mobility independently w/o use of any bed rails or trapeze. Once EOB pt demonstrates ability to don/doff KRISTA socks independenlty using figure 4 pattern w/ good sitting balance. Pt demo's KRISTA UB ROM & MMT WFL. Pt completes all STS transfers & ambulation w/ independence & no AD. No LOB noted. Pt does demo some SOB w/ ambulation but pt is typically on 3.5 L of O2 at baseline. Discussed breathing techniques & EC/WS strategies w/ pt, pt states he is already familiar with these and utilizes them often.  Pt declines use of BR during evaluation but states that he has been able to complete all toileting tasks w/o difficulty. Pt left in chair w/ call light in reach and all needs met. Pt states he does not have any additional concerns regarding his ability to care for himself upon d/c home. Pt appears to be at baseline for all I/ADLs at this time. No further OT needs identified.      Goals  Short term goals  Time Frame for Short term goals: N/A  Long term goals  Time Frame for Long term goals : N/A    Plan  No Skilled OT: At baseline function,No OT goals identified  REQUIRES OT FOLLOW UP: No    Times per week: n/a    Timed Code Treatment Minutes: 0 Minutes       Time In: 3248  Time Out: 4242  Timed Coded Minutes: 0  Total Treatment Time: 22    SUHAIL Perea, OTR/L  1/4/2022

## 2022-01-04 NOTE — PROGRESS NOTES
SW spoke with pt this morning and he reports that spouse is out paying bills and he is uncertain if she heard from [address] [facility]. SW called and spoke with pt's spouse regarding if [facility]. Spouse states that she plans to call them back today. SW will follow and arrange as needed.  Marce Ibarra MSW LSW 1/4/2022

## 2022-01-04 NOTE — PROGRESS NOTES
Hospitalist Progress Note  1/4/2022 4:24 AM  Subjective:   Admit Date: 12/31/2021  PCP: Shanon Fowler, DO    Interval History: Neto Newberry feels he is doing a little better. Continues to have a productive cough. No chest pain. His appetite is okay with no nausea. No trouble  Urinating. Diet: ADULT DIET; Regular  Medications:   Scheduled Meds:   methylPREDNISolone  40 mg IntraVENous Q8H    azithromycin  500 mg IntraVENous Q24H    spironolactone  25 mg Oral Daily    dilTIAZem  180 mg Oral BID    amiodarone  200 mg Oral BID    guaiFENesin  600 mg Oral BID    nystatin  5 mL Oral 4x Daily    amiodarone bolus  150 mg IntraVENous Once    aspirin  81 mg Oral Daily    budesonide-formoterol  2 puff Inhalation BID    carvedilol  25 mg Oral BID WC    fluticasone  1 spray Nasal Daily    furosemide  20 mg Oral Daily    pantoprazole  40 mg Oral QAM AC    rosuvastatin  20 mg Oral Daily    levalbuterol  1.25 mg Nebulization Q4H WA    sodium chloride flush  5-40 mL IntraVENous 2 times per day    enoxaparin  40 mg SubCUTAneous Daily    ipratropium  0.5 mg Nebulization Q4H WA    cefTRIAXone (ROCEPHIN) IV  1,000 mg IntraVENous Q24H     Continuous Infusions:   sodium chloride         Patient's current medications documented, reviewed, and updated. CBC:   Recent Labs     01/01/22 0519 01/02/22  0512 01/03/22  0400   WBC 7.7 15.1* 13.5*   HGB 10.6* 10.0* 10.3*    204 206     BMP:    Recent Labs     01/01/22 0519 01/02/22  0512 01/03/22  0400    144 143   K 4.4 4.6 4.4   CL 97* 99 99   CO2 35* 36* 36*   BUN 12 26* 30*   CREATININE 0.74 0.82 0.85   GLUCOSE 159* 143* 142*     Hepatic: No results for input(s): AST, ALT, ALB, BILITOT, ALKPHOS in the last 72 hours. Troponin: No results for input(s): TROPONINI in the last 72 hours. BNP: No results for input(s): BNP in the last 72 hours. Lipids: No results for input(s): CHOL, HDL in the last 72 hours.     Invalid input(s): LDLCALCU  INR: No results for input(s): INR in the last 72 hours. Objective:   Vitals: BP (!) 125/52   Pulse 72   Temp 97.5 °F (36.4 °C) (Oral)   Resp 18   Ht 5' 10\" (1.778 m)   Wt 178 lb 8 oz (81 kg)   SpO2 93%   BMI 25.61 kg/m²   General appearance: alert and cooperative with exam  HEENT: Head: Normocephalic, no lesions, without obvious abnormality. Eye: Normal external eye, conjunctiva, lids cornea, GENET. Nose: Normal external nose, mucus membranes and septum. Nose: Nasal oxygen on. Neck: no adenopathy, no carotid bruit and supple, symmetrical, trachea midline  Lungs: Bilateral expiratory wheezing. Heart: irregularly irregular rhythm, S1, S2 normal and difficult to appreciate secondary to wheezing. Abdomen: soft, non-tender; bowel sounds normal; no masses,  no organomegaly  Extremities: extremities normal, atraumatic, no cyanosis or edema and ACE wraps on. Neurologic: Mental status: Alert, oriented, thought content appropriate    Assessment and Plan:   1. Atrial fib with RVR - cardioverted with Amiodarone, Cardizem, and Digoxin but went back into a fib on 1/3/22 (rate controlled). Refuses NOAC secondary to worries of AAA rupture. Continues on Cardizem and Coreg. ECHO results pending.     2. COPD exacerbation - on IV Solumedrol and Xopenex treatments. On Mucinex, Acapella, and EZPAP. CT chest revealed no PE, RLL consolidation nonspecific may represent atelectasis, pneumonia or aspiration. Started on IV Rocephin upon admission with Zithromax added on 1/3/21. Solumedrol increased to 40 mg every 8 hours on 1/3/21.       3. Acute on chronic hypoxic respiratory failure - back to baseline nasal oxygen at 3.5 l/m which he uses at home.     4.  H/O AAA - CT abdomen/pelvis showed fusiform infrarenal abdominal aortic aneurysm 3.9 x 3.5 cm, not much changed from 4/15/2021.  Another aneurysm superiorly measuring 3.7 x 2.8 cm also containing moderate amount of thrombus not changed from 4/15/2021.     5. Chronic anemia - stable.    6.  H/O neck mass - no abnormality seen on CT scan of the neck.       7.  Weakness - PT / OT ordered. Plan:  1. Continue the current treatment. 2.  Dr. Carmen Lala to perform stress test when stable. Patient continues to require in patient admission secondary to the need for IV antibiotics, IV steroids, and frequent aerosols.      DVT prophylaxis:   [x] Lovenox   [] SCDs   [] SQ Heparin   [] Encourage ambulation, low risk for DVT, no chemical or mechanical    prophylaxis necessary      [] Already on Anticoagulation    Patient Active Problem List:     COPD     Pure hypercholesterolemia     Osteoarthritis     Chest pain     Essential hypertension     Positive FIT (fecal immunochemical test)     Aneurysm of suprarenal aorta (HCC)     Paroxysmal atrial fibrillation (HCC)     Atrial fibrillation with RVR ([address]      Naa Hernandez MD, MD  [facility] Hospitalist

## 2022-01-04 NOTE — PROGRESS NOTES
[facility]  Physical Therapy  Evaluation  Date: 2022  Patient Name: Pretty Campos        MRN: [id number]    : 1950  (66 y.o.)  Gender: male   Referring Practitioner: Dr. Mary Goins  Diagnosis: COPD  Additional Pertinent Hx: Patient admitted with one month onset of progressive SOB associated with exacerbation of COPD. Patient utilizes 3.5 L 02 at home normally/prior to admission. Referred to PT to assess mobility and strength   Past Medical History:   Diagnosis Date    Abdominal aneurysm ([address]     x2    Aortic aneurysm (HCC)     Atrial fibrillation (HCC)     Chicken pox     Chronic back pain     COPD (chronic obstructive pulmonary disease) (HCC)     Emphysema of lung (HCC)     Emphysema of lung (HCC)     Hyperlipidemia     Hypertension     Osteoarthritis      Past Surgical History:   Procedure Laterality Date    CARDIAC CATHETERIZATION Left 08/05/15    Dr. Judah Burk 30% disease of the left main trunck, Mild plaque disease in the coronary arteries. Normal LV function, ejection fraction of 60%    EYE SURGERY         Restrictions         Subjective         Pain Level: 0    Orientation  Overall Orientation Status: Within Normal Limits    Home Living  Type of Home: Mobile home  Home Layout: Able to Live on Main level with bedroom/bathroom  Home Access: Ramped entrance (deck on back w/ 4 steps)  Home Equipment: Cane,Rolling walker,Oxygen    Prior Level of Function  Additional Comments: Pt has home oxygen, ramp, walker, shower chair, wheelchair, grab bars at home to use. Pt is receiving a home care team RN and PT that visit infrequently through the [place] per spouse. Pt has 3L O2 at baseline.   Prior Function  ADL Assistance: Independent (past couple weeks pt needed assist d/t breathing difficulties)  Homemaking Assistance: Independent  Ambulation Assistance: Independent  Transfer Assistance: Independent  Additional Comments: Pt has home oxygen, ramp, walker, shower chair, wheelchair, grab bars at home to use. Pt is receiving a home care team RN and PT that visit infrequently through the [place] per spouse. Pt has 3L O2 at baseline. Objective                       Strength RLE  Strength RLE: WFL  Comment: Generally 4+-5/5 hip and knee  Strength LLE  Strength LLE: WFL  Comment: Generally 4+-5/5 hip and knee  Strength RUE  Strength RUE: WFL  Strength LUE  Strength LUE: WFL             Supine to Sit: Independent  Sit to Stand: Independent  Stand to sit: Independent           Ambulation 1  Surface: level tile  Device: No Device  Assistance: Supervision  Quality of Gait: steady; no LOB noted. Mild SOB with activity and fatigue noted subjectively  Distance: 20-25 ft in room with 02 @ 3.5L NC  Balance  Sitting - Static: Good  Sitting - Dynamic: Good  Standing - Static: Good  Standing - Dynamic: Good    Assessment  Activity Tolerance: Patient Tolerated treatment well   Chart Reviewed: Yes  Assessment: Patient admitted with progressive SOB and weakness associated with COPD/ heart issues. Referred to PT to assess mobility and strength. Presently, patient transfers independently and ambulates in room without device using 02@ 3.5 L . He exhibits no LOB but mild SOB and subjective fatigue with ambulation. Strength and ROM WFL x 4 extremties and he is able to don/doff sock independently. Patient lives at home with spouse and has previously been utilizing 02@ 3.5L. Based on safety and independence with mobility, no acute skilled PT need identified. Disscussed increased mobility and ambulation with patient along with nursing supervision. Prognosis: Good     Type of devices: Nurse notified,Left in chair,Call light within reach     Plan  Times per week: No acute skilled PT needs identified.   Patient educated on energy conservation and increased mobility prior to discharge    Goals  Short term goals  Time Frame for Short term goals: 1 visit  Short term goal 1: Evaluation to assess

## 2022-01-04 NOTE — PROGRESS NOTES
Patient converts to NSR, verified with EKG. Neil Win RT completes at this time, EKG reads Sinus bradycardia.

## 2022-01-04 NOTE — PROGRESS NOTES
Pt noted to have HR dropping to 30's however increases to 50's and low 60's. Dr. Zack Elliott is notified of such. Order to hold Coreg and Cardizem taken. Will relay this to oncoming nurse.

## 2022-01-05 LAB
ACQUISITION DURATION: NORMAL S
AVERAGE HEART RATE: 75 BPM
EKG ATRIAL RATE: 57 BPM
EKG DIAGNOSIS: NORMAL
EKG P AXIS: 56 DEGREES
EKG P-R INTERVAL: 132 MS
EKG Q-T INTERVAL: 360 MS
EKG QRS DURATION: 86 MS
EKG QTC CALCULATION (BAZETT): 350 MS
EKG R AXIS: 83 DEGREES
EKG T AXIS: 86 DEGREES
EKG VENTRICULAR RATE: 57 BPM
HOLTER MAX HEART RATE: 137 BPM
HOOKUP DATE: NORMAL
HOOKUP TIME: NORMAL
MAX HEART RATE TIME/DATE: NORMAL
MIN HEART RATE TIME/DATE: NORMAL
MIN HEART RATE: 51 BPM
NUMBER OF QRS COMPLEXES: NORMAL
NUMBER OF SUPRAVENTRICULAR COUPLETS: 4
NUMBER OF SUPRAVENTRICULAR ECTOPICS: 2715
NUMBER OF SUPRAVENTRICULAR ISOLATED BEATS: 1999
NUMBER OF VENTRICULAR BIGEMINAL CYCLES: 0
NUMBER OF VENTRICULAR COUPLETS: 21
NUMBER OF VENTRICULAR ECTOPICS: 865

## 2022-01-05 PROCEDURE — 2580000003 HC RX 258: Performed by: INTERNAL MEDICINE

## 2022-01-05 PROCEDURE — 6370000000 HC RX 637 (ALT 250 FOR IP): Performed by: INTERNAL MEDICINE

## 2022-01-05 PROCEDURE — 6360000002 HC RX W HCPCS: Performed by: INTERNAL MEDICINE

## 2022-01-05 PROCEDURE — 1200000000 HC SEMI PRIVATE

## 2022-01-05 PROCEDURE — 94640 AIRWAY INHALATION TREATMENT: CPT

## 2022-01-05 PROCEDURE — 94669 MECHANICAL CHEST WALL OSCILL: CPT

## 2022-01-05 PROCEDURE — 93010 ELECTROCARDIOGRAM REPORT: CPT | Performed by: INTERNAL MEDICINE

## 2022-01-05 PROCEDURE — 2700000000 HC OXYGEN THERAPY PER DAY

## 2022-01-05 PROCEDURE — 94761 N-INVAS EAR/PLS OXIMETRY MLT: CPT

## 2022-01-05 RX ORDER — FUROSEMIDE 20 MG/1
20 TABLET ORAL DAILY
Status: DISCONTINUED | OUTPATIENT
Start: 2022-01-06 | End: 2022-01-07 | Stop reason: HOSPADM

## 2022-01-05 RX ORDER — FUROSEMIDE 10 MG/ML
20 INJECTION INTRAMUSCULAR; INTRAVENOUS ONCE
Status: COMPLETED | OUTPATIENT
Start: 2022-01-05 | End: 2022-01-05

## 2022-01-05 RX ADMIN — ENOXAPARIN SODIUM 40 MG: 100 INJECTION SUBCUTANEOUS at 08:05

## 2022-01-05 RX ADMIN — GUAIFENESIN 600 MG: 600 TABLET ORAL at 20:39

## 2022-01-05 RX ADMIN — NYSTATIN 500000 UNITS: 100000 SUSPENSION ORAL at 12:16

## 2022-01-05 RX ADMIN — NYSTATIN 500000 UNITS: 100000 SUSPENSION ORAL at 08:07

## 2022-01-05 RX ADMIN — LEVALBUTEROL HYDROCHLORIDE 1.25 MG: 1.25 SOLUTION RESPIRATORY (INHALATION) at 13:32

## 2022-01-05 RX ADMIN — IPRATROPIUM BROMIDE 0.5 MG: 0.5 SOLUTION RESPIRATORY (INHALATION) at 16:52

## 2022-01-05 RX ADMIN — METHYLPREDNISOLONE SODIUM SUCCINATE 40 MG: 40 INJECTION, POWDER, FOR SOLUTION INTRAMUSCULAR; INTRAVENOUS at 06:38

## 2022-01-05 RX ADMIN — BUDESONIDE AND FORMOTEROL FUMARATE DIHYDRATE 2 PUFF: 160; 4.5 AEROSOL RESPIRATORY (INHALATION) at 09:11

## 2022-01-05 RX ADMIN — SPIRONOLACTONE 25 MG: 25 TABLET, FILM COATED ORAL at 08:06

## 2022-01-05 RX ADMIN — PANTOPRAZOLE SODIUM 40 MG: 40 TABLET, DELAYED RELEASE ORAL at 06:30

## 2022-01-05 RX ADMIN — GUAIFENESIN 600 MG: 600 TABLET ORAL at 08:07

## 2022-01-05 RX ADMIN — LEVALBUTEROL HYDROCHLORIDE 1.25 MG: 1.25 SOLUTION RESPIRATORY (INHALATION) at 16:52

## 2022-01-05 RX ADMIN — METHYLPREDNISOLONE SODIUM SUCCINATE 40 MG: 40 INJECTION, POWDER, FOR SOLUTION INTRAMUSCULAR; INTRAVENOUS at 14:17

## 2022-01-05 RX ADMIN — FUROSEMIDE 20 MG: 10 INJECTION, SOLUTION INTRAMUSCULAR; INTRAVENOUS at 06:25

## 2022-01-05 RX ADMIN — NYSTATIN 500000 UNITS: 100000 SUSPENSION ORAL at 20:38

## 2022-01-05 RX ADMIN — LEVALBUTEROL HYDROCHLORIDE 1.25 MG: 1.25 SOLUTION RESPIRATORY (INHALATION) at 04:48

## 2022-01-05 RX ADMIN — IPRATROPIUM BROMIDE 0.5 MG: 0.5 SOLUTION RESPIRATORY (INHALATION) at 09:07

## 2022-01-05 RX ADMIN — IPRATROPIUM BROMIDE 0.5 MG: 0.5 SOLUTION RESPIRATORY (INHALATION) at 21:17

## 2022-01-05 RX ADMIN — METOPROLOL TARTRATE 12.5 MG: 25 TABLET, FILM COATED ORAL at 11:09

## 2022-01-05 RX ADMIN — AMIODARONE HYDROCHLORIDE 200 MG: 200 TABLET ORAL at 20:39

## 2022-01-05 RX ADMIN — LEVALBUTEROL HYDROCHLORIDE 1.25 MG: 1.25 SOLUTION RESPIRATORY (INHALATION) at 21:17

## 2022-01-05 RX ADMIN — FLUTICASONE PROPIONATE 1 SPRAY: 50 SPRAY, METERED NASAL at 08:07

## 2022-01-05 RX ADMIN — AMIODARONE HYDROCHLORIDE 200 MG: 200 TABLET ORAL at 08:00

## 2022-01-05 RX ADMIN — PIPERACILLIN SODIUM AND TAZOBACTAM SODIUM 3375 MG: 3; .375 INJECTION, POWDER, LYOPHILIZED, FOR SOLUTION INTRAVENOUS at 06:31

## 2022-01-05 RX ADMIN — METOPROLOL TARTRATE 12.5 MG: 25 TABLET, FILM COATED ORAL at 20:39

## 2022-01-05 RX ADMIN — ROSUVASTATIN CALCIUM 20 MG: 20 TABLET, COATED ORAL at 20:39

## 2022-01-05 RX ADMIN — IPRATROPIUM BROMIDE 0.5 MG: 0.5 SOLUTION RESPIRATORY (INHALATION) at 04:47

## 2022-01-05 RX ADMIN — BUDESONIDE AND FORMOTEROL FUMARATE DIHYDRATE 2 PUFF: 160; 4.5 AEROSOL RESPIRATORY (INHALATION) at 21:17

## 2022-01-05 RX ADMIN — PIPERACILLIN SODIUM AND TAZOBACTAM SODIUM 3375 MG: 3; .375 INJECTION, POWDER, LYOPHILIZED, FOR SOLUTION INTRAVENOUS at 14:16

## 2022-01-05 RX ADMIN — ASPIRIN 81 MG: 81 TABLET, COATED ORAL at 08:01

## 2022-01-05 RX ADMIN — PIPERACILLIN SODIUM AND TAZOBACTAM SODIUM 3375 MG: 3; .375 INJECTION, POWDER, LYOPHILIZED, FOR SOLUTION INTRAVENOUS at 22:07

## 2022-01-05 RX ADMIN — AZITHROMYCIN MONOHYDRATE 500 MG: 500 INJECTION, POWDER, LYOPHILIZED, FOR SOLUTION INTRAVENOUS at 04:57

## 2022-01-05 RX ADMIN — LEVALBUTEROL HYDROCHLORIDE 1.25 MG: 1.25 SOLUTION RESPIRATORY (INHALATION) at 09:07

## 2022-01-05 RX ADMIN — SODIUM CHLORIDE, PRESERVATIVE FREE 10 ML: 5 INJECTION INTRAVENOUS at 21:00

## 2022-01-05 RX ADMIN — IPRATROPIUM BROMIDE 0.5 MG: 0.5 SOLUTION RESPIRATORY (INHALATION) at 13:32

## 2022-01-05 ASSESSMENT — PAIN SCALES - GENERAL
PAINLEVEL_OUTOF10: 0
PAINLEVEL_OUTOF10: 0

## 2022-01-05 NOTE — PROGRESS NOTES
Hospitalist Progress Note  1/5/2022 5:04 AM  Subjective:   Admit Date: 12/31/2021  PCP: Shyanne Cordova, DO    Interval History: Rajendra De Guzman has no complaints this morning. He states his breathing, \"comes and goes\". Cough seems to be doing a little better with less production. He has noticed some increased swelling in his arms which I told him was expected with steroids. No chest pain. Stayed in NSR over night. Bowels moving and no trouble urinating. Diet: ADULT DIET; Regular  Medications:   Scheduled Meds:   methylPREDNISolone  40 mg IntraVENous Q8H    azithromycin  500 mg IntraVENous Q24H    spironolactone  25 mg Oral Daily    [Held by provider] dilTIAZem  180 mg Oral BID    amiodarone  200 mg Oral BID    guaiFENesin  600 mg Oral BID    nystatin  5 mL Oral 4x Daily    amiodarone bolus  150 mg IntraVENous Once    aspirin  81 mg Oral Daily    budesonide-formoterol  2 puff Inhalation BID    [Held by provider] carvedilol  25 mg Oral BID WC    fluticasone  1 spray Nasal Daily    furosemide  20 mg Oral Daily    pantoprazole  40 mg Oral QAM AC    rosuvastatin  20 mg Oral Daily    levalbuterol  1.25 mg Nebulization Q4H WA    sodium chloride flush  5-40 mL IntraVENous 2 times per day    enoxaparin  40 mg SubCUTAneous Daily    ipratropium  0.5 mg Nebulization Q4H WA     Continuous Infusions:   sodium chloride         Patient's current medications documented, reviewed, and updated. CBC:   Recent Labs     01/02/22  0512 01/03/22  0400   WBC 15.1* 13.5*   HGB 10.0* 10.3*    206     BMP:    Recent Labs     01/02/22  0512 01/03/22  0400    143   K 4.6 4.4   CL 99 99   CO2 36* 36*   BUN 26* 30*   CREATININE 0.82 0.85   GLUCOSE 143* 142*     Hepatic: No results for input(s): AST, ALT, ALB, BILITOT, ALKPHOS in the last 72 hours. Troponin: No results for input(s): TROPONINI in the last 72 hours. BNP: No results for input(s): BNP in the last 72 hours.   Lipids: No results for input(s): CHOL, HDL in the last 72 hours. Invalid input(s): LDLCALCU  INR: No results for input(s): INR in the last 72 hours. Objective:   Vitals: BP (!) 144/80   Pulse 73   Temp 98 °F (36.7 °C) (Oral)   Resp 22   Ht 5' 10\" (1.778 m)   Wt 178 lb 8 oz (81 kg)   SpO2 94%   BMI 25.61 kg/m²   General appearance: alert and cooperative with exam  HEENT: Head: Normocephalic, no lesions, without obvious abnormality. Eye: Normal external eye, conjunctiva, lids cornea, GENET. Nose: Normal external nose, mucus membranes and septum. Nose: Nasal oxygen on. Neck: no adenopathy and supple, symmetrical, trachea midline  Lungs: Bilateral expiratory wheezing. Heart: regular rate and rhythm and S1, S2 normal  Abdomen: soft, non-tender; bowel sounds normal; no masses,  no organomegaly  Extremities: ACE wraps on the lower legs. Swelling in the arms / hands. Neurologic: Mental status: Alert, oriented, thought content appropriate    Assessment and Plan:   1.  Atrial fib with RVR - cardioverted with Amiodarone, Cardizem, and Digoxin but went back into a fib on 1/3/22 (rate controlled) but converted back to sinus.  Refuses NOAC secondary to worries of AAA rupture.  Continues on Cardizem and Coreg.  ECHO showing normal LV function with an EF of 55%, moderate TR, mild MR, moderate severe pulmonary HTN, mildly enlarge RA / RV.     2.  COPD exacerbation - on IV Solumedrol and Xopenex / Atrovent treatments. On Mucinex, Acapella, and EZPAP.  On Symbicort. CT chest revealed no PE, RLL consolidation nonspecific may represent atelectasis, pneumonia or aspiration.  Started on IV Rocephin upon admission with Zithromax added on 1/3/21. Solumedrol increased to 40 mg every 8 hours on 1/3/21.       3.  Acute on chronic hypoxic respiratory failure - back to baseline nasal oxygen at 3.5 l/m which he uses at home.     4.  H/O AAA - CT abdomen/pelvis showed fusiform infrarenal abdominal aortic aneurysm 3.9 x 3.5 cm, not much changed from 4/15/2021.  Another aneurysm superiorly measuring 3.7 x 2.8 cm also containing moderate amount of thrombus not changed from 4/15/2021.     5.  Chronic anemia - stable.       6.  H/O neck mass - no abnormality seen on CT scan of the neck.       7. Aria Varghese - PT / OT ordered. 8.  Sinus bradycardia on 1/4/21 - Cardizem / Coreg held. Plan:  1. With the slow progression, I am going to change Rocephin to Zosyn with the concerns of possible aspiration pneumonia on CTA of chest.  2.  Dr. Itzel Cutler to evaluate cardiac medication with adjustment today. Patient continues to require in patient admission secondary to the need for IV antibiotics, IV steroids, and frequent respiratory treatments.       DVT prophylaxis:   [x] Lovenox   [] SCDs   [] SQ Heparin   [] Encourage ambulation, low risk for DVT, no chemical or mechanical    prophylaxis necessary      [] Already on Anticoagulation    Patient Active Problem List:     COPD     Pure hypercholesterolemia     Osteoarthritis     Chest pain     Essential hypertension     Positive FIT (fecal immunochemical test)     Aneurysm of suprarenal aorta (HCC)     Paroxysmal atrial fibrillation (HCC)     Atrial fibrillation with RVR ([address]        Jennifer Suggs MD, MD  [facility] Hospitalist

## 2022-01-05 NOTE — PROGRESS NOTES
Wife, Yudi, called back and given update on patient; Dr. Patricio Hodges called with request to call and talk with Yudi regarding plan of care and status.

## 2022-01-06 LAB
ANION GAP SERPL CALCULATED.3IONS-SCNC: 7 MMOL/L (ref 9–17)
BUN BLDV-MCNC: 24 MG/DL (ref 8–23)
BUN/CREAT BLD: 39 (ref 9–20)
CALCIUM SERPL-MCNC: 8.8 MG/DL (ref 8.6–10.4)
CHLORIDE BLD-SCNC: 99 MMOL/L (ref 98–107)
CO2: 36 MMOL/L (ref 20–31)
CREAT SERPL-MCNC: 0.62 MG/DL (ref 0.7–1.2)
GFR AFRICAN AMERICAN: >60 ML/MIN
GFR NON-AFRICAN AMERICAN: >60 ML/MIN
GFR SERPL CREATININE-BSD FRML MDRD: ABNORMAL ML/MIN/{1.73_M2}
GFR SERPL CREATININE-BSD FRML MDRD: ABNORMAL ML/MIN/{1.73_M2}
GLUCOSE BLD-MCNC: 137 MG/DL (ref 70–99)
POTASSIUM SERPL-SCNC: 4.1 MMOL/L (ref 3.7–5.3)
SODIUM BLD-SCNC: 142 MMOL/L (ref 135–144)

## 2022-01-06 PROCEDURE — 2580000003 HC RX 258: Performed by: INTERNAL MEDICINE

## 2022-01-06 PROCEDURE — 6370000000 HC RX 637 (ALT 250 FOR IP): Performed by: INTERNAL MEDICINE

## 2022-01-06 PROCEDURE — 80048 BASIC METABOLIC PNL TOTAL CA: CPT

## 2022-01-06 PROCEDURE — 1200000000 HC SEMI PRIVATE

## 2022-01-06 PROCEDURE — 99231 SBSQ HOSP IP/OBS SF/LOW 25: CPT | Performed by: INTERNAL MEDICINE

## 2022-01-06 PROCEDURE — 94669 MECHANICAL CHEST WALL OSCILL: CPT

## 2022-01-06 PROCEDURE — 2700000000 HC OXYGEN THERAPY PER DAY

## 2022-01-06 PROCEDURE — 6360000002 HC RX W HCPCS: Performed by: INTERNAL MEDICINE

## 2022-01-06 PROCEDURE — 94640 AIRWAY INHALATION TREATMENT: CPT

## 2022-01-06 PROCEDURE — 36415 COLL VENOUS BLD VENIPUNCTURE: CPT

## 2022-01-06 PROCEDURE — 94761 N-INVAS EAR/PLS OXIMETRY MLT: CPT

## 2022-01-06 RX ORDER — LOSARTAN POTASSIUM 50 MG/1
50 TABLET ORAL DAILY
Status: DISCONTINUED | OUTPATIENT
Start: 2022-01-06 | End: 2022-01-07 | Stop reason: HOSPADM

## 2022-01-06 RX ORDER — AMIODARONE HYDROCHLORIDE 200 MG/1
200 TABLET ORAL 2 TIMES DAILY
Qty: 60 TABLET | Refills: 0 | Status: CANCELLED | OUTPATIENT
Start: 2022-01-06

## 2022-01-06 RX ORDER — AMIODARONE HYDROCHLORIDE 200 MG/1
200 TABLET ORAL DAILY
Status: DISCONTINUED | OUTPATIENT
Start: 2022-01-06 | End: 2022-01-07 | Stop reason: HOSPADM

## 2022-01-06 RX ORDER — HYDRALAZINE HYDROCHLORIDE 20 MG/ML
10 INJECTION INTRAMUSCULAR; INTRAVENOUS EVERY 6 HOURS PRN
Status: DISCONTINUED | OUTPATIENT
Start: 2022-01-06 | End: 2022-01-07 | Stop reason: HOSPADM

## 2022-01-06 RX ADMIN — METOPROLOL TARTRATE 25 MG: 25 TABLET, FILM COATED ORAL at 20:29

## 2022-01-06 RX ADMIN — PIPERACILLIN SODIUM AND TAZOBACTAM SODIUM 3375 MG: 3; .375 INJECTION, POWDER, LYOPHILIZED, FOR SOLUTION INTRAVENOUS at 13:50

## 2022-01-06 RX ADMIN — ROSUVASTATIN CALCIUM 20 MG: 20 TABLET, COATED ORAL at 20:29

## 2022-01-06 RX ADMIN — NYSTATIN 500000 UNITS: 100000 SUSPENSION ORAL at 08:03

## 2022-01-06 RX ADMIN — BUDESONIDE AND FORMOTEROL FUMARATE DIHYDRATE 2 PUFF: 160; 4.5 AEROSOL RESPIRATORY (INHALATION) at 21:06

## 2022-01-06 RX ADMIN — ASPIRIN 81 MG: 81 TABLET, COATED ORAL at 08:03

## 2022-01-06 RX ADMIN — IPRATROPIUM BROMIDE 0.5 MG: 0.5 SOLUTION RESPIRATORY (INHALATION) at 09:10

## 2022-01-06 RX ADMIN — LEVALBUTEROL HYDROCHLORIDE 1.25 MG: 1.25 SOLUTION RESPIRATORY (INHALATION) at 18:13

## 2022-01-06 RX ADMIN — SODIUM CHLORIDE, PRESERVATIVE FREE 10 ML: 5 INJECTION INTRAVENOUS at 20:29

## 2022-01-06 RX ADMIN — METHYLPREDNISOLONE SODIUM SUCCINATE 40 MG: 40 INJECTION, POWDER, FOR SOLUTION INTRAMUSCULAR; INTRAVENOUS at 00:05

## 2022-01-06 RX ADMIN — SPIRONOLACTONE 25 MG: 25 TABLET, FILM COATED ORAL at 08:03

## 2022-01-06 RX ADMIN — PANTOPRAZOLE SODIUM 40 MG: 40 TABLET, DELAYED RELEASE ORAL at 06:20

## 2022-01-06 RX ADMIN — PIPERACILLIN SODIUM AND TAZOBACTAM SODIUM 3375 MG: 3; .375 INJECTION, POWDER, LYOPHILIZED, FOR SOLUTION INTRAVENOUS at 05:59

## 2022-01-06 RX ADMIN — LEVALBUTEROL HYDROCHLORIDE 1.25 MG: 1.25 SOLUTION RESPIRATORY (INHALATION) at 09:10

## 2022-01-06 RX ADMIN — LOSARTAN POTASSIUM 50 MG: 50 TABLET, FILM COATED ORAL at 08:03

## 2022-01-06 RX ADMIN — IPRATROPIUM BROMIDE 0.5 MG: 0.5 SOLUTION RESPIRATORY (INHALATION) at 21:06

## 2022-01-06 RX ADMIN — METHYLPREDNISOLONE SODIUM SUCCINATE 40 MG: 40 INJECTION, POWDER, FOR SOLUTION INTRAMUSCULAR; INTRAVENOUS at 23:05

## 2022-01-06 RX ADMIN — GUAIFENESIN 600 MG: 600 TABLET ORAL at 20:29

## 2022-01-06 RX ADMIN — FLUTICASONE PROPIONATE 1 SPRAY: 50 SPRAY, METERED NASAL at 08:04

## 2022-01-06 RX ADMIN — NYSTATIN 500000 UNITS: 100000 SUSPENSION ORAL at 13:48

## 2022-01-06 RX ADMIN — ENOXAPARIN SODIUM 40 MG: 100 INJECTION SUBCUTANEOUS at 08:02

## 2022-01-06 RX ADMIN — BUDESONIDE AND FORMOTEROL FUMARATE DIHYDRATE 2 PUFF: 160; 4.5 AEROSOL RESPIRATORY (INHALATION) at 09:12

## 2022-01-06 RX ADMIN — METOPROLOL TARTRATE 25 MG: 25 TABLET, FILM COATED ORAL at 08:03

## 2022-01-06 RX ADMIN — FUROSEMIDE 20 MG: 20 TABLET ORAL at 08:03

## 2022-01-06 RX ADMIN — IPRATROPIUM BROMIDE 0.5 MG: 0.5 SOLUTION RESPIRATORY (INHALATION) at 18:13

## 2022-01-06 RX ADMIN — AZITHROMYCIN MONOHYDRATE 500 MG: 500 INJECTION, POWDER, LYOPHILIZED, FOR SOLUTION INTRAVENOUS at 04:28

## 2022-01-06 RX ADMIN — IPRATROPIUM BROMIDE 0.5 MG: 0.5 SOLUTION RESPIRATORY (INHALATION) at 05:31

## 2022-01-06 RX ADMIN — AMIODARONE HYDROCHLORIDE 200 MG: 200 TABLET ORAL at 08:03

## 2022-01-06 RX ADMIN — METHYLPREDNISOLONE SODIUM SUCCINATE 40 MG: 40 INJECTION, POWDER, FOR SOLUTION INTRAMUSCULAR; INTRAVENOUS at 06:38

## 2022-01-06 RX ADMIN — LEVALBUTEROL HYDROCHLORIDE 1.25 MG: 1.25 SOLUTION RESPIRATORY (INHALATION) at 21:06

## 2022-01-06 RX ADMIN — LEVALBUTEROL HYDROCHLORIDE 1.25 MG: 1.25 SOLUTION RESPIRATORY (INHALATION) at 05:31

## 2022-01-06 RX ADMIN — PIPERACILLIN SODIUM AND TAZOBACTAM SODIUM 3375 MG: 3; .375 INJECTION, POWDER, LYOPHILIZED, FOR SOLUTION INTRAVENOUS at 22:19

## 2022-01-06 RX ADMIN — NYSTATIN 500000 UNITS: 100000 SUSPENSION ORAL at 20:29

## 2022-01-06 RX ADMIN — IPRATROPIUM BROMIDE 0.5 MG: 0.5 SOLUTION RESPIRATORY (INHALATION) at 13:30

## 2022-01-06 RX ADMIN — METHYLPREDNISOLONE SODIUM SUCCINATE 40 MG: 40 INJECTION, POWDER, FOR SOLUTION INTRAMUSCULAR; INTRAVENOUS at 13:49

## 2022-01-06 RX ADMIN — GUAIFENESIN 600 MG: 600 TABLET ORAL at 08:03

## 2022-01-06 RX ADMIN — LEVALBUTEROL HYDROCHLORIDE 1.25 MG: 1.25 SOLUTION RESPIRATORY (INHALATION) at 13:30

## 2022-01-06 ASSESSMENT — PAIN SCALES - GENERAL
PAINLEVEL_OUTOF10: 0

## 2022-01-06 NOTE — PROGRESS NOTES
Hospitalist Progress Note  1/6/2022 5:22 AM  Subjective:   Admit Date: 12/31/2021  PCP: Georges Leyva, DO    Interval History: Carolyn Shah states he is feeling better and hopes to go home tomorrow. Cough seems better with the change in antibiotic yesterday. No chest pain. He states he did urinate a lot after the dose of IV Lasix yesterday. HR has been stable with the change in medication. Appetite is good, no nausea. Diet: ADULT DIET; Regular  Medications:   Scheduled Meds:   piperacillin-tazobactam  3,375 mg IntraVENous Q8H    furosemide  20 mg Oral Daily    metoprolol tartrate  12.5 mg Oral BID    methylPREDNISolone  40 mg IntraVENous Q8H    azithromycin  500 mg IntraVENous Q24H    spironolactone  25 mg Oral Daily    amiodarone  200 mg Oral BID    guaiFENesin  600 mg Oral BID    nystatin  5 mL Oral 4x Daily    amiodarone bolus  150 mg IntraVENous Once    aspirin  81 mg Oral Daily    budesonide-formoterol  2 puff Inhalation BID    fluticasone  1 spray Nasal Daily    pantoprazole  40 mg Oral QAM AC    rosuvastatin  20 mg Oral Daily    levalbuterol  1.25 mg Nebulization Q4H WA    sodium chloride flush  5-40 mL IntraVENous 2 times per day    enoxaparin  40 mg SubCUTAneous Daily    ipratropium  0.5 mg Nebulization Q4H WA     Continuous Infusions:   sodium chloride         Patient's current medications documented, reviewed, and updated. CBC: No results for input(s): WBC, HGB, PLT in the last 72 hours. BMP:    Recent Labs     01/06/22  0430      K 4.1   CL 99   CO2 36*   BUN 24*   CREATININE 0.62*   GLUCOSE 137*     Hepatic: No results for input(s): AST, ALT, ALB, BILITOT, ALKPHOS in the last 72 hours. Troponin: No results for input(s): TROPONINI in the last 72 hours. BNP: No results for input(s): BNP in the last 72 hours. Lipids: No results for input(s): CHOL, HDL in the last 72 hours.     Invalid input(s): LDLCALCU  INR: No results for input(s): INR in the last 72 hours.      Objective:   Vitals: BP (!) 158/80   Pulse 64   Temp 97.7 °F (36.5 °C) (Oral)   Resp 18   Ht 5' 10\" (1.778 m)   Wt 178 lb 8 oz (81 kg)   SpO2 97%   BMI 25.61 kg/m²   General appearance: alert and cooperative with exam  HEENT: Head: Normocephalic, no lesions, without obvious abnormality. Eye: Normal external eye, conjunctiva, lids cornea, GENET. Nose: Normal external nose, mucus membranes and septum. Nose: Nasal oxygen on. Neck: no adenopathy and supple, symmetrical, trachea midline  Lungs: Diminished with scattered expiratory wheezing. Heart: regular rate and rhythm and S1, S2 normal  Abdomen: soft, non-tender; bowel sounds normal; no masses,  no organomegaly  Extremities: ACE wraps on the lower legs with no calf tenderness. Neurologic: Mental status: Alert, oriented, thought content appropriate    Assessment and Plan:   1.  Atrial fib with RVR - cardioverted with Amiodarone, Cardizem, and Digoxin but went back into a fib on 1/3/22 (rate controlled) but converted back to sinus.  Refuses NOAC secondary to worries of AAA rupture.  Placed on Lopressor 12.5 mg BID on 1/5/22.  ECHO showing normal LV function with an EF of 55%, moderate TR, mild MR, moderate severe pulmonary HTN, mildly enlarge RA / RV.     2.  COPD exacerbation - on IV Solumedrol and Xopenex / Atrovent treatments. On Mucinex, Acapella, and EZPAP.  On Symbicort.    CT chest revealed no PE, RLL consolidation nonspecific may represent atelectasis, pneumonia or aspiration.  Started on IV Rocephin upon admission with Zithromax added on 1/3/21.  Solumedrol increased to 40 mg every 8 hours on 1/3/21.  Rocephin changed to Zosyn on 1/5.       3.  Acute on chronic hypoxic respiratory failure - back to baseline nasal oxygen at 3.5 l/m which he uses at home.     4.  H/O AAA - CT abdomen/pelvis showed fusiform infrarenal abdominal aortic aneurysm 3.9 x 3.5 cm, not much changed from 4/15/2021.  Another aneurysm superiorly measuring 3.7 x 2.8 cm also containing moderate amount of thrombus not changed from 4/15/2021.     5.  Chronic anemia - stable.       6.  H/O neck mass - no abnormality seen on CT scan of the neck.       7.  Weakness - PT / OT ordered.     8. Sinus bradycardia on 1/4/21 - Cardizem / Coreg held. Start on Lopressor 12.5 mg two times a day and doing well. Plan:  1. Continue the current treatment. 2.  Increase activity as tolerated. 3.  Plan on DC home tomorrow. 4.  Hydralazine 10 mg IV Q6 PRN for SBP > 150 (Elevation in BP expected with fluid retention with steroids). Patient continues to require in patient admission secondary to the need for IV antibiotics, IV steroids, and frequent aerosols.     DVT prophylaxis:   [x] Lovenox   [] SCDs   [] SQ Heparin   [] Encourage ambulation, low risk for DVT, no chemical or mechanical    prophylaxis necessary      [] Already on Anticoagulation    Patient Active Problem List:     COPD     Pure hypercholesterolemia     Osteoarthritis     Chest pain     Essential hypertension     Positive FIT (fecal immunochemical test)     Aneurysm of suprarenal aorta (HCC)     Paroxysmal atrial fibrillation (HCC)     Atrial fibrillation with RVR ([address]      Jennifer Suggs MD, MD  [facility]

## 2022-01-06 NOTE — DISCHARGE SUMMARY
Hospitalist Discharge Summary    Gaviota Trejo  :  1950  MRN:  [id number]    Admit date:  2021  Discharge date:      Admitting Physician:  Mackenzie Sylvester MD    Discharge Diagnoses:   Atrial fibrillation with RVR      COPD exacerbation      Acute on chronic hypoxic respiratory failure      Possible Pneumonia vs atelectasis on CT chest      H/O AAA      Chronic anemia      Weakness      Sinus bradycardia                Admission Condition:  poor      Discharged Condition:  good    Hospital Course: The patient is a 66 y.o. male presented to the emergency room complaining of worsening shortness of breath, generalized weakness, hypoxia. The patient has a history of severe COPD with chronic respiratory failure and usually using around 3.5 L of oxygen continuously. He noticed that his shortness of breath became worse about 1 month ago and since then he has been progressively getting worse. Last several days it became more constant, severe. He had associated generalized weakness, some chest heaviness. He has been coughing but not more than usual.  Had no fevers or chills. Had episodes of dizziness and almost passing out. Denies abdominal pain, nausea/vomiting, diarrhea. Despite his severe COPD he has not been vaccinated for Covid. Patient states that his oxygen saturation was low at home with the symptoms he presented to the emergency room. Per EMS the patient was 80% on room air, placed on 6 L with improvement of sats to 97. Patient's work-up in the emergency room revealed tachycardia with heart rate in 140s. Oxygen saturation was 74% on room air but improved to 95 and 3.5 L. He was afebrile. EKG revealed atrial flutter with RVR. Chest x-ray revealed mild interstitial prominence within the lungs partially in the mid to lower lung zones not significantly changed, no pleural effusion or pneumothorax. WBC count was 11.9, H&H 10.3/32.2, BMP was unremarkable. Troponin was less than 6.   COVID-19 was negative. Patient's case was discussed with his cardiologist Dr. Serafin Queen by ER physician. He received amiodarone 150 mg x 1 he initially improved, however he went back into atrial flutter with RVR with heart rate ranging from 75-1 45. Patient then was treated with 60 mg of oral Cardizem, given 10 mg IV Cardizem. Unfortunately continued to have tachycardia. Received IV digoxin. He was also treated with IV steroids and breathing treatments for COPD exacerbation. Eventually his heart rate stabilized and he was admitted for further management. Damien Redd was admitted with IV hydration, IV Solumedrol, IV Rocephin, and later added on IV Zithromax. Damien Redd initially converted back to sinus rhythm but later went back into afib with RVR. Dr. Serafin Queen in Cardiology was consulted. He was given IV Amiodarone and converted back to sinus. Oral amiodarone was continued. Anticoagulation was discussed with Damien Redd and his wife but they refused with the fear of his AAA rupturing. Damien Redd did have a CTA of the chest which was negative for PE but showed an infiltrate in the LLL suggestive of pneumonia vs atelectasis. He has a history of neck mass so a CT scan was performed of the neck which was negative for mass or occlusion. An ECHO was performed showing Normal LV function with EF 55%, Mild MR, Moderate TR,Moderate severe pulmonary hypertension, which may be exacerbated by his current pulmonary infection, Mild dilatation of RV and RA, No pericardial effusion. During his admission he was found to have bradycardia with his HR dropping into the 30's. Cardizem and Coreg was placed on hold with his heart rate recovering. Dr. Serafin Queen added him on Lopressor 12.5 mg BID. Damien Redd continued to have a productive cough, so on 1/5/22, Rocephin was changed to IV Zosyn which seemed to significantly improve his sputum production, wheezing, and SOB.   With Dayday's improvement it was felt he could be discharged home with close out patient follow up with his PCP and Dr. Roddy Chan. Discharge Exam:    Vitals: BP (!) 152/67   Pulse 68   Temp 97.9 °F (36.6 °C) (Oral)   Resp 18   Ht 5' 10\" (1.778 m)   Wt 178 lb 8 oz (81 kg)   SpO2 94%   BMI 25.61 kg/m²   General appearance: alert and cooperative with exam  HEENT: Head: Normocephalic, no lesions, without obvious abnormality. Eye: Normal external eye, conjunctiva, lids cornea, GENET. Nose: Normal external nose, mucus membranes and septum. Nose: Nasal oxygen on. Neck: no adenopathy and supple, symmetrical, trachea midline  Lungs: Clear posteriorly but diminished with a few scattered wheezes anteriorly. Heart: regular rate and rhythm and S1, S2 normal  Abdomen: soft, non-tender; bowel sounds normal; no masses,  no organomegaly  Extremities: ACE wraps on lower legs. No calf tenderness. Neurologic: Mental status: Alert, oriented, thought content appropriate    Discharge Medications:         Medication List      START taking these medications    amiodarone 200 MG tablet  Commonly known as: CORDARONE  Take 1 tablet by mouth daily     amoxicillin-clavulanate 875-125 MG per tablet  Commonly known as: Augmentin  Take 1 tablet by mouth 2 times daily for 7 days     losartan 50 MG tablet  Commonly known as: COZAAR  Take 1 tablet by mouth daily     metoprolol tartrate 25 MG tablet  Commonly known as: LOPRESSOR  Take 1 tablet by mouth 2 times daily     spironolactone 25 MG tablet  Commonly known as: ALDACTONE  Take 1 tablet by mouth daily        CHANGE how you take these medications    predniSONE 20 MG tablet  Commonly known as: DELTASONE  3 tabs daily for 2 days then 2 tabs daily for 2 days then 1 tab daily for 2 days.   What changed:   · how much to take  · how to take this  · when to take this  · additional instructions        CONTINUE taking these medications    albuterol (2.5 MG/3ML) 0.083% nebulizer solution  Commonly known as: PROVENTIL     albuterol 90 MCG/ACT inhaler  Commonly known as: PROVENTIL;VENTOLIN     aspirin 81 MG EC tablet     fluticasone 50 MCG/ACT nasal spray  Commonly known as: Flonase  1 spray by Nasal route daily     furosemide 20 MG tablet  Commonly known as: LASIX     omeprazole 20 MG delayed release capsule  Commonly known as: PRILOSEC     OXYGEN     rosuvastatin 20 MG tablet  Commonly known as: CRESTOR     Spiriva HandiHaler 18 MCG inhalation capsule  Generic drug: tiotropium     Symbicort 160-4.5 MCG/ACT Aero  Generic drug: budesonide-formoterol        STOP taking these medications    carvedilol 25 MG tablet  Commonly known as: COREG     dilTIAZem 180 MG extended release capsule  Commonly known as: Cardizem CD           Where to Get Your Medications      These medications were sent to [address] [facility], [address] [facility]  [address] [facility] ,   [facility] [address]    Phone: [phone]   · amiodarone 200 MG tablet  · amoxicillin-clavulanate 875-125 MG per tablet  · losartan 50 MG tablet  · metoprolol tartrate 25 MG tablet  · predniSONE 20 MG tablet  · spironolactone 25 MG tablet         Consults:  Dr. Oliva Mathis    Significant Diagnostic Studies:  Labs, CXR, CT chest / neck, ECHO    Treatments:   IV Normal Saline, IV Rocephin changed to Zosyn, IV Zithromax, IV Solumedrol, Xopenex aerosols, Acapella, EZPAP, Mucinex, IV Amiodarone, Nasal oxygen. Disposition:   Home. Discharge Instructions:  Resume previous home medication but discontinue Coreg and Cardizem. Take Amiodarone 200 mg daily. Take Lopressor 25 m tablet two times a day. Take Aldactone 25 mg daily. Take Losartan 50 mg daily. Take Augmentin to 875 mg two times a day x 7 days. Take the prednisone taper as directed on the prescription. The prednisone is very bitter so swallow the tablets quickly to prevent  dissolving in the mouth. After taking, don't lay  down for 2 hours to help prevent reflux. Continue oxygen as previously used.     27 day event monitor to be placed at time of discharge. Activity:  As tolerated. Diet:  Cardiac. Follow up with Indio Coelho DO in 1 week. Discharge time =  38 minutes.      Signed:  Ousmane Mercer MD  1/7/2022, 3:48 AM

## 2022-01-06 NOTE — PROGRESS NOTES
Spoke with wife this p.m. via telephone conversation, re:  Patient tentative discharge for tomorrow. Wife has arranged for transportation through the [place] for between 10-10:30 am tomorrow. Wife has also arranged home health services through the [place] including \"PA, nurse, dietician, . An Rodriguez the whole team.\"  Wife voices no additional needs at this time. Offered assistance as needed. LSW advised floor nurse, Lobito Parada, of above conversation. Will remain involved and assist as needs/concerns arise.     [facility] [address]  1/6/2022

## 2022-01-06 NOTE — PROGRESS NOTES
Cardiology    He still has cough but overall improving. BP high at 150's. HR sinus at 70 bpm.    Currently on Lopressor 25 bid, amiodarone 200 mg daily and will add losartan 50 mg daily. Would place 30 day event recorder on discharge. Will do cst as outpatient in 4 weeks and follow up in 5-6 weeks. I agree with home tomorrow. Will monitor bp today and in the morning.     Jeancarlos Rubin MD

## 2022-01-07 VITALS
HEART RATE: 69 BPM | OXYGEN SATURATION: 96 % | SYSTOLIC BLOOD PRESSURE: 150 MMHG | RESPIRATION RATE: 18 BRPM | WEIGHT: 178.5 LBS | DIASTOLIC BLOOD PRESSURE: 71 MMHG | BODY MASS INDEX: 25.56 KG/M2 | HEIGHT: 70 IN | TEMPERATURE: 97.8 F

## 2022-01-07 PROCEDURE — 6370000000 HC RX 637 (ALT 250 FOR IP): Performed by: INTERNAL MEDICINE

## 2022-01-07 PROCEDURE — 94669 MECHANICAL CHEST WALL OSCILL: CPT

## 2022-01-07 PROCEDURE — 2580000003 HC RX 258: Performed by: INTERNAL MEDICINE

## 2022-01-07 PROCEDURE — 6360000002 HC RX W HCPCS: Performed by: INTERNAL MEDICINE

## 2022-01-07 PROCEDURE — 94761 N-INVAS EAR/PLS OXIMETRY MLT: CPT

## 2022-01-07 PROCEDURE — 2700000000 HC OXYGEN THERAPY PER DAY

## 2022-01-07 PROCEDURE — 94640 AIRWAY INHALATION TREATMENT: CPT

## 2022-01-07 RX ORDER — AMOXICILLIN AND CLAVULANATE POTASSIUM 875; 125 MG/1; MG/1
1 TABLET, FILM COATED ORAL 2 TIMES DAILY
Qty: 14 TABLET | Refills: 0 | Status: SHIPPED | OUTPATIENT
Start: 2022-01-07 | End: 2022-01-14

## 2022-01-07 RX ORDER — AMIODARONE HYDROCHLORIDE 200 MG/1
200 TABLET ORAL DAILY
Qty: 30 TABLET | Refills: 5 | Status: SHIPPED | OUTPATIENT
Start: 2022-01-07 | End: 2022-02-21 | Stop reason: ALTCHOICE

## 2022-01-07 RX ORDER — PREDNISONE 20 MG/1
TABLET ORAL
Qty: 12 TABLET | Refills: 0 | Status: SHIPPED | OUTPATIENT
Start: 2022-01-07 | End: 2022-01-16

## 2022-01-07 RX ORDER — SPIRONOLACTONE 25 MG/1
25 TABLET ORAL DAILY
Qty: 30 TABLET | Refills: 3 | Status: SHIPPED | OUTPATIENT
Start: 2022-01-07 | End: 2022-08-15 | Stop reason: SINTOL

## 2022-01-07 RX ORDER — LOSARTAN POTASSIUM 50 MG/1
50 TABLET ORAL DAILY
Qty: 30 TABLET | Refills: 3 | Status: SHIPPED | OUTPATIENT
Start: 2022-01-07

## 2022-01-07 RX ADMIN — AZITHROMYCIN MONOHYDRATE 500 MG: 500 INJECTION, POWDER, LYOPHILIZED, FOR SOLUTION INTRAVENOUS at 03:39

## 2022-01-07 RX ADMIN — IPRATROPIUM BROMIDE 0.5 MG: 0.5 SOLUTION RESPIRATORY (INHALATION) at 08:54

## 2022-01-07 RX ADMIN — LEVALBUTEROL HYDROCHLORIDE 1.25 MG: 1.25 SOLUTION RESPIRATORY (INHALATION) at 08:54

## 2022-01-07 RX ADMIN — PIPERACILLIN SODIUM AND TAZOBACTAM SODIUM 3375 MG: 3; .375 INJECTION, POWDER, LYOPHILIZED, FOR SOLUTION INTRAVENOUS at 05:25

## 2022-01-07 RX ADMIN — BUDESONIDE AND FORMOTEROL FUMARATE DIHYDRATE 2 PUFF: 160; 4.5 AEROSOL RESPIRATORY (INHALATION) at 08:53

## 2022-01-07 RX ADMIN — IPRATROPIUM BROMIDE 0.5 MG: 0.5 SOLUTION RESPIRATORY (INHALATION) at 05:29

## 2022-01-07 RX ADMIN — METOPROLOL TARTRATE 25 MG: 25 TABLET, FILM COATED ORAL at 08:27

## 2022-01-07 RX ADMIN — ASPIRIN 81 MG: 81 TABLET, COATED ORAL at 08:27

## 2022-01-07 RX ADMIN — LEVALBUTEROL HYDROCHLORIDE 1.25 MG: 1.25 SOLUTION RESPIRATORY (INHALATION) at 05:29

## 2022-01-07 RX ADMIN — HYDRALAZINE HYDROCHLORIDE 10 MG: 20 INJECTION INTRAMUSCULAR; INTRAVENOUS at 03:27

## 2022-01-07 RX ADMIN — PANTOPRAZOLE SODIUM 40 MG: 40 TABLET, DELAYED RELEASE ORAL at 06:33

## 2022-01-07 RX ADMIN — LOSARTAN POTASSIUM 50 MG: 50 TABLET, FILM COATED ORAL at 08:27

## 2022-01-07 RX ADMIN — FLUTICASONE PROPIONATE 1 SPRAY: 50 SPRAY, METERED NASAL at 08:30

## 2022-01-07 RX ADMIN — ENOXAPARIN SODIUM 40 MG: 100 INJECTION SUBCUTANEOUS at 08:27

## 2022-01-07 RX ADMIN — NYSTATIN 500000 UNITS: 100000 SUSPENSION ORAL at 08:27

## 2022-01-07 RX ADMIN — SPIRONOLACTONE 25 MG: 25 TABLET, FILM COATED ORAL at 08:27

## 2022-01-07 RX ADMIN — AMIODARONE HYDROCHLORIDE 200 MG: 200 TABLET ORAL at 08:27

## 2022-01-07 RX ADMIN — FUROSEMIDE 20 MG: 20 TABLET ORAL at 08:29

## 2022-01-07 RX ADMIN — METHYLPREDNISOLONE SODIUM SUCCINATE 40 MG: 40 INJECTION, POWDER, FOR SOLUTION INTRAMUSCULAR; INTRAVENOUS at 06:33

## 2022-01-07 RX ADMIN — GUAIFENESIN 600 MG: 600 TABLET ORAL at 08:27

## 2022-01-07 ASSESSMENT — PAIN SCALES - GENERAL
PAINLEVEL_OUTOF10: 0
PAINLEVEL_OUTOF10: 0

## 2022-01-07 NOTE — PROGRESS NOTES
Patient to discharge home today. [facility] per wife. Patient also to have home care services coordinated by wife through the [place].     [address]  1/7/2022

## 2022-01-07 NOTE — PROGRESS NOTES
Discharge instructions given to patient and wife with special attention to medications and follow up appointments. RT into explain cardiac event monitor. Both verbalize understanding.

## 2022-01-07 NOTE — DISCHARGE INSTR - COC
Continuity of Care Form    Patient Name: Cameron Nunez   :    MRN:  [id number]    Admit date:  2021  Discharge date:  2022    Code Status Order: Full Code   Advance Directives:      Admitting Physician:  Tracey Berkowitz MD  PCP: Jennifer Tobias DO    Discharging Nurse: [facility] Unit/Room#: 8347/8532-43  Discharging Unit Phone Number: [phone]    Emergency Contact:   Extended Emergency Contact Information  Primary Emergency Contact: Asiya Lee  Address: [address]           Amanda Pineda 17 [address] Phone: [phone]  Relation: Spouse  Secondary Emergency Contact: Yonas Solano [address] Phone: [phone]  Relation: Child    Past Surgical History:  Past Surgical History:   Procedure Laterality Date    CARDIAC CATHETERIZATION Left 08/05/15    Dr. Fanta Souza 30% disease of the left main trunck, Mild plaque disease in the coronary arteries.   Normal LV function, ejection fraction of 60%    EYE SURGERY         Immunization History:   Immunization History   Administered Date(s) Administered    Influenza Vaccine, unspecified formulation 10/08/2015, 2016    Pneumococcal Conjugate 13-valent (Lenetta Primrose) 10/08/2015    Pneumococcal Polysaccharide ([id number]) 2017       Active Problems:  Patient Active Problem List   Diagnosis Code    COPD J44.9    Pure hypercholesterolemia E78.00    Osteoarthritis M19.90    Chest pain R07.9    Essential hypertension I10    Positive FIT (fecal immunochemical test) R19.5    Aneurysm of suprarenal aorta (HCC) I71.4    Paroxysmal atrial fibrillation ([address] I48.0    Atrial fibrillation with RVR ([address] I48.91       Isolation/Infection:   Isolation            No Isolation          Patient Infection Status       Infection Onset Added Last Indicated Last Indicated By Review Planned Expiration Resolved Resolved By    None active    Resolved    COVID-19 (Rule Out) 21 COVID-19, Rapid (Ordered)   12/31/21 Rule-Out Test Resulted    COVID-19 (Rule Out) 12/15/21 12/15/21 12/15/21 COVID-19, Rapid (Ordered)   12/15/21 Rule-Out Test Resulted    COVID-19 (Rule Out) 09/05/21 09/05/21 09/05/21 COVID-19, Rapid (Ordered)   09/05/21 Rule-Out Test Resulted    COVID-19 (Rule Out) 05/17/21 05/17/21 05/17/21 COVID-19, Rapid (Ordered)   05/17/21 Rule-Out Test Resulted    COVID-19 (Rule Out) 12/28/20 12/28/20 12/28/20 COVID-19, PCR (Ordered)   12/30/20 Rule-Out Test Resulted            Nurse Assessment:  Last Vital Signs: BP (!) 150/71   Pulse 69   Temp 97.8 °F (36.6 °C) (Oral)   Resp 17   Ht 5' 10\" (1.778 m)   Wt 178 lb 8 oz (81 kg)   SpO2 94%   BMI 25.61 kg/m²     Last documented pain score (0-10 scale): Pain Level: 0  Last Weight:   Wt Readings from Last 1 Encounters:   12/31/21 178 lb 8 oz (81 kg)     Mental Status:  oriented and alert    IV Access:  - None    Nursing Mobility/ADLs:  Walking   Independent  Transfer  Independent  Bathing  Independent  Dressing  Independent  Toileting  Independent  Feeding  [address]  Independent  Med Delivery   whole    Wound Care Documentation and Therapy:        Elimination:  Continence: Bowel: Yes  Bladder: Yes  Urinary Catheter: None   Colostomy/Ileostomy/Ileal Conduit: No       Date of Last BM: 1/6/2022    Intake/Output Summary (Last 24 hours) at 1/7/2022 0837  Last data filed at 1/6/2022 1739  Gross per 24 hour   Intake 920 ml   Output --   Net 920 ml     I/O last 3 completed shifts:   In: 1560 [P.O.:1560]  Out: -     Safety Concerns:     None    Impairments/Disabilities:      Vision    Nutrition Therapy:  Current Nutrition Therapy:   Diet-Cardiac      Routes of Feeding: Oral  Liquids: No Restrictions  Daily Fluid Restriction: no  Last Modified Barium Swallow with Video (Video Swallowing Test): not done    Treatments at the Time of Hospital Discharge:   Respiratory Treatments: oxygen  Oxygen Therapy:  is on oxygen at 3.5 L/min per nasal cannula. Ventilator:    - No ventilator support    Rehab Therapies: Physical Therapy  Weight Bearing Status/Restrictions: No weight bearing restirctions  Other Medical Equipment (for information only, NOT a DME order):  none  Other Treatments: ***    Patient's personal belongings (please select all that are sent with patient):  Clothing and O2 tank    RN SIGNATURE:  Electronically signed by Francesco Lindo RN on 1/7/22 at 8:40 AM EST    CASE MANAGEMENT/SOCIAL WORK SECTION    Inpatient Status Date: ***    Readmission Risk Assessment Score:  Readmission Risk              Risk of Unplanned Readmission:  22           Discharging to Facility/ Agency   Name:   Address:  Phone:  Fax:    Dialysis Facility (if applicable)   Name:  Address:  Dialysis Schedule:  Phone:  Fax:    / signature: {Esignature:[id number]}    PHYSICIAN SECTION    Prognosis: {Prognosis:5025796807}    Condition at Discharge: Mary Ann Laguerre Patient Condition:613167168}    Rehab Potential (if transferring to Rehab): {Prognosis:4132143852}    Recommended Labs or Other Treatments After Discharge: ***    Physician Certification: I certify the above information and transfer of Issa Rolle  is necessary for the continuing treatment of the diagnosis listed and that he requires {Admit to Appropriate Level of Care:32062} for {GREATER/LESS:667174760} 30 days.      Update Admission H&P: {CHP DME Changes in LYNVQ:620172032}    PHYSICIAN SIGNATURE:  {Esignature:609138706}

## 2022-01-07 NOTE — PROGRESS NOTES
Hospitalist Progress Note  1/7/2022 3:44 AM  Subjective:   Admit Date: 12/31/2021  PCP: Monika Cherry, DO    Interval History: Valentin Arreaga feels he is doing a lot better. Less wheezing and he is not feeling as tight. No chest pain. Appetite is good, no nausea. Excited about going home today. Diet: ADULT DIET; Regular  Medications:   Scheduled Meds:   losartan  50 mg Oral Daily    amiodarone  200 mg Oral Daily    metoprolol tartrate  25 mg Oral BID    piperacillin-tazobactam  3,375 mg IntraVENous Q8H    furosemide  20 mg Oral Daily    methylPREDNISolone  40 mg IntraVENous Q8H    azithromycin  500 mg IntraVENous Q24H    spironolactone  25 mg Oral Daily    guaiFENesin  600 mg Oral BID    nystatin  5 mL Oral 4x Daily    amiodarone bolus  150 mg IntraVENous Once    aspirin  81 mg Oral Daily    budesonide-formoterol  2 puff Inhalation BID    fluticasone  1 spray Nasal Daily    pantoprazole  40 mg Oral QAM AC    rosuvastatin  20 mg Oral Daily    levalbuterol  1.25 mg Nebulization Q4H WA    sodium chloride flush  5-40 mL IntraVENous 2 times per day    enoxaparin  40 mg SubCUTAneous Daily    ipratropium  0.5 mg Nebulization Q4H WA     Continuous Infusions:   sodium chloride         Patient's current medications documented, reviewed, and updated. CBC: No results for input(s): WBC, HGB, PLT in the last 72 hours. BMP:    Recent Labs     01/06/22  0430      K 4.1   CL 99   CO2 36*   BUN 24*   CREATININE 0.62*   GLUCOSE 137*     Hepatic: No results for input(s): AST, ALT, ALB, BILITOT, ALKPHOS in the last 72 hours. Troponin: No results for input(s): TROPONINI in the last 72 hours. BNP: No results for input(s): BNP in the last 72 hours. Lipids: No results for input(s): CHOL, HDL in the last 72 hours. Invalid input(s): LDLCALCU  INR: No results for input(s): INR in the last 72 hours.       Objective:   Vitals: BP (!) 152/67   Pulse 68   Temp 97.9 °F (36.6 °C) (Oral)   Resp 18   Ht 5' 10\" (1.778 m)   Wt 178 lb 8 oz (81 kg)   SpO2 94%   BMI 25.61 kg/m²   General appearance: alert and cooperative with exam  HEENT: Head: Normocephalic, no lesions, without obvious abnormality. Eye: Normal external eye, conjunctiva, lids cornea, EGNET. Nose: Normal external nose, mucus membranes and septum. Nose: Nasal oxygen on. Neck: no adenopathy and supple, symmetrical, trachea midline  Lungs: Clear posteriorly but diminished with a few scattered wheezes anteriorly. Heart: regular rate and rhythm and S1, S2 normal  Abdomen: soft, non-tender; bowel sounds normal; no masses,  no organomegaly  Extremities: ACE wraps on lower legs. No calf tenderness. Neurologic: Mental status: Alert, oriented, thought content appropriate    Assessment and Plan:   1.  Atrial fib with RVR - cardioverted with Amiodarone, Cardizem, and Digoxin but went back into a fib on 1/3/22 (rate controlled) but converted back to sinus.  Refuses NOAC secondary to worries of AAA rupture.  Placed on Lopressor 12.5 mg BID on 1/5/22.  ECHO showing normal LV function with an EF of 55%, moderate TR, mild MR, moderate severe pulmonary HTN, mildly enlarge RA / RV.     2.  COPD exacerbation - on IV Solumedrol and Xopenex / Atrovent treatments.  On Mucinex, Acapella, and EZPAP.  On Symbicort.   CT chest revealed no PE, RLL consolidation nonspecific may represent atelectasis, pneumonia or aspiration.  Started on IV Rocephin upon admission with Zithromax added on 1/3/21.  Solumedrol increased to 40 mg every 8 hours on 1/3/21.  Rocephin changed to Zosyn on 1/5.       3.  Acute on chronic hypoxic respiratory failure - back to baseline nasal oxygen at 3.5 l/m which he uses at home.     4.  H/O AAA - CT abdomen/pelvis showed fusiform infrarenal abdominal aortic aneurysm 3.9 x 3.5 cm, not much changed from 4/15/2021.  Another aneurysm superiorly measuring 3.7 x 2.8 cm also containing moderate amount of thrombus not changed from 4/15/2021.     5.  Chronic anemia - stable.       6.  H/O neck mass - no abnormality seen on CT scan of the neck.       7.  Weakness - PT / OT ordered.     8.  Sinus bradycardia on 1/4/21 - Cardizem / Coreg held.  Start on Lopressor 12.5 mg two times a day and doing well. Plan:  Discharge home today with close out patient follow up.           DVT prophylaxis:   [x] Lovenox   [] SCDs   [] SQ Heparin   [] Encourage ambulation, low risk for DVT, no chemical or mechanical    prophylaxis necessary      [] Already on Anticoagulation    Patient Active Problem List:     COPD     Pure hypercholesterolemia     Osteoarthritis     Chest pain     Essential hypertension     Positive FIT (fecal immunochemical test)     Aneurysm of suprarenal aorta (HCC)     Paroxysmal atrial fibrillation (HCC)     Atrial fibrillation with RVR ([address]        Evita Iglesias MD, MD  [facility] Hospitalist

## 2022-02-01 ENCOUNTER — TELEPHONE (OUTPATIENT)
Dept: CARDIOLOGY CLINIC | Age: 72
End: 2022-02-01

## 2022-02-01 RX ORDER — METOPROLOL SUCCINATE 50 MG/1
50 TABLET, EXTENDED RELEASE ORAL DAILY
COMMUNITY

## 2022-02-01 NOTE — TELEPHONE ENCOUNTER
Edilma from the [place] called to inform us the dr franklyn pt metoprolol 25mg bid   To metoprolol succ 50mg daily.

## 2022-02-08 NOTE — PROCEDURES
[address] Bennie [address]                                 EVENT MONITOR    PATIENT NAME: Alexsandra Leong                      :        1950  MED REC NO:   [id number]                              ROOM:       7383  ACCOUNT NO:   [de-identified]                           ADMIT DATE: 2021  PROVIDER:     Hipolito Hair    NAME OF THE TEST:  Event recorder. REASON FOR TEST:  Paroxysmal atrial fibrillation. The patient wore an event recorder from 2022 to 2022. We  received innumerable recordings. His underlying rhythm was sinus  rhythm. However, he had multiple episodes of atrial fibrillation with RVR, with  heart rates up in the 130 range. He would then convert to sinus rhythm. He had no bradycardia. His atrial fibrillation was rather rapid in the  130 to 140 range most of the time. He was generally asymptomatic, although he could occasionally feel his  heart \"beating fast.\"    This is an abnormal event recorder with paroxysmal atrial fibrillation  and spontaneous conversion back to sinus rhythm. His rate was  relatively rapid and I would consider increasing his negative  chronotropic medications.         Saeed Sterling    D: 2022 20:58:45       T: 2022 1:35:17     MARIA [email or web address]  Job#: [id number]     Doc#: [id number]    CC:  Indio Coelho

## 2022-02-21 ENCOUNTER — OFFICE VISIT (OUTPATIENT)
Dept: CARDIOLOGY CLINIC | Age: 72
End: 2022-02-21
Payer: MEDICARE

## 2022-02-21 VITALS
DIASTOLIC BLOOD PRESSURE: 60 MMHG | OXYGEN SATURATION: 98 % | SYSTOLIC BLOOD PRESSURE: 100 MMHG | WEIGHT: 167 LBS | HEART RATE: 83 BPM | BODY MASS INDEX: 23.96 KG/M2

## 2022-02-21 DIAGNOSIS — E55.9 VITAMIN D DEFICIENCY DISEASE: ICD-10-CM

## 2022-02-21 DIAGNOSIS — I48.91 ATRIAL FIBRILLATION WITH RVR (HCC): Primary | ICD-10-CM

## 2022-02-21 DIAGNOSIS — E78.00 PURE HYPERCHOLESTEROLEMIA: ICD-10-CM

## 2022-02-21 DIAGNOSIS — I10 ESSENTIAL HYPERTENSION: ICD-10-CM

## 2022-02-21 PROCEDURE — 99214 OFFICE O/P EST MOD 30 MIN: CPT | Performed by: INTERNAL MEDICINE

## 2022-02-21 PROCEDURE — 3017F COLORECTAL CA SCREEN DOC REV: CPT | Performed by: INTERNAL MEDICINE

## 2022-02-21 PROCEDURE — 4040F PNEUMOC VAC/ADMIN/RCVD: CPT | Performed by: INTERNAL MEDICINE

## 2022-02-21 PROCEDURE — 1036F TOBACCO NON-USER: CPT | Performed by: INTERNAL MEDICINE

## 2022-02-21 PROCEDURE — 1123F ACP DISCUSS/DSCN MKR DOCD: CPT | Performed by: INTERNAL MEDICINE

## 2022-02-21 PROCEDURE — 1111F DSCHRG MED/CURRENT MED MERGE: CPT | Performed by: INTERNAL MEDICINE

## 2022-02-21 PROCEDURE — G8420 CALC BMI NORM PARAMETERS: HCPCS | Performed by: INTERNAL MEDICINE

## 2022-02-21 PROCEDURE — G8484 FLU IMMUNIZE NO ADMIN: HCPCS | Performed by: INTERNAL MEDICINE

## 2022-02-21 PROCEDURE — G8427 DOCREV CUR MEDS BY ELIG CLIN: HCPCS | Performed by: INTERNAL MEDICINE

## 2022-02-21 RX ORDER — DIGOXIN 125 MCG
125 TABLET ORAL DAILY
COMMUNITY
End: 2022-02-21 | Stop reason: SDUPTHER

## 2022-02-21 RX ORDER — DILTIAZEM HYDROCHLORIDE 180 MG/1
180 CAPSULE, COATED, EXTENDED RELEASE ORAL DAILY
COMMUNITY
End: 2022-02-21

## 2022-02-21 RX ORDER — DIGOXIN 125 MCG
125 TABLET ORAL DAILY
Qty: 30 TABLET | Refills: 11 | Status: SHIPPED | OUTPATIENT
Start: 2022-02-21

## 2022-02-21 NOTE — PROGRESS NOTES
Ov Dr. Itzel Cutler for hospital follow up  In and out of a fib  Pt states gets sob easy   On 3.5 lpm-  No chest pain   One time of dizziness   occ low bp             STOP AMIODARONE     IN 2 WEEKS START DIGOXIN 125 MCG  ONE TABLET DAILY     MONITOR BP/HR DAILY -DIFFERENT TIMES OF THE DAY     UPDATE US IN ONE MONTH WITH READINGS     FOLLOW UP IN 6 MONTHS

## 2022-02-21 NOTE — PATIENT INSTRUCTIONS
STOP AMIODARONE     IN 2 WEEKS START DIGOXIN 125 MCG  ONE TABLET DAILY     MONITOR BP/HR DAILY -DIFFERENT TIMES OF THE DAY     UPDATE US IN ONE MONTH WITH READINGS     FOLLOW UP IN 6 MONTHS       You may be receiving a survey from [employer] regarding your visit today. Please complete the survey to enable us to provide the highest quality of care to you and your family. If you cannot score us a very good on any question, please call the office to discuss how we could have made your experience a very good one.       JOEL Cui [place], LPN Dr.Gregory Pamelia Combes

## 2022-02-22 NOTE — PROGRESS NOTES
Abdulkadir Ortega M.D. [address] [place] [address] [place] [address]  [phone]          2022        Ramón Silva DO  [place], [place]  [facility]   [address] [facility], [address] [facility]    RE:   Eri Andersen  :  1950      Dear Dr. Juan Barakat:    CHIEF COMPLAINT:  1. Paroxysmal atrial fibrillation, with him in atrial fibrillation approximately third of the time. 2.  On amiodarone 200 mg daily. 3.  Unwilling to take any anticoagulation because of a 3.9 x 3.5 cm infrarenal aortic aneurysm. HISTORY OF PRESENT ILLNESS:  Mr. Eri Andersen is a pleasant 64-year-old gentleman who has a long history of paroxysmal atrial fibrillation. He also has severe chronic COPD and is on home oxygen at 3.5 liters per minute. He had a catheterization on 2015, that showed 30% to 40% disease in the right coronary artery, 30% left main trunk, unremarkable circumflex and LAD, with an EF of 60%. On 2017, he developed palpitations and an event recorder showed atrial fibrillation, which was a 4% burden. He saw a cardiologist at [facility] who recommended anticoagulation because of his CHADS score 3; however, he and his wife did not want to start. He has an aortic aneurysm measuring 3.9 cm, which has not changed. On 2020, he came to the emergency room because of heart rate in the 150 to 160 range. He was placed on Cardizem  mg daily. While in the emergency room, he converted to sinus rhythm. In the first of 2021, he began developing shortness of breath. He did have a cough of yellow sputum. He was placed on antibiotics. However, he developed marked increase in weakness and shortness of breath. He also was having some \"chest tightness. \"    He came to the emergency room. In the emergency room, he was initially in sinus rhythm.   However, he developed shortness of breath, then he developed atrial flutter with a heart rate in the 140s. He was admitted for bronchitis and paroxysmal atrial fibrillation with RVR. We had difficulty controlling his heart rate in the hospital.  He would have some episodes of atrial fib with RVR with also some bradycardia. He did not wish to start any anticoagulation, even though we started him on Eliquis in the hospital.    He was discharged on amiodarone 200 mg daily. We gave him the event recorder. He continues to have episodes of atrial fibrillation. The rates are fairly well controlled usually in the 100 to 110 range. He did have some 2-second pauses when he would convert from atrial fibrillation to sinus rhythm. He does get short of breath easy. He remains on oxygen at 3.5 liters per minute. He denies any chest pain or chest discomfort. He will occasionally have a somewhat low blood pressure. This morning at home it was 112, and in my office it is 98/60. His heart rate is controlled in the 60 to 70 and is regular today. He had blood work, EKG and chest x-ray at the [place], although I do not have those results as of yet. CARDIAC RISK FACTORS:  Known CAD:  Mildly positive. Hypertension:  Positive. Hyperlipidemia:  Positive. Peripheral Vascular Disease:  Positive. Smoking:  Positive. Other Family Members:  Positive. Diabetes:  Negative. MEDICATIONS AT HOME:  He is currently on Proventil inhaler p.r.n., Symbicort 160/4.5 two puffs b.i.d., Flonase p.r.n., Lasix 20 mg daily, Cozaar 50 mg daily, Toprol-XL 50 mg daily, Prilosec 20 mg b.i.d., oxygen at 3.5 liters per minute, Crestor 20 mg daily, Aldactone 25 mg daily, Spiriva daily. PAST MEDICAL AND SURGICAL HISTORY:  1. Cardiac as above. 2.  Hyperlipidemia. 3.  Chronic back pain. 4.  Prostate surgery. 5.  Hypertension. 6.  Renal artery stenosis. 7.  Severe COPD, on oxygen at 3.5 liters per minute at home. 8.  Eye surgery.   9.  Paroxysmal atrial fibrillation, not anticoagulated by his desire. 10.  A 3.9 x 3.5 infrarenal aortic aneurysm. FAMILY HISTORY:  Mother had cancer. Brother had cancer. Father had cancer. SOCIAL HISTORY:  He is 66years old,  for the second time. Has four children. Retired. He smokes half a pack daily. [facility] Does not [facility]. Very inactive. REVIEW OF SYSTEMS:  Cardiac as above. Other systems reviewed including constitutional, eyes, ears, nose and throat, cardiovascular, respiratory, GI, , musculoskeletal, integumentary, neurologic, endocrine, hematologic and allergic/immunologic are negative except for what is described above. No weight loss or weight gain. No change in bowel habits. No blood in stools. No fevers, sweats or chills. PHYSICAL EXAMINATION:  VITAL SIGNS:  His blood pressure was 98/60 with a heart rate of 80 and regular. Respiratory rate 18. O2 sat 98%. Weight 159 pounds. GENERAL:  He is a pleasant 64-year-old gentleman. Denied pain. He was oriented to person, place and time. Answered questions appropriately. SKIN:  No unusual skin changes. HEENT:  The pupils are equally round and intact. Mucous membranes were dry. NECK:  No JVD. Good carotid pulses. No carotid bruits. No lymphadenopathy or thyromegaly. CARDIOVASCULAR EXAM:  S1 and S2 were normal.  No S3 or S4. Soft systolic blowing type murmur. No diastolic murmur. PMI was normal.  No lift, thrust, or pericardial friction rub. LUNGS:  Quite clear to auscultation and percussion. ABDOMEN:  Soft and nontender. Good bowel sounds. EXTREMITIES:  Good femoral pulses. Good pedal pulses. No pedal edema. Skin was warm and dry. No calf tenderness. Nail beds pink. Good cap refill. PULSES:  Bilateral symmetrical radial, brachial and carotid pulses. No carotid bruits. Good femoral and pedal pulses. NEUROLOGIC EXAM:  Within normal limits. PSYCHIATRIC EXAM:  Within normal limits.     Echocardiogram on 01/03/2022, when he was in the hospital showed an EF of 55% with mild mitral regurgitation and moderate tricuspid regurgitation with a PA pressure estimated at 57 mmHg consistent with moderate-to-severe pulmonary hypertension. IMPRESSION:  1. Paroxysmal atrial fibrillation. 2.  Moderate-to-severe pulmonary hypertension with a PA pressure of 57 mmHg. 3.  Normal LV function, EF of 55%. 4.  A 3.5 x 3.9 infrarenal aortic aneurysm with thrombus present. 5.  Not on anticoagulation by his desire, even though he has a CHADS score of 3.  6.  On amiodarone 200 mg daily, with him still having episodes of atrial fibrillation approximately 30% of the time. 7.  Hypertension, although occasionally hypotension such as today. 8.  Continued smoking abuse. 9.  Severe COPD, on oxygen at 3.5 liters per minute. PLAN:  1. We will stop amiodarone as it is not keeping him in rhythm and because he has severe _____. 2. In two weeks, we will start digoxin 0.125 mg daily. 3.  He will monitor his blood pressure and heart rate daily at different times of the day and call us in one month with the readings. 4.  We will follow up in 6 months. DISCUSSION:  Mr. Jefferson Hernadez is at a high risk for CVA with his atrial fibrillation. He goes in and out of atrial fibrillation and is in it 20% to 30% of the time. His aortic aneurysm really has not changed and it is 3.5 to 3.9 cm. However, because of his aneurysm, they are afraid to start any anticoagulation. His blood pressure was low today. I had initially planned on placing him back on his diltiazem 180 mg daily, but with his blood pressure being low, I hesitate to do this. Therefore, we will start digoxin 0.125 mg daily. We will start this in two weeks as he is currently on amiodarone and I am afraid he will get bradycardic until amiodarone tapers off. I went over several times that the largest risk of his atrial fibrillation is a CVA. They do not wish to start any anticoagulation.   They understand the risks. I will plan on seeing with him in 6 months. I do not have any of the blood work or EKG or chest x-ray from the [place], hopefully we will get that soon. Thank you very much for allowing me the privilege of seeing Mr. Richard Craig. If you have any questions on my thoughts, please do not hesitate to contact me.     Sincerely,        Pat Falk    D: 02/21/2022 13:28:47     T: 02/21/2022 13:33:46     MARIA [email or web address]  Job#: [id number]   Doc#: [id number]

## 2022-03-01 ENCOUNTER — TELEPHONE (OUTPATIENT)
Dept: CARDIOLOGY CLINIC | Age: 72
End: 2022-03-01

## 2022-03-07 ENCOUNTER — TELEPHONE (OUTPATIENT)
Dept: CARDIOLOGY CLINIC | Age: 72
End: 2022-03-07

## 2022-03-07 NOTE — TELEPHONE ENCOUNTER
Chitra Sexton called to state that today makes 2 weeks that Viji Brock has been off Amiodarone. She stated that Viji Brock was supposed to start Digoxin today, but she is afraid to give it to him because his BP is low. This /57 Pulse 64  This AM 89/48  Pulse 62    Yesterday  103/61   Pulse 61    115/70    97/54    Please call Asiya. .She will not give him Digoxin until she knows for sure.

## 2022-03-10 ENCOUNTER — TELEPHONE (OUTPATIENT)
Dept: CARDIOLOGY CLINIC | Age: 72
End: 2022-03-10

## 2022-03-10 NOTE — TELEPHONE ENCOUNTER
Per DR Carmen Lala to hold cozaar for now  Hold lasix and aldactone tomorrow   Resume Saturday   To check bp and hr for a week and call with update

## 2022-03-10 NOTE — TELEPHONE ENCOUNTER
Noris Goldberg called to state that Arringtontony Ledbetter is in Afib. She stated that his BP is very low. She stated that Chino Anatoly did start Digoxin as directed.     This AM  99/54 HR  116    103/56  108     84/48  73      Present 72/44  117    Please call Noris Goldberg

## 2022-03-22 ENCOUNTER — TELEPHONE (OUTPATIENT)
Dept: CARDIOLOGY CLINIC | Age: 72
End: 2022-03-22

## 2022-03-22 NOTE — TELEPHONE ENCOUNTER
Sharron Sat stopped by the office to state that Cyril Peña has been off Losartan for almost 2 weeks now and his BP is averaging 114/57 and pulse 60. She states that sometimes it is as low as 107/67. She wanted to check to see if Dr. Roddy Chan wants him to restart or stay off. She stated that she trys to keep a good check on it and he is feeling better. Please call Asiya.

## 2022-06-24 NOTE — ED NOTES
Pt discharge instructions given, pt and pt wife verbalized understanding. Pt is waiting for daughter to bring his home O2 tank for transport home.       Abhilash Carpenter RN  12/31/21 [id number]
Pt returns to ED with Wife. Pt wife states, \"his heart rate came up to 145 and I don't feel comfortable taking him home like this because the same thing is going to happen, so he is coming back in.\"  Pt brought back in via wheelchair and taken to room 8. Pt wife remains at bedside, Dr. Greg Quinteros aware.       Sheri Hunt RN  12/31/21 0432
Pt wife and son arrive with dinner for patient. Pt sitting up in bed. Call light in reach. No distress noted.       Dearmichelle Turner RN  12/31/21 1932
25-Jun-2022

## 2022-08-15 ENCOUNTER — OFFICE VISIT (OUTPATIENT)
Dept: CARDIOLOGY CLINIC | Age: 72
End: 2022-08-15
Payer: MEDICARE

## 2022-08-15 VITALS
BODY MASS INDEX: 23.24 KG/M2 | SYSTOLIC BLOOD PRESSURE: 120 MMHG | RESPIRATION RATE: 66 BRPM | DIASTOLIC BLOOD PRESSURE: 60 MMHG | WEIGHT: 162 LBS | OXYGEN SATURATION: 92 %

## 2022-08-15 DIAGNOSIS — I71.40 ANEURYSM OF SUPRARENAL AORTA: ICD-10-CM

## 2022-08-15 DIAGNOSIS — E78.00 PURE HYPERCHOLESTEROLEMIA: Primary | ICD-10-CM

## 2022-08-15 DIAGNOSIS — R07.9 CHEST PAIN, UNSPECIFIED TYPE: ICD-10-CM

## 2022-08-15 DIAGNOSIS — I48.0 PAROXYSMAL ATRIAL FIBRILLATION (HCC): ICD-10-CM

## 2022-08-15 DIAGNOSIS — I10 ESSENTIAL HYPERTENSION: ICD-10-CM

## 2022-08-15 PROCEDURE — G8420 CALC BMI NORM PARAMETERS: HCPCS | Performed by: INTERNAL MEDICINE

## 2022-08-15 PROCEDURE — 1123F ACP DISCUSS/DSCN MKR DOCD: CPT | Performed by: INTERNAL MEDICINE

## 2022-08-15 PROCEDURE — 3017F COLORECTAL CA SCREEN DOC REV: CPT | Performed by: INTERNAL MEDICINE

## 2022-08-15 PROCEDURE — 1036F TOBACCO NON-USER: CPT | Performed by: INTERNAL MEDICINE

## 2022-08-15 PROCEDURE — 99214 OFFICE O/P EST MOD 30 MIN: CPT | Performed by: INTERNAL MEDICINE

## 2022-08-15 PROCEDURE — G8428 CUR MEDS NOT DOCUMENT: HCPCS | Performed by: INTERNAL MEDICINE

## 2022-08-15 RX ORDER — EPLERENONE 25 MG/1
25 TABLET, FILM COATED ORAL DAILY
Qty: 30 TABLET | Refills: 11 | Status: SHIPPED | OUTPATIENT
Start: 2022-08-15

## 2022-08-15 NOTE — PATIENT INSTRUCTIONS
Discontinue Spironolactone   Start Inspra 25mg -1 tablet daily  Add Cardiazem 30 mg --Ok to take when HR is over 150  Ok to take every 1/2 hour for up to 4 doses  See in 1 year  With labs/ekg/cxr

## 2022-08-15 NOTE — PROGRESS NOTES
No chest pain  SOB  Tracks HR /BP at home  No hospitalizations   At [facility] last week  Will do Labs at [facility] on 8/23  Noticed breast hardness/swelling  3 weeks ago  Anemic will see Oncology /Hematology   Ultrasound/mammogram at [facility]  Weight stays about the same  Change from Spironolactone to Inspira 25 mg once a day  Feels breast hardness/tenderness is from   Spironolactone  Echo done in room no changes  See in 1 year   Unless needs to be seen sooner  Will prescribe Cardiazem 30 mg for AFIB if rate above 150 (and no chest pain)  May take up to 4 doses

## 2022-08-15 NOTE — LETTER
Tiffanie Romero M.D. [address] [place] [address] [facility]  [phone]          August 15, 2022          Amanda Solders, [address]  224 [facility], [address] [place] [address]      RE:   Paco Mcgowan  :  1950      Dear Dr. Jackson Reyes:    CHIEF COMPLAINT:  Gynecomastia secondary to spironolactone. HISTORY OF PRESENT ILLNESS:  I had the pleasure of seeing Mr. Paco Mcgowan in our office on 08/15/2022. He is a pleasant 68-year-old gentleman who has a history of paroxysmal atrial fibrillation. We had had him on amiodarone, but he continued to have episodes of atrial fibrillation. Therefore, I stopped amiodarone and placed him on rate control. He has never wanted to be anticoagulated because of a 3.9 x 3.5 cm infrarenal aortic aneurysm, which has been very stable. He does have chronic COPD and is on home oxygen at 3.5 liters per minute. He had a catheterization on 2015, that showed 30% to 40% disease in the right coronary artery, 30% left main trunk, unremarkable circumflex and LAD, with an EF of 60%, medical therapy recommended. On 2017, he developed palpitations and an event recorder showed atrial fibrillation. He saw a cardiologist at [facility] who recommended anticoagulation because of CHADS score of 3; however, he did not want to start. I saw him for a second opinion regarding anticoagulation. He had more episodes of chest pain and shortness of breath and was hospitalized in 2021. He was placed on antibiotics. He also developed episodes of atrial flutter. We placed him on Eliquis 5 mg b.i.d. but he did not wish to stay on Eliquis. We also discharged him on amiodarone 200 mg daily. Again, he had episodes of atrial fibrillation with amiodarone and I stopped this when I saw him on 2022.     He had significant edema, which had been controlled with Lasix 20 mg daily along with Aldactone 25 mg daily. In the last three weeks, he began noticing hardness and swelling in both breasts. He contacted you and you have ordered an ultrasound and a mammogram.    He has had no unusual chest pain. He has chronic shortness of breath, although it seems to be no change from previously. He denies any chest pain or chest discomfort. He is seeing oncology and hematology for anemia in the future. His weight seems about the same. He has had no syncope or near syncope. He can feel when he goes in atrial fibrillation but denies chest pain. CARDIAC RISK FACTORS:  Known CAD:  Mildly positive. Hypertension:  Positive. Hyperlipidemia:  Positive. Peripheral Vascular Disease:  Positive. Smoking:  Positive. Other Family Members:  Positive. Diabetes:  Negative. MEDICATIONS AT HOME:  He is on Proventil inhaler p.r.n., Symbicort 160/4.5 two puffs b.i.d., digoxin 0.125 mg daily, Aldactone 25 mg daily, Flonase p.r.n., Lasix 20 mg daily, Cozaar 50 mg half a tablet daily, Toprol-XL 50 mg daily, Prilosec 20 mg b.i.d., oxygen at 3.5 liters, Crestor 20 mg daily, and Spiriva daily. He is taking iron 5 grains daily. PAST MEDICAL AND SURGICAL HISTORY:  1. Cardiac as above. 2.  Hyperlipidemia. 3.  Chronic back pain. 4.  Prostate surgery. 5.  Hypertension. 6.  Renal artery stenosis. 7.  Severe COPD, on oxygen. 8.  Eye surgery. 9.  Paroxysmal atrial fibrillation, not anticoagulated by his desire. 10.  Stable 3.9 x 3.5 infrarenal aortic aneurysm. FAMILY HISTORY:  Mother had cancer. Brother had cancer. Father had cancer. SOCIAL HISTORY:  He is 66years old,  for the second time. Has four children. Retired. Smokes a half a pack a day. Followed at the [facility] by Dr. Hao Mack, who does an excellent job. Does not exercise. Very inactive. REVIEW OF SYSTEMS:  Cardiac as above.   Other systems reviewed including constitutional, eyes, ears, nose and throat, cardiovascular, respiratory, GI, , musculoskeletal, integumentary, neurologic, endocrine, hematologic and allergic/immunologic are negative except for what is described above. No weight loss or weight gain. No change in bowel habits. No blood in stools. No fevers, sweats or chills. PHYSICAL EXAMINATION:  VITAL SIGNS:  His blood pressure was 120/60 with a heart rate of 66 and regular. Respiratory rate 18. O2 sat 92%. Weight 162 pounds. GENERAL:  He is a very pleasant 26-year-old gentleman. Denied pain. He was oriented to person, place and time. Answered questions appropriately. SKIN:  No unusual skin changes. HEENT:  The pupils are equally round and intact. Mucous membranes were dry. NECK:  No JVD. Good carotid pulses. No carotid bruits. No lymphadenopathy or thyromegaly. CARDIOVASCULAR EXAM:  S1 and S2 were normal.  No S3 or S4. Soft systolic blowing type murmur. No diastolic murmur. PMI was normal.  No lift, thrust, or pericardial friction rub. CHEST:  I did examine his breasts. He did have swelling with tenderness consistent with bilateral gynecomastia. He also had a slight swelling under his left arm, which I think is mammary tissue that has also enlarged with spironolactone. LUNGS:  Few wheezes bilaterally. ABDOMEN:  Soft and nontender. Good bowel sounds. EXTREMITIES:  Good femoral pulses. Good pedal pulses. He had no pedal edema. Skin was warm and dry. No calf tenderness. Nail beds pink. Good cap refill. PULSES:  Bilateral symmetrical radial, brachial and carotid pulses. No carotid bruits. Good femoral and pedal pulses. NEUROLOGIC EXAM:  Within normal limits. PSYCHIATRIC EXAM:  Within normal limits. LABORATORY DATA:  From the [facility] from 07/05/2022, BUN 17, calcium was 9.6, creatinine 1.0, glucose 110, sodium 141, potassium 4.7, GFR 60, ferritin 45. Vitamin D 35. TSH 2.13. His free thyroxine was normal at 1.36. Hemoglobin was 12.2 with hematocrit of 37.2, platelet count 377,248. Magnesium 1.8. EKG showed sinus rhythm and was normal.    Bedside echocardiogram showed normal LV function with enlarged right-sided chambers consistent with pulmonary hypertension. IMPRESSION:  1. Paroxysmal atrial fibrillation, not anticoagulated by his choice. 2.  Bilateral gynecomastia secondary to spironolactone noticed in the last three weeks. 3.  Moderate-to-severe pulmonary hypertension with a known PA pressure in the 57 range. 4.  Normal LV function, EF of 55%. 5.  A 3.5 x 3.9 infrarenal aortic aneurysm. 6.  Not on anticoagulation by his desire, even though he has a CHADS score of 3.  7.  Hypertension. 8.  Continued smoking abuse. 9.  Severe COPD, on 3.5 liters per minute. PLAN:  1. We will stop spironolactone. 2.  We will start eplerenone (Inspra) 25 mg daily, which does not cause gynecomastia. 3.  We will see in one year unless he needs to be seen sooner. 4.  We will prescribe Cardizem 30 mg p.r.n. for his atrial fibrillation if his rate is above 150, may take up to four doses. DISCUSSION:  Mr. Emily Haskins continues to have episodes of atrial fibrillation. He does not wish to be anticoagulated. He did develop gynecomastia from his spironolactone. I agree with the ultrasound and mammogram, but I would be shocked if he had any malignancy at this time. I have stopped spironolactone and started Inspra or eplerenone 25 mg once a day. With his severe pulmonary hypertension, I would continue to use aldosterone inhibitor with eplerenone 25 mg daily to prevent pedal edema. I did give him Cardizem 30 mg to use as needed for his atrial fibrillation with RVR. I did call your office, hopefully you got message, concerning the change. If there are any issues that come up, please feel free to call me, my cell phone is [phone]. Thank you very much.      Sincerely,        Shannon Vigil    D: 08/16/2022 8:18:37     T: 08/16/2022 9:37:05     MARIA E/[email or web address]_PETERSON  Job#: [id number]   Doc#: [id number]

## 2022-08-18 NOTE — PROGRESS NOTES
Lyudmila Velazquez M.D. [address] [place], [place] [address] [phone]          August 15, 2022          Cirilo ALYSSA [address]  224 [facility], [address] [place] [address]      RE:   James Phillips  :  1950      Dear Dr. Michael Prophet:    CHIEF COMPLAINT:  Gynecomastia secondary to spironolactone. HISTORY OF PRESENT ILLNESS:  I had the pleasure of seeing Mr. James Phillips in our office on 08/15/2022. He is a pleasant 64-year-old gentleman who has a history of paroxysmal atrial fibrillation. We had had him on amiodarone, but he continued to have episodes of atrial fibrillation. Therefore, I stopped amiodarone and placed him on rate control. He has never wanted to be anticoagulated because of a 3.9 x 3.5 cm infrarenal aortic aneurysm, which has been very stable. He does have chronic COPD and is on home oxygen at 3.5 liters per minute. He had a catheterization on 2015, that showed 30% to 40% disease in the right coronary artery, 30% left main trunk, unremarkable circumflex and LAD, with an EF of 60%, medical therapy recommended. On 2017, he developed palpitations and an event recorder showed atrial fibrillation. He saw a cardiologist at [facility] who recommended anticoagulation because of CHADS score of 3; however, he did not want to start. I saw him for a second opinion regarding anticoagulation. He had more episodes of chest pain and shortness of breath and was hospitalized in 2021. He was placed on antibiotics. He also developed episodes of atrial flutter. We placed him on Eliquis 5 mg b.i.d. but he did not wish to stay on Eliquis. We also discharged him on amiodarone 200 mg daily. Again, he had episodes of atrial fibrillation with amiodarone and I stopped this when I saw him on 2022.     He had significant edema, which had been controlled with Lasix 20 mg daily along with Aldactone 25 mg daily. In the last three weeks, he began noticing hardness and swelling in both breasts. He contacted you and you have ordered an ultrasound and a mammogram.    He has had no unusual chest pain. He has chronic shortness of breath, although it seems to be no change from previously. He denies any chest pain or chest discomfort. He is seeing oncology and hematology for anemia in the future. His weight seems about the same. He has had no syncope or near syncope. He can feel when he goes in atrial fibrillation but denies chest pain. CARDIAC RISK FACTORS:  Known CAD:  Mildly positive. Hypertension:  Positive. Hyperlipidemia:  Positive. Peripheral Vascular Disease:  Positive. Smoking:  Positive. Other Family Members:  Positive. Diabetes:  Negative. MEDICATIONS AT HOME:  He is on Proventil inhaler p.r.n., Symbicort 160/4.5 two puffs b.i.d., digoxin 0.125 mg daily, Aldactone 25 mg daily, Flonase p.r.n., Lasix 20 mg daily, Cozaar 50 mg half a tablet daily, Toprol-XL 50 mg daily, Prilosec 20 mg b.i.d., oxygen at 3.5 liters, Crestor 20 mg daily, and Spiriva daily. He is taking iron 5 grains daily. PAST MEDICAL AND SURGICAL HISTORY:  1. Cardiac as above. 2.  Hyperlipidemia. 3.  Chronic back pain. 4.  Prostate surgery. 5.  Hypertension. 6.  Renal artery stenosis. 7.  Severe COPD, on oxygen. 8.  Eye surgery. 9.  Paroxysmal atrial fibrillation, not anticoagulated by his desire. 10.  Stable 3.9 x 3.5 infrarenal aortic aneurysm. FAMILY HISTORY:  Mother had cancer. Brother had cancer. Father had cancer. SOCIAL HISTORY:  He is 66years old,  for the second time. Has four children. Retired. Smokes a half a pack a day. Followed at the [facility] in [place] by Dr. Braden Pineda, who does an excellent job. Does not exercise. Very inactive. REVIEW OF SYSTEMS:  Cardiac as above.   Other systems reviewed including constitutional, eyes, ears, nose and throat, cardiovascular, Magnesium 1.8. EKG showed sinus rhythm and was normal.    Bedside echocardiogram showed normal LV function with enlarged right-sided chambers consistent with pulmonary hypertension. IMPRESSION:  1. Paroxysmal atrial fibrillation, not anticoagulated by his choice. 2.  Bilateral gynecomastia secondary to spironolactone noticed in the last three weeks. 3.  Moderate-to-severe pulmonary hypertension with a known PA pressure in the 57 range. 4.  Normal LV function, EF of 55%. 5.  A 3.5 x 3.9 infrarenal aortic aneurysm. 6.  Not on anticoagulation by his desire, even though he has a CHADS score of 3.  7.  Hypertension. 8.  Continued smoking abuse. 9.  Severe COPD, on 3.5 liters per minute. PLAN:  1. We will stop spironolactone. 2.  We will start eplerenone (Inspra) 25 mg daily, which does not cause gynecomastia. 3.  We will see in one year unless he needs to be seen sooner. 4.  We will prescribe Cardizem 30 mg p.r.n. for his atrial fibrillation if his rate is above 150, may take up to four doses. DISCUSSION:  Mr. Padilla Bonilla continues to have episodes of atrial fibrillation. He does not wish to be anticoagulated. He did develop gynecomastia from his spironolactone. I agree with the ultrasound and mammogram, but I would be shocked if he had any malignancy at this time. I have stopped spironolactone and started Inspra or eplerenone 25 mg once a day. With his severe pulmonary hypertension, I would continue to use aldosterone inhibitor with eplerenone 25 mg daily to prevent pedal edema. I did give him Cardizem 30 mg to use as needed for his atrial fibrillation with RVR. I did call your office, hopefully you got message, concerning the change. If there are any issues that come up, please feel free to call me, my cell phone is [phone]. Thank you very much.      Sincerely,        Chandu Howard    D: 08/16/2022 8:18:37     T: 08/16/2022 9:37:05     MARIA E/[email or web address]_PETERSON  Job#: [id number]   Doc#: [id number]

## 2022-11-17 ENCOUNTER — HOSPITAL ENCOUNTER (EMERGENCY)
Age: 72
Discharge: HOME | End: 2022-11-17
Attending: EMERGENCY MEDICINE
Payer: OTHER GOVERNMENT

## 2022-11-17 ENCOUNTER — APPOINTMENT (OUTPATIENT)
Dept: GENERAL RADIOLOGY | Age: 72
End: 2022-11-17
Payer: OTHER GOVERNMENT

## 2022-11-17 VITALS
BODY MASS INDEX: 22.69 KG/M2 | RESPIRATION RATE: 20 BRPM | SYSTOLIC BLOOD PRESSURE: 110 MMHG | TEMPERATURE: 98.5 F | DIASTOLIC BLOOD PRESSURE: 63 MMHG | HEIGHT: 70 IN | WEIGHT: 158.5 LBS | HEART RATE: 60 BPM | OXYGEN SATURATION: 99 %

## 2022-11-17 DIAGNOSIS — R53.1 GENERAL WEAKNESS: ICD-10-CM

## 2022-11-17 DIAGNOSIS — I48.91 ATRIAL FIBRILLATION WITH RVR (HCC): ICD-10-CM

## 2022-11-17 DIAGNOSIS — I48.0 PAROXYSMAL ATRIAL FIBRILLATION (HCC): Primary | ICD-10-CM

## 2022-11-17 LAB
ABSOLUTE EOS #: 0.1 K/UL (ref 0–0.4)
ABSOLUTE LYMPH #: 1.2 K/UL (ref 1–4.8)
ABSOLUTE MONO #: 0.7 K/UL (ref 0–1)
ALBUMIN SERPL-MCNC: 4 G/DL (ref 3.5–5.2)
ALP BLD-CCNC: 76 U/L (ref 40–129)
ALT SERPL-CCNC: 12 U/L (ref 5–41)
ANION GAP SERPL CALCULATED.3IONS-SCNC: 6 MMOL/L (ref 9–17)
AST SERPL-CCNC: 15 U/L
BASOPHILS # BLD: 1 % (ref 0–2)
BASOPHILS ABSOLUTE: 0.1 K/UL (ref 0–0.2)
BILIRUB SERPL-MCNC: 0.3 MG/DL (ref 0.3–1.2)
BUN BLDV-MCNC: 13 MG/DL (ref 8–23)
BUN/CREAT BLD: 14 (ref 9–20)
CALCIUM SERPL-MCNC: 9.4 MG/DL (ref 8.6–10.4)
CHLORIDE BLD-SCNC: 101 MMOL/L (ref 98–107)
CO2: 33 MMOL/L (ref 20–31)
CREAT SERPL-MCNC: 0.91 MG/DL (ref 0.7–1.2)
DIFFERENTIAL TYPE: YES
DIGOXIN DATE LAST DOSE: NORMAL
DIGOXIN DOSE AMOUNT: NORMAL
DIGOXIN DOSE TIME: NORMAL
DIGOXIN LEVEL: 0.7 NG/ML (ref 0.5–2)
EOSINOPHILS RELATIVE PERCENT: 2 % (ref 0–5)
GFR SERPL CREATININE-BSD FRML MDRD: >60 ML/MIN/1.73M2
GLUCOSE BLD-MCNC: 127 MG/DL (ref 70–99)
HCT VFR BLD CALC: 38.5 % (ref 41–53)
HEMOGLOBIN: 12.7 G/DL (ref 13.5–17.5)
INR BLD: 1
LYMPHOCYTES # BLD: 13 % (ref 13–44)
MAGNESIUM: 2 MG/DL (ref 1.6–2.6)
MCH RBC QN AUTO: 28.3 PG (ref 26–34)
MCHC RBC AUTO-ENTMCNC: 33 G/DL (ref 31–37)
MCV RBC AUTO: 85.6 FL (ref 80–100)
MONOCYTES # BLD: 7 % (ref 5–9)
PARTIAL THROMBOPLASTIN TIME: 33.5 SEC (ref 23.9–33.8)
PDW BLD-RTO: 14.8 % (ref 12.1–15.2)
PLATELET # BLD: 207 K/UL (ref 140–450)
POTASSIUM SERPL-SCNC: 4.2 MMOL/L (ref 3.7–5.3)
PROTHROMBIN TIME: 13.3 SEC (ref 11.5–14.2)
RBC # BLD: 4.49 M/UL (ref 4.5–5.9)
SEG NEUTROPHILS: 77 % (ref 39–75)
SEGMENTED NEUTROPHILS ABSOLUTE COUNT: 7.1 K/UL (ref 2.1–6.5)
SODIUM BLD-SCNC: 140 MMOL/L (ref 135–144)
TOTAL PROTEIN: 6.7 G/DL (ref 6.4–8.3)
WBC # BLD: 9.1 K/UL (ref 3.5–11)

## 2022-11-17 PROCEDURE — 85730 THROMBOPLASTIN TIME PARTIAL: CPT

## 2022-11-17 PROCEDURE — 85610 PROTHROMBIN TIME: CPT

## 2022-11-17 PROCEDURE — 80053 COMPREHEN METABOLIC PANEL: CPT

## 2022-11-17 PROCEDURE — 71045 X-RAY EXAM CHEST 1 VIEW: CPT

## 2022-11-17 PROCEDURE — 96374 THER/PROPH/DIAG INJ IV PUSH: CPT

## 2022-11-17 PROCEDURE — 2580000003 HC RX 258: Performed by: EMERGENCY MEDICINE

## 2022-11-17 PROCEDURE — 96361 HYDRATE IV INFUSION ADD-ON: CPT

## 2022-11-17 PROCEDURE — 83735 ASSAY OF MAGNESIUM: CPT

## 2022-11-17 PROCEDURE — 2500000003 HC RX 250 WO HCPCS: Performed by: EMERGENCY MEDICINE

## 2022-11-17 PROCEDURE — 80162 ASSAY OF DIGOXIN TOTAL: CPT

## 2022-11-17 PROCEDURE — 93005 ELECTROCARDIOGRAM TRACING: CPT | Performed by: EMERGENCY MEDICINE

## 2022-11-17 PROCEDURE — 99285 EMERGENCY DEPT VISIT HI MDM: CPT

## 2022-11-17 PROCEDURE — 85025 COMPLETE CBC W/AUTO DIFF WBC: CPT

## 2022-11-17 RX ORDER — 0.9 % SODIUM CHLORIDE 0.9 %
500 INTRAVENOUS SOLUTION INTRAVENOUS ONCE
Status: COMPLETED | OUTPATIENT
Start: 2022-11-17 | End: 2022-11-17

## 2022-11-17 RX ORDER — DILTIAZEM HYDROCHLORIDE 5 MG/ML
15 INJECTION INTRAVENOUS ONCE
Status: COMPLETED | OUTPATIENT
Start: 2022-11-17 | End: 2022-11-17

## 2022-11-17 RX ADMIN — SODIUM CHLORIDE 500 ML: 9 INJECTION, SOLUTION INTRAVENOUS at 13:22

## 2022-11-17 RX ADMIN — DILTIAZEM HYDROCHLORIDE 15 MG: 5 INJECTION INTRAVENOUS at 13:14

## 2022-11-17 ASSESSMENT — LIFESTYLE VARIABLES
HOW MANY STANDARD DRINKS CONTAINING ALCOHOL DO YOU HAVE ON A TYPICAL DAY: PATIENT DOES NOT DRINK
HOW OFTEN DO YOU HAVE A DRINK CONTAINING ALCOHOL: NEVER

## 2022-11-17 ASSESSMENT — ENCOUNTER SYMPTOMS
COUGH: 0
ABDOMINAL PAIN: 0
SORE THROAT: 0
EYE PAIN: 0
COLOR CHANGE: 0
TROUBLE SWALLOWING: 0
EYE REDNESS: 0
VOMITING: 0
SHORTNESS OF BREATH: 1
DIARRHEA: 0
BACK PAIN: 0
NAUSEA: 0

## 2022-11-17 ASSESSMENT — PAIN - FUNCTIONAL ASSESSMENT: PAIN_FUNCTIONAL_ASSESSMENT: NONE - DENIES PAIN

## 2022-11-17 NOTE — ED PROVIDER NOTES
[facility] ED  eMERGENCY dEPARTMENT eNCOUnter      Pt Name: Sal Hollingsworth  MRN: [id number]  [place] 1950  Date of evaluation: 11/17/2022  Provider: Dre Burnett MD    CHIEF COMPLAINT       Chief Complaint   Patient presents with    Other     Patient states he is in afib. \"I take my blood pressure at home and my heart rate was elevated and it said I was in afib\"     Patient is a 64-year-old male who presents to the emergency department complaining of feeling his heart racing. Patient does take his blood pressure at home and noted that he was in atrial fibrillation. Patient states he has had this before and takes medicine for it. Patient states that he also had a little bit of chest pain. He denies any fever or chills. He denies worsening shortness of breath but states he is always short of breath with his COPD and is on 3 L of oxygen at home. Nursing Notes were reviewed. REVIEW OF SYSTEMS    (2-9 systems for level 4, 10 or more for level 5)     Review of Systems   Constitutional:  Negative for chills and fever. HENT:  Negative for ear pain, sore throat and trouble swallowing. Eyes:  Negative for pain and redness. Respiratory:  Positive for shortness of breath. Negative for cough. Cardiovascular:  Positive for chest pain and palpitations. Gastrointestinal:  Negative for abdominal pain, diarrhea, nausea and vomiting. Genitourinary:  Negative for dysuria and frequency. Musculoskeletal:  Negative for back pain and neck pain. Skin:  Negative for color change and rash. Neurological:  Negative for dizziness, syncope and headaches. Psychiatric/Behavioral:  Negative for hallucinations and suicidal ideas. Except as noted above the remainder of the review of systems was reviewed and negative.        PAST MEDICAL HISTORY     Past Medical History:   Diagnosis Date    Abdominal aneurysm     x2    Aortic aneurysm [facility])     Atrial fibrillation (HCC)     Chicken pox     Chronic back pain     COPD (chronic obstructive pulmonary disease) (HCC)     Emphysema of lung (HCC)     Emphysema of lung (HCC)     Hyperlipidemia     Hypertension 2015    Osteoarthritis          SURGICAL HISTORY       Past Surgical History:   Procedure Laterality Date    CARDIAC CATHETERIZATION Left 08/05/15    Dr. Coelho Comes 30% disease of the left main trunck, Mild plaque disease in the coronary arteries. Normal LV function, ejection fraction of 60%    EYE SURGERY           ALLERGIES     Codeine, Atorvastatin, and Morphine    FAMILY HISTORY       Family History   Problem Relation Age of Onset    Cancer Mother         Pancreatic cancer    Cancer Father         Lung    Cancer Brother         Brain cancer, pancreatic cancer, colon cancer          SOCIAL HISTORY       Social History     Socioeconomic History    Marital status:      Spouse name: None    Number of children: None    Years of education: None    Highest education level: None   Tobacco Use    Smoking status: Former     Packs/day: 1.00     Years: 39.00     Pack years: 39.00     Types: Cigarettes     Quit date: 2017     Years since quittin.8    Smokeless tobacco: Never    Tobacco comments:     a couple times a week   Substance and Sexual Activity    Alcohol use: No     Alcohol/week: 0.0 standard drinks     Comment: very little    Drug use: No           PHYSICAL EXAM    (up to 7 for level 4, 8 ormore for level 5)     ED Triage Vitals   BP Temp Temp Source Heart Rate Resp SpO2 Height Weight   22 1258 22 1258 22 1258 22 1258 22 1300 22 1300 22 1300 22 1300   112/74 98.5 °F (36.9 °C) Oral (!) 149 18 99 % 5' 10\" (1.778 m) 158 lb 8 oz (71.9 kg)       Physical Exam    Physical    Vital signs and nursing notes were reviewed as well as the social, family, and past medical history. Gen.  appearance: Patient is alert and oriented and in no acute distress    Head: Atraumatic, normocephalic    Neck: Supple, trachea/thyroid normal    EENT: PERRLA, EOMI, conjunctiva normal.    Skin: Warm and dry with no rash    Cardiovascular: Heart tachycardic regular and generally regular, no gallops or rubs, no aortic enlargement or bruits noted. Respiratory: Lungs clear, no wheezing, no rales, normal breath sounds. Gastrointestinal: Abdomen nontender, bowel sounds normal, no rebound/guarding/distention or mass    Musculoskeletal: No tenderness in the extremities, no back or hip pain. Neurological: Patient is alert and oriented ×3, no focal motor or sensory deficits noted, reflexes normal, NIH = 0      DIAGNOSTIC RESULTS     EKG: All EKG's are interpreted by the Emergency Department Physicianwho either signs or Co-signs this chart in the absence of a cardiologist.    Atrial fibs/flutter with a rate of 164 and no acute ischemic changes. RADIOLOGY:         LABS:  Labs Reviewed   CBC WITH AUTO DIFFERENTIAL - Abnormal; Notable for the following components:       Result Value    RBC 4.49 (*)     Hemoglobin 12.7 (*)     Hematocrit 38.5 (*)     Seg Neutrophils 77 (*)     Segs Absolute 7.10 (*)     All other components within normal limits   COMPREHENSIVE METABOLIC PANEL - Abnormal; Notable for the following components:    Glucose 127 (*)     CO2 33 (*)     Anion Gap 6 (*)     All other components within normal limits   MAGNESIUM   PROTIME-INR   APTT   DIGOXIN LEVEL       All other labs were within normal range or not returned as of this dictation.     EMERGENCY DEPARTMENT COURSE and DIFFERENTIAL DIAGNOSIS/MDM:   Vitals:    Vitals:    11/17/22 1258 11/17/22 1300 11/17/22 1304 11/17/22 1344   BP: 112/74 112/71 112/71 110/63   Pulse: (!) 149  (!) 107 60   Resp:  18 18 20   Temp: 98.5 °F (36.9 °C)      TempSrc: Oral      SpO2:  99% 99% 99%   Weight:  158 lb 8 oz (71.9 kg)     Height:  5' 10\" (1.778 m)                   REASSESSMENT      Here in the ED patient received Cardizem bolus and converted into normal sinus rhythm with a rate in the 60s. A repeat EKG showed no acute abnormalities and a normal sinus rhythm. With a rate of 60. Patient's labs were unremarkable. I discussed with Dr. Danni Fuentes who is comfortable with the patient going home. I had a long discussion with the patient and wife regarding taking blood thinners given his paroxysmal A. fib. To this point they have been unwilling to take them but they will reconsider and call Dr. Danni Fuentes for follow-up and starting blood thinners if they decide to take them. Due to the immediate potential for life-threatening deterioration due to atrial fib with rvr , I spent 35 minutes providing critical care. This time is excluding time spent performing procedures. PROCEDURES:  Unless otherwise noted below, none     Procedures    FINAL IMPRESSION      1. Paroxysmal atrial fibrillation (HCC)    2. Atrial fibrillation with RVR ([address]    3.  General weakness          DISPOSITION/PLAN   DISPOSITION Decision To Discharge 11/17/2022 02:22:16 PM      PATIENT REFERRED TO:  Nawaf Perdue MD  [place] [id number] [phone]          DISCHARGE MEDICATIONS:  New Prescriptions    No medications on file          (Please note that portions ofthis note were completed with a voice recognition program.  Efforts were made to edit the dictations but occasionally words are mis-transcribed.)    Tayla Schwartz MD(electronically signed)  Attending Emergency Physician            Tayla Schwartz MD  11/17/22 2918

## 2022-11-18 LAB
EKG ATRIAL RATE: 164 BPM
EKG ATRIAL RATE: 60 BPM
EKG P AXIS: 76 DEGREES
EKG P AXIS: 88 DEGREES
EKG P-R INTERVAL: 122 MS
EKG P-R INTERVAL: 162 MS
EKG Q-T INTERVAL: 228 MS
EKG Q-T INTERVAL: 376 MS
EKG QRS DURATION: 82 MS
EKG QRS DURATION: 94 MS
EKG QTC CALCULATION (BAZETT): 376 MS
EKG QTC CALCULATION (BAZETT): 376 MS
EKG R AXIS: 80 DEGREES
EKG R AXIS: 81 DEGREES
EKG T AXIS: -26 DEGREES
EKG T AXIS: 81 DEGREES
EKG VENTRICULAR RATE: 164 BPM
EKG VENTRICULAR RATE: 60 BPM

## 2022-11-18 PROCEDURE — 93010 ELECTROCARDIOGRAM REPORT: CPT | Performed by: INTERNAL MEDICINE

## 2023-02-21 ENCOUNTER — OFFICE VISIT (OUTPATIENT)
Dept: FAMILY MEDICINE CLINIC | Age: 73
End: 2023-02-21
Payer: MEDICARE

## 2023-02-21 VITALS
DIASTOLIC BLOOD PRESSURE: 78 MMHG | SYSTOLIC BLOOD PRESSURE: 118 MMHG | OXYGEN SATURATION: 96 % | BODY MASS INDEX: 22.85 KG/M2 | HEIGHT: 70 IN | WEIGHT: 159.6 LBS | HEART RATE: 64 BPM | RESPIRATION RATE: 18 BRPM

## 2023-02-21 DIAGNOSIS — I71.40 ANEURYSM OF SUPRARENAL AORTA: ICD-10-CM

## 2023-02-21 DIAGNOSIS — I10 ESSENTIAL HYPERTENSION: ICD-10-CM

## 2023-02-21 DIAGNOSIS — I48.0 PAROXYSMAL ATRIAL FIBRILLATION (HCC): ICD-10-CM

## 2023-02-21 DIAGNOSIS — J44.9 CHRONIC OBSTRUCTIVE PULMONARY DISEASE, UNSPECIFIED COPD TYPE (HCC): Primary | ICD-10-CM

## 2023-02-21 DIAGNOSIS — E78.00 PURE HYPERCHOLESTEROLEMIA: ICD-10-CM

## 2023-02-21 DIAGNOSIS — Z12.11 COLON CANCER SCREENING: ICD-10-CM

## 2023-02-21 PROCEDURE — 3078F DIAST BP <80 MM HG: CPT | Performed by: INTERNAL MEDICINE

## 2023-02-21 PROCEDURE — 3017F COLORECTAL CA SCREEN DOC REV: CPT | Performed by: INTERNAL MEDICINE

## 2023-02-21 PROCEDURE — G8484 FLU IMMUNIZE NO ADMIN: HCPCS | Performed by: INTERNAL MEDICINE

## 2023-02-21 PROCEDURE — 99204 OFFICE O/P NEW MOD 45 MIN: CPT | Performed by: INTERNAL MEDICINE

## 2023-02-21 PROCEDURE — G8427 DOCREV CUR MEDS BY ELIG CLIN: HCPCS | Performed by: INTERNAL MEDICINE

## 2023-02-21 PROCEDURE — G8420 CALC BMI NORM PARAMETERS: HCPCS | Performed by: INTERNAL MEDICINE

## 2023-02-21 PROCEDURE — 1036F TOBACCO NON-USER: CPT | Performed by: INTERNAL MEDICINE

## 2023-02-21 PROCEDURE — 3074F SYST BP LT 130 MM HG: CPT | Performed by: INTERNAL MEDICINE

## 2023-02-21 PROCEDURE — 3023F SPIROM DOC REV: CPT | Performed by: INTERNAL MEDICINE

## 2023-02-21 PROCEDURE — 1123F ACP DISCUSS/DSCN MKR DOCD: CPT | Performed by: INTERNAL MEDICINE

## 2023-02-21 SDOH — ECONOMIC STABILITY: FOOD INSECURITY: WITHIN THE PAST 12 MONTHS, THE FOOD YOU BOUGHT JUST DIDN'T LAST AND YOU DIDN'T HAVE MONEY TO GET MORE.: NEVER TRUE

## 2023-02-21 SDOH — ECONOMIC STABILITY: FOOD INSECURITY: WITHIN THE PAST 12 MONTHS, YOU WORRIED THAT YOUR FOOD WOULD RUN OUT BEFORE YOU GOT MONEY TO BUY MORE.: NEVER TRUE

## 2023-02-21 SDOH — ECONOMIC STABILITY: INCOME INSECURITY: HOW HARD IS IT FOR YOU TO PAY FOR THE VERY BASICS LIKE FOOD, HOUSING, MEDICAL CARE, AND HEATING?: NOT HARD AT ALL

## 2023-02-21 SDOH — ECONOMIC STABILITY: HOUSING INSECURITY
IN THE LAST 12 MONTHS, WAS THERE A TIME WHEN YOU DID NOT HAVE A STEADY PLACE TO SLEEP OR SLEPT IN A SHELTER (INCLUDING NOW)?: NO

## 2023-02-21 ASSESSMENT — COPD QUESTIONNAIRES: COPD: 1

## 2023-02-21 ASSESSMENT — ENCOUNTER SYMPTOMS
BACK PAIN: 1
HOARSE VOICE: 0
SORE THROAT: 0
HEMOPTYSIS: 0
COUGH: 0
SHORTNESS OF BREATH: 1
BLOOD IN STOOL: 0
ALLERGIC/IMMUNOLOGIC NEGATIVE: 1
ABDOMINAL PAIN: 0
SPUTUM PRODUCTION: 1
NAUSEA: 0
WHEEZING: 1
CONSTIPATION: 0

## 2023-02-21 ASSESSMENT — PATIENT HEALTH QUESTIONNAIRE - PHQ9
SUM OF ALL RESPONSES TO PHQ QUESTIONS 1-9: 0
SUM OF ALL RESPONSES TO PHQ QUESTIONS 1-9: 0
SUM OF ALL RESPONSES TO PHQ9 QUESTIONS 1 & 2: 0
1. LITTLE INTEREST OR PLEASURE IN DOING THINGS: 0
SUM OF ALL RESPONSES TO PHQ QUESTIONS 1-9: 0
2. FEELING DOWN, DEPRESSED OR HOPELESS: 0
SUM OF ALL RESPONSES TO PHQ QUESTIONS 1-9: 0

## 2023-02-21 NOTE — PATIENT INSTRUCTIONS
SURVEY:    You may be receiving a survey from [employer] regarding your visit today. Please complete the survey to enable us to provide the highest quality of care to you and your family. If you cannot score us a very good on any question, please call the office to discuss how we could have made your experience a very good one. Thank you.   [id number], MD Rickard Gosselin, MD Letha Labrum, MD Brayden Bui, [address], PM  [place]H [place], [address] [place] [address]

## 2023-02-21 NOTE — PROGRESS NOTES
HPI Notes    Name: Rhonda Hernandez  : 1950         Chief Complaint:     Chief Complaint   Patient presents with    New Patient     Patient goes to [place] where he is prescribed RX, patient can not get through rarely. Patient is looking for a PCP for basic need and ER  situations       History of Present Illness:        Rhonda Hernandez is a new patient who presents to our office to establish care. [facility] [place] [facility] Becca Marie  Patient's EMR records reviewed. Has a history of severe COPD with chronic respiratory failure, on 3-3.5 L of oxygen at rest  Using about 4-4.5 L with activities. Follows up with a pulmonologist in [address] [place] [address]. Still smokes \"2 puffs \"every morning and 2 puffs every evening. Used to smoke 1.5  ppd. Patient has a history of paroxysmal atrial fibrillation, follows up with Dr. Jennifer Hedrick, he is not anticoagulated by his choice due to tendency to bleeding  Has a history of hypertension, hyperlipidemia, AAA, history of anemia, severe pulmonary hypertension    COPD  He complains of shortness of breath, sputum production and wheezing. There is no cough, hemoptysis or hoarse voice. This is a chronic problem. The current episode started more than 1 year ago. The problem has been unchanged. Pertinent negatives include no appetite change, chest pain, ear congestion, ear pain, headaches, orthopnea or sore throat. His symptoms are aggravated by strenuous activity, URI and change in weather. His symptoms are alleviated by steroid inhaler and beta-agonist. He reports significant improvement on treatment. Risk factors for lung disease include smoking/tobacco exposure. His past medical history is significant for COPD. Hypertension  This is a chronic problem. The current episode started more than 1 year ago. The problem is unchanged. The problem is controlled. Associated symptoms include peripheral edema and shortness of breath.  Pertinent negatives include no chest pain, headaches or palpitations. Past treatments include angiotensin blockers, beta blockers and diuretics. The current treatment provides significant improvement. Hypertensive end-organ damage includes CAD/MI. There is no history of CVA or heart failure. Hyperlipidemia  This is a chronic problem. The current episode started more than 1 year ago. The problem is controlled. Factors aggravating his hyperlipidemia include smoking. Associated symptoms include shortness of breath. Pertinent negatives include no chest pain. Current antihyperlipidemic treatment includes statins. The current treatment provides significant improvement of lipids. There are no compliance problems. Past Medical History:     Past Medical History:   Diagnosis Date    Abdominal aneurysm     x2    Aortic aneurysm (HCC)     Atrial fibrillation (HCC)     Chicken pox     Chronic back pain     COPD (chronic obstructive pulmonary disease) (HCC)     Emphysema of lung (HCC)     Emphysema of lung ([facility] [address]     Hyperlipidemia     Hypertension 2015    Osteoarthritis       Reviewed all health maintenance requirements and orderedappropriate tests  Health Maintenance Due   Topic Date Due    Depression Screen  Never done    Hepatitis C screen  Never done    Low dose CT lung screening  Never done    Colorectal Cancer Screen  02/28/2017    Lipids  03/12/2022    COVID-19 Vaccine (3 - Booster for Pfizer series) 04/11/2022    Flu vaccine (1) 08/01/2022    Annual Wellness Visit (AWV)  Never done       Past Surgical History:     Past Surgical History:   Procedure Laterality Date    CARDIAC CATHETERIZATION Left 08/05/15    Dr. Aida Castaneda 30% disease of the left main trunck, Mild plaque disease in the coronary arteries. Normal LV function, ejection fraction of 60%    EYE SURGERY          Medications:       Prior to Admission medications    Medication Sig Start Date End Date Taking?  Authorizing Provider   dilTIAZem (CARDIZEM) 30 MG tablet Take 30 mg by mouth Okay to take every 1/2 hour for up to 4 doses for AFIB if heart rate is over 150   Yes Historical Provider, MD   eplerenone (INSPRA) 25 MG tablet Take 1 tablet by mouth in the morning. . 8/15/22  Yes Nolan Saxena MD   digoxin (LANOXIN) 125 MCG tablet Take 1 tablet by mouth daily 2/21/22  Yes Nolan Saxena MD   metoprolol succinate (TOPROL XL) 50 MG extended release tablet Take 50 mg by mouth daily   Yes Historical Provider, MD   losartan (COZAAR) 50 MG tablet Take 1 tablet by mouth daily  Patient taking differently: Take 50 mg by mouth daily Taking 1/2 tablet  (25mg) daily 1/7/22  Yes Peter Holder MD   furosemide (LASIX) 20 MG tablet Take 20 mg by mouth daily 8/16/21  Yes Historical Provider, MD   omeprazole (PRILOSEC) 20 MG delayed release capsule Take 20 mg by mouth 2 times daily   Yes Historical Provider, MD   albuterol (PROVENTIL) (2.5 MG/3ML) 0.083% nebulizer solution Take 2.5 mg by nebulization every 6 hours as needed for Wheezing   Yes Historical Provider, MD   fluticasone (FLONASE) 50 MCG/ACT nasal spray 1 spray by Nasal route daily 12/12/17  Yes Mian Garza DO   rosuvastatin (CRESTOR) 20 MG tablet Take 20 mg by mouth daily   Yes Historical Provider, MD   OXYGEN Inhale 3.5 L into the lungs    Yes Historical Provider, MD   budesonide-formoterol (SYMBICORT) 160-4.5 MCG/ACT AERO Inhale 2 puffs into the lungs 2 times daily.   Yes Historical Provider, MD   albuterol (PROVENTIL;VENTOLIN) 90 MCG/ACT inhaler Inhale 2 puffs into the lungs every 6 hours as needed.   Yes Historical Provider, MD   tiotropium (SPIRIVA) 18 MCG inhalation capsule Inhale 18 mcg into the lungs daily.   Yes Historical Provider, MD        Allergies:       Codeine, Atorvastatin, and Morphine    Social History:     Tobacco: reports that he quit smoking about 6 years ago. His smoking use included cigarettes. He has a 39.00 pack-year smoking history. He has never used smokeless tobacco.  Alcohol:      reports no history of  alcohol use. Drug Use:  reports no history of drug use. Family History:     Family History   Problem Relation Age of Onset    Cancer Mother         Pancreatic cancer    Cancer Father         Lung    Cancer Brother         Brain cancer, pancreatic cancer, colon cancer       Review of Systems:         Review of Systems   Constitutional:  Negative for activity change, appetite change and unexpected weight change. HENT:  Negative for congestion, ear discharge, ear pain, hoarse voice and sore throat. Eyes:  Negative for visual disturbance. Respiratory:  Positive for sputum production, shortness of breath and wheezing. Negative for cough and hemoptysis. Cardiovascular:  Positive for leg swelling. Negative for chest pain and palpitations. Gastrointestinal:  Negative for abdominal pain, blood in stool, constipation and nausea. Endocrine: Negative for cold intolerance, heat intolerance, polydipsia and polyuria. Genitourinary:  Negative for difficulty urinating and dysuria. Musculoskeletal:  Positive for arthralgias and back pain. Negative for joint swelling. Skin:  Negative for rash. Allergic/Immunologic: Negative. Neurological:  Negative for dizziness, weakness and headaches. Psychiatric/Behavioral:  Negative for behavioral problems and dysphoric mood. The patient is not nervous/anxious. Physical Exam:     Vitals:  /78 (Site: Right Upper Arm, Position: Sitting, Cuff Size: Medium Adult)   Pulse 64   Resp 18   Ht 5' 10\" (1.778 m)   Wt 159 lb 9.6 oz (72.4 kg)   SpO2 96%   BMI 22.90 kg/m²       Physical Exam  Vitals reviewed. Constitutional:       General: He is not in acute distress. Appearance: Normal appearance. He is well-developed. HENT:      Head: Normocephalic and atraumatic. Right Ear: Tympanic membrane, ear canal and external ear normal.      Left Ear: Tympanic membrane, ear canal and external ear normal.   Neck:      Thyroid: No thyromegaly. Cardiovascular:      Rate and Rhythm: Normal rate and regular rhythm. Heart sounds: Normal heart sounds. No murmur heard. Pulmonary:      Effort: Pulmonary effort is normal.      Breath sounds: Normal breath sounds. No wheezing or rales. Abdominal:      General: Bowel sounds are normal. There is no distension. Palpations: Abdomen is soft. There is no mass. Tenderness: There is no abdominal tenderness. Musculoskeletal:         General: Normal range of motion. Right lower leg: No edema. Left lower leg: No edema. Lymphadenopathy:      Cervical: No cervical adenopathy. Skin:     General: Skin is warm and dry. Coloration: Skin is not jaundiced or pale. Findings: No rash. Neurological:      General: No focal deficit present. Mental Status: He is alert and oriented to person, place, and time. Mental status is at baseline. Psychiatric:         Mood and Affect: Mood normal.         Behavior: Behavior normal.         Thought Content:  Thought content normal.         Judgment: Judgment normal.             Data:     Lab Results   Component Value Date/Time     11/17/2022 01:07 PM    K 4.2 11/17/2022 01:07 PM     11/17/2022 01:07 PM    CO2 33 11/17/2022 01:07 PM    BUN 13 11/17/2022 01:07 PM    CREATININE 0.91 11/17/2022 01:07 PM    GLUCOSE 127 11/17/2022 01:07 PM    PROT 6.7 11/17/2022 01:07 PM    LABALBU 4.0 11/17/2022 01:07 PM    BILITOT 0.3 11/17/2022 01:07 PM    ALKPHOS 76 11/17/2022 01:07 PM    AST 15 11/17/2022 01:07 PM    ALT 12 11/17/2022 01:07 PM     Lab Results   Component Value Date/Time    WBC 9.1 11/17/2022 01:07 PM    RBC 4.49 11/17/2022 01:07 PM    HGB 12.7 11/17/2022 01:07 PM    HCT 38.5 11/17/2022 01:07 PM    MCV 85.6 11/17/2022 01:07 PM    MCH 28.3 11/17/2022 01:07 PM    MCHC 33.0 11/17/2022 01:07 PM    RDW 14.8 11/17/2022 01:07 PM     11/17/2022 01:07 PM    MPV NOT REPORTED 01/03/2022 04:00 AM     Lab Results   Component Value Date/Time TSH 1.14 03/12/2021 01:25 PM     Lab Results   Component Value Date/Time    CHOL 150 03/12/2021 01:25 PM    HDL 70 03/12/2021 01:25 PM          Assessment & Plan        Diagnosis Orders   1. Chronic obstructive pulmonary disease, unspecified COPD type ([facility] [address]   Symptoms stable, continue on Spiriva, Symbicort, Albuterol inhaler and nebs       2. Essential hypertension   Blood pressure is well controlled, continue on Losartan ,Cardizem, Toprol       3. Pure hypercholesterolemia   Continue on Crestor       4. Paroxysmal atrial fibrillation (HCC)   Follows up with Dr. Francy Amos       5. Aneurysm of suprarenal aorta        6. Colon cancer screening   Ordered Cologuard  Fecal DNA Colorectal cancer screening (Cologuard)                      Completed Refills   Requested Prescriptions      No prescriptions requested or ordered in this encounter     Return in about 6 months (around 8/21/2023) for HTN, copd. No orders of the defined types were placed in this encounter. Orders Placed This Encounter   Procedures    Fecal DNA Colorectal cancer screening (Cologuard)           Patient Instructions     SURVEY:    You may be receiving a survey from [employer] regarding your visit today. Please complete the survey to enable us to provide the highest quality of care to you and your family. If you cannot score us a very good on any question, please call the office to discuss how we could have made your experience a very good one. Thank you.   MD Cindy Stein MD [place], [place]  [id number] [facility], [place]  [facility], [place]  Catherine Paiz, [facility], [place], [place]     Electronically signed by Robinette Claude, MD on 2/21/2023 at 8:18 PM           Completed Refills      Requested Prescriptions      No prescriptions requested or ordered in this encounter

## 2023-07-11 ENCOUNTER — TELEPHONE (OUTPATIENT)
Dept: CARDIOLOGY CLINIC | Age: 73
End: 2023-07-11

## 2023-07-11 ENCOUNTER — HOSPITAL ENCOUNTER (EMERGENCY)
Age: 73
Discharge: HOME | End: 2023-07-11
Attending: EMERGENCY MEDICINE
Payer: OTHER GOVERNMENT

## 2023-07-11 ENCOUNTER — APPOINTMENT (OUTPATIENT)
Dept: GENERAL RADIOLOGY | Age: 73
End: 2023-07-11
Payer: OTHER GOVERNMENT

## 2023-07-11 VITALS
RESPIRATION RATE: 17 BRPM | HEART RATE: 57 BPM | DIASTOLIC BLOOD PRESSURE: 65 MMHG | WEIGHT: 157.1 LBS | OXYGEN SATURATION: 98 % | BODY MASS INDEX: 22.54 KG/M2 | SYSTOLIC BLOOD PRESSURE: 126 MMHG | TEMPERATURE: 98 F

## 2023-07-11 DIAGNOSIS — R06.02 SHORTNESS OF BREATH: ICD-10-CM

## 2023-07-11 DIAGNOSIS — J43.9 PULMONARY EMPHYSEMA, UNSPECIFIED EMPHYSEMA TYPE (HCC): ICD-10-CM

## 2023-07-11 DIAGNOSIS — I48.0 INTERMITTENT ATRIAL FIBRILLATION (HCC): Primary | ICD-10-CM

## 2023-07-11 LAB
ALBUMIN SERPL-MCNC: 3.8 G/DL (ref 3.5–5.2)
ALP SERPL-CCNC: 77 U/L (ref 40–129)
ALT SERPL-CCNC: 18 U/L (ref 5–41)
ANION GAP SERPL CALCULATED.3IONS-SCNC: 8 MMOL/L (ref 9–17)
AST SERPL-CCNC: 13 U/L
BASOPHILS # BLD: 0 K/UL (ref 0–0.2)
BASOPHILS NFR BLD: 1 % (ref 0–2)
BILIRUB SERPL-MCNC: 0.2 MG/DL (ref 0.3–1.2)
BUN SERPL-MCNC: 21 MG/DL (ref 8–23)
BUN/CREAT SERPL: 19 (ref 9–20)
CALCIUM SERPL-MCNC: 8.9 MG/DL (ref 8.6–10.4)
CHLORIDE SERPL-SCNC: 96 MMOL/L (ref 98–107)
CO2 SERPL-SCNC: 27 MMOL/L (ref 20–31)
CREAT SERPL-MCNC: 1.1 MG/DL (ref 0.7–1.2)
DIFFERENTIAL TYPE: YES
EKG ATRIAL RATE: 76 BPM
EKG P AXIS: 88 DEGREES
EKG P-R INTERVAL: 150 MS
EKG Q-T INTERVAL: 358 MS
EKG QRS DURATION: 82 MS
EKG QTC CALCULATION (BAZETT): 402 MS
EKG R AXIS: 83 DEGREES
EKG T AXIS: 74 DEGREES
EKG VENTRICULAR RATE: 76 BPM
EOSINOPHIL # BLD: 0.1 K/UL (ref 0–0.4)
EOSINOPHILS RELATIVE PERCENT: 1 % (ref 0–5)
ERYTHROCYTE [DISTWIDTH] IN BLOOD BY AUTOMATED COUNT: 14.9 % (ref 12.1–15.2)
GFR SERPL CREATININE-BSD FRML MDRD: >60 ML/MIN/1.73M2
GLUCOSE SERPL-MCNC: 129 MG/DL (ref 70–99)
HCT VFR BLD AUTO: 38.7 % (ref 41–53)
HGB BLD-MCNC: 12.7 G/DL (ref 13.5–17.5)
LYMPHOCYTES # BLD: 15 % (ref 13–44)
LYMPHOCYTES NFR BLD: 1.5 K/UL (ref 1–4.8)
MCH RBC QN AUTO: 28.3 PG (ref 26–34)
MCHC RBC AUTO-ENTMCNC: 32.7 G/DL (ref 31–37)
MCV RBC AUTO: 86.7 FL (ref 80–100)
MONOCYTES NFR BLD: 0.6 K/UL (ref 0–1)
MONOCYTES NFR BLD: 7 % (ref 5–9)
NEUTROPHILS NFR BLD: 76 % (ref 39–75)
NEUTS SEG NFR BLD: 7.4 K/UL (ref 2.1–6.5)
PLATELET # BLD AUTO: 192 K/UL (ref 140–450)
POTASSIUM SERPL-SCNC: 3.6 MMOL/L (ref 3.7–5.3)
PROT SERPL-MCNC: 6.6 G/DL (ref 6.4–8.3)
RBC # BLD AUTO: 4.47 M/UL (ref 4.5–5.9)
SODIUM SERPL-SCNC: 131 MMOL/L (ref 135–144)
TROPONIN I SERPL HS-MCNC: 11 NG/L (ref 0–22)
WBC OTHER # BLD: 9.6 K/UL (ref 3.5–11)

## 2023-07-11 PROCEDURE — 99285 EMERGENCY DEPT VISIT HI MDM: CPT

## 2023-07-11 PROCEDURE — 93010 ELECTROCARDIOGRAM REPORT: CPT | Performed by: INTERNAL MEDICINE

## 2023-07-11 PROCEDURE — 84484 ASSAY OF TROPONIN QUANT: CPT

## 2023-07-11 PROCEDURE — 93005 ELECTROCARDIOGRAM TRACING: CPT | Performed by: EMERGENCY MEDICINE

## 2023-07-11 PROCEDURE — 85027 COMPLETE CBC AUTOMATED: CPT

## 2023-07-11 PROCEDURE — 80053 COMPREHEN METABOLIC PANEL: CPT

## 2023-07-11 PROCEDURE — 71045 X-RAY EXAM CHEST 1 VIEW: CPT

## 2023-07-11 RX ORDER — FUROSEMIDE 20 MG/1
10 TABLET ORAL DAILY
Qty: 60 TABLET | Refills: 0
Start: 2023-07-11

## 2023-07-11 RX ORDER — ASPIRIN 81 MG/1
81 TABLET ORAL DAILY
COMMUNITY

## 2023-07-11 RX ORDER — EPLERENONE 25 MG/1
12.5 TABLET, FILM COATED ORAL DAILY
Qty: 30 TABLET | Refills: 11
Start: 2023-07-11

## 2023-07-11 ASSESSMENT — LIFESTYLE VARIABLES: HOW OFTEN DO YOU HAVE A DRINK CONTAINING ALCOHOL: NEVER

## 2023-07-11 ASSESSMENT — PAIN - FUNCTIONAL ASSESSMENT: PAIN_FUNCTIONAL_ASSESSMENT: NONE - DENIES PAIN

## 2023-07-11 NOTE — ED PROVIDER NOTES
[address] [facility],[address] COMPLAINT    Chief Complaint   Patient presents with    Irregular Heart Beat     4 days of a fib. JAVY Finney is a 74 y.o. male who presentsto ED with irregular heart rate. Patient woke up with rapid heart rate of 160 on arrival to ED patient's heart rate is 69 sinus rhythm  Denies chest pain. Patient has chronic shortness of breath. Patient states he has been having goals of atrial fibrillation for the past 4 days. Patient has history of emphysema. Continues to smoke. PAST MEDICAL HISTORY    Past Medical History:   Diagnosis Date    Abdominal aneurysm ([address])     x2    Aortic aneurysm (HCC)     Atrial fibrillation (HCC)     Chicken pox     Chronic back pain     COPD (chronic obstructive pulmonary disease) (HCC)     Emphysema of lung (HCC)     Emphysema of lung (HCC)     Hyperlipidemia     Hypertension 2015    Osteoarthritis        SURGICAL HISTORY    Past Surgical History:   Procedure Laterality Date    CARDIAC CATHETERIZATION Left 08/05/15    Dr. Juli Valles 30% disease of the left main trunck, Mild plaque disease in the coronary arteries.   Normal LV function, ejection fraction of 60%    EYE SURGERY         CURRENT MEDICATIONS    Current Outpatient Rx   Medication Sig Dispense Refill    Budeson-Glycopyrrol-Formoterol (BREZTRI AEROSPHERE IN) Inhale into the lungs      aspirin 81 MG EC tablet Take 1 tablet by mouth daily      furosemide (LASIX) 20 MG tablet Take 0.5 tablets by mouth daily 60 tablet 0    eplerenone (INSPRA) 25 MG tablet Take 0.5 tablets by mouth daily 30 tablet 11    dilTIAZem (CARDIZEM) 30 MG tablet Take 30 mg by mouth Okay to take every 1/2 hour for up to 4 doses for AFIB if heart rate is over 150 (Patient not taking: Reported on 7/11/2023)      digoxin (LANOXIN) 125 MCG tablet Take 1 tablet by mouth daily 30 tablet 11    metoprolol succinate (TOPROL XL) 50 MG extended release tablet Take 1 tablet by mouth daily      losartan

## 2023-07-11 NOTE — TELEPHONE ENCOUNTER
Robson Roy called to state that Marni Jacobson was in afib for 4 days and was in the ED at [facility]. She stated that some of his medications were changed. She stated that she was also told that he had an enlarged heart. She is asking if Dr. Gretchen Hampton would please review his ED visit and his med changes and also she is asking if Marni Jacobson needs to be seen sooner than Aug 14th. She stated that he is in rhythm now.     Please advise

## 2023-07-14 ENCOUNTER — TELEPHONE (OUTPATIENT)
Dept: CARDIOLOGY CLINIC | Age: 73
End: 2023-07-14

## 2023-07-17 ENCOUNTER — TELEPHONE (OUTPATIENT)
Dept: CARDIOLOGY CLINIC | Age: 73
End: 2023-07-17

## 2023-08-12 ENCOUNTER — HOSPITAL ENCOUNTER (OUTPATIENT)
Age: 73
Discharge: HOME | End: 2023-08-12
Payer: MEDICARE

## 2023-08-12 DIAGNOSIS — E78.00 PURE HYPERCHOLESTEROLEMIA: ICD-10-CM

## 2023-08-12 DIAGNOSIS — I48.0 PAROXYSMAL ATRIAL FIBRILLATION (HCC): ICD-10-CM

## 2023-08-12 DIAGNOSIS — I10 ESSENTIAL HYPERTENSION: ICD-10-CM

## 2023-08-12 DIAGNOSIS — I71.40: ICD-10-CM

## 2023-08-12 DIAGNOSIS — R07.9 CHEST PAIN, UNSPECIFIED TYPE: ICD-10-CM

## 2023-08-12 LAB
ALBUMIN SERPL-MCNC: 4.2 G/DL (ref 3.5–5.2)
ALP SERPL-CCNC: 78 U/L (ref 40–129)
ALT SERPL-CCNC: 14 U/L (ref 5–41)
ANION GAP SERPL CALCULATED.3IONS-SCNC: 9 MMOL/L (ref 9–17)
AST SERPL-CCNC: 17 U/L
BASOPHILS # BLD: ABNORMAL K/UL (ref 0–0.2)
BASOPHILS NFR BLD: ABNORMAL % (ref 0–2)
BILIRUB SERPL-MCNC: 0.5 MG/DL (ref 0.3–1.2)
BUN SERPL-MCNC: 20 MG/DL (ref 8–23)
BUN/CREAT SERPL: 20 (ref 9–20)
CALCIUM SERPL-MCNC: 9.9 MG/DL (ref 8.6–10.4)
CHLORIDE SERPL-SCNC: 97 MMOL/L (ref 98–107)
CHOLEST SERPL-MCNC: 133 MG/DL
CHOLESTEROL/HDL RATIO: 2.1
CO2 SERPL-SCNC: 33 MMOL/L (ref 20–31)
CREAT SERPL-MCNC: 1 MG/DL (ref 0.7–1.2)
EOSINOPHIL # BLD: 0.07 K/UL (ref 0–0.4)
EOSINOPHILS RELATIVE PERCENT: 1 % (ref 0–5)
ERYTHROCYTE [DISTWIDTH] IN BLOOD BY AUTOMATED COUNT: 13 % (ref 12.1–15.2)
GFR SERPL CREATININE-BSD FRML MDRD: >60 ML/MIN/1.73M2
GLUCOSE SERPL-MCNC: 108 MG/DL (ref 70–99)
HCT VFR BLD AUTO: 38.8 % (ref 41–53)
HDLC SERPL-MCNC: 63 MG/DL
HGB BLD-MCNC: 12.4 G/DL (ref 13.5–17.5)
LDLC SERPL CALC-MCNC: 58 MG/DL (ref 0–130)
LYMPHOCYTES NFR BLD: 1.39 K/UL (ref 1–4.8)
LYMPHOCYTES RELATIVE PERCENT: 19 % (ref 13–44)
MAGNESIUM SERPL-MCNC: 2 MG/DL (ref 1.6–2.6)
MCH RBC QN AUTO: 27.5 PG (ref 26–34)
MCHC RBC AUTO-ENTMCNC: 32 G/DL (ref 31–37)
MCV RBC AUTO: 86 FL (ref 80–100)
MONOCYTES NFR BLD: 0.73 K/UL (ref 0–1)
MONOCYTES NFR BLD: 10 % (ref 5–9)
MORPHOLOGY: ABNORMAL
NEUTROPHILS NFR BLD: 70 % (ref 39–75)
NEUTS SEG NFR BLD: 5.11 K/UL (ref 2.1–6.5)
PATIENT FASTING?: YES
PLATELET # BLD AUTO: 223 K/UL (ref 140–450)
POTASSIUM SERPL-SCNC: 5.1 MMOL/L (ref 3.7–5.3)
PROT SERPL-MCNC: 7.5 G/DL (ref 6.4–8.3)
RBC # BLD AUTO: 4.51 M/UL (ref 4.5–5.9)
SODIUM SERPL-SCNC: 139 MMOL/L (ref 135–144)
TRIGL SERPL-MCNC: 59 MG/DL
TSH SERPL DL<=0.05 MIU/L-ACNC: 1.4 UIU/ML (ref 0.3–5)
WBC OTHER # BLD: 7.3 K/UL (ref 3.5–11)

## 2023-08-12 PROCEDURE — 83735 ASSAY OF MAGNESIUM: CPT

## 2023-08-12 PROCEDURE — 80061 LIPID PANEL: CPT

## 2023-08-12 PROCEDURE — 85025 COMPLETE CBC W/AUTO DIFF WBC: CPT

## 2023-08-12 PROCEDURE — 36415 COLL VENOUS BLD VENIPUNCTURE: CPT

## 2023-08-12 PROCEDURE — 80053 COMPREHEN METABOLIC PANEL: CPT

## 2023-08-12 PROCEDURE — 84443 ASSAY THYROID STIM HORMONE: CPT

## 2023-08-14 ENCOUNTER — OFFICE VISIT (OUTPATIENT)
Dept: CARDIOLOGY CLINIC | Age: 73
End: 2023-08-14
Payer: MEDICARE

## 2023-08-14 ENCOUNTER — HOSPITAL ENCOUNTER (OUTPATIENT)
Age: 73
Discharge: HOME | End: 2023-08-16
Attending: INTERNAL MEDICINE
Payer: MEDICARE

## 2023-08-14 VITALS
DIASTOLIC BLOOD PRESSURE: 60 MMHG | WEIGHT: 150 LBS | OXYGEN SATURATION: 92 % | HEART RATE: 59 BPM | BODY MASS INDEX: 21.52 KG/M2 | SYSTOLIC BLOOD PRESSURE: 120 MMHG

## 2023-08-14 DIAGNOSIS — I48.0 PAROXYSMAL ATRIAL FIBRILLATION (HCC): Primary | ICD-10-CM

## 2023-08-14 DIAGNOSIS — I48.0 PAROXYSMAL ATRIAL FIBRILLATION (HCC): ICD-10-CM

## 2023-08-14 PROCEDURE — G8427 DOCREV CUR MEDS BY ELIG CLIN: HCPCS | Performed by: INTERNAL MEDICINE

## 2023-08-14 PROCEDURE — 3017F COLORECTAL CA SCREEN DOC REV: CPT | Performed by: INTERNAL MEDICINE

## 2023-08-14 PROCEDURE — 3074F SYST BP LT 130 MM HG: CPT | Performed by: INTERNAL MEDICINE

## 2023-08-14 PROCEDURE — 4004F PT TOBACCO SCREEN RCVD TLK: CPT | Performed by: INTERNAL MEDICINE

## 2023-08-14 PROCEDURE — 3078F DIAST BP <80 MM HG: CPT | Performed by: INTERNAL MEDICINE

## 2023-08-14 PROCEDURE — 1123F ACP DISCUSS/DSCN MKR DOCD: CPT | Performed by: INTERNAL MEDICINE

## 2023-08-14 PROCEDURE — 99214 OFFICE O/P EST MOD 30 MIN: CPT | Performed by: INTERNAL MEDICINE

## 2023-08-14 PROCEDURE — G8420 CALC BMI NORM PARAMETERS: HCPCS | Performed by: INTERNAL MEDICINE

## 2023-08-14 PROCEDURE — 93270 REMOTE 30 DAY ECG REV/REPORT: CPT

## 2023-08-14 RX ORDER — DILTIAZEM HYDROCHLORIDE 120 MG/1
120 CAPSULE, COATED, EXTENDED RELEASE ORAL DAILY
Qty: 30 CAPSULE | Refills: 11 | Status: SHIPPED | OUTPATIENT
Start: 2023-08-14

## 2023-08-14 RX ORDER — LOSARTAN POTASSIUM 25 MG/1
25 TABLET ORAL DAILY
COMMUNITY

## 2023-08-14 RX ORDER — FUROSEMIDE 20 MG/1
20 TABLET ORAL DAILY
COMMUNITY

## 2023-08-14 RX ORDER — EPLERENONE 25 MG/1
25 TABLET, FILM COATED ORAL DAILY
COMMUNITY

## 2023-08-14 RX ORDER — DILTIAZEM HYDROCHLORIDE 120 MG/1
120 CAPSULE, COATED, EXTENDED RELEASE ORAL DAILY
COMMUNITY
End: 2023-08-14 | Stop reason: SDUPTHER

## 2023-08-14 NOTE — PATIENT INSTRUCTIONS
Call back with medications you are taking   (Read directly off medication bottles    Will decrease Metoprolol 50 mg to HALF (25 mg) tablet daily     Will ADD Cardizem  mg on e daily     Monitor BP/HR daily - different times of the day   Report readings back to us via phone in 1 weeks       Will set up for 30 day event monitor     Follow up in 6 weeks

## 2023-08-14 NOTE — PROGRESS NOTES
Ov Dr. Ryan Long for one year follow up   Did not bring list/bottles of med  \"There are in there\"   Couple hospitalizations   Here at [facility] for A Fib   July was the latest  No chest pain   Wife states he was in A Flutter   This am   Dull chest pain when in a fib   Had bad episode last night per pt   140 last night   No dizziness or lightheadedness   Bedside echo done  On 2 LPM will \"sometimes\" increase  To 3-3.5 LPM when active       Call back with medications you are taking   (Read directly off medication bottles    Will decrease Metoprolol 50 mg to HALF (25 mg) tablet daily     Will ADD Cardizem  mg on e daily     Monitor BP/HR daily - different times of the day   Report readings back to us via phone in 1 weeks       Will set up for 30 day event monitor     Follow up in 6 weeks     Wife called back with meds  List is updated

## 2023-08-15 DIAGNOSIS — I48.91 ATRIAL FIBRILLATION, UNSPECIFIED TYPE (HCC): Primary | ICD-10-CM

## 2023-08-15 NOTE — PROGRESS NOTES
Cami Guzmán M.D. [facility]   [address] [facility]  [phone]          2023          Alcides Tejada MD  [facility] [address]  [place]        RE:   Charis Montalvo  :        Dear Dr. Hilda Han:    CHIEF COMPLAINT:  1. Paroxysmal atrial fibrillation. 2.  Not anticoagulated by his desire. 3.  ER visit for atrial fibrillation with RVR with heart rate in the 140 range before coming to the ER on 2023. HISTORY OF PRESENT ILLNESS:  I had the pleasure of seeing Charis Montalvo and his wife, Beata England, in our office on 2023. He is a pleasant 66-year-old gentleman, who has a long history of paroxysmal atrial fibrillation. We had him on amiodarone but he continued to have episodes of atrial fibrillation. Therefore, I stopped amiodarone and placed him on rate control. He has never wanted to be anticoagulated because of a 3.9 x 3.5 cm infrarenal aortic aneurysm. He does have chronic COPD and is now on home oxygen at 2 liters per minute. If he does any activity, he will increase it to 3.5 liters per minute. He had a catheterization on 2015, that showed 30% to 40% disease in the right coronary artery, with 30% left main trunk, unremarkable circulation and LAD, with an EF of 60%, medical therapy recommended. On 2017, he developed palpitations and an event recorder showed atrial fibrillation. He saw a cardiologist at [facility] who recommended anticoagulation because of the CHADS score of 3, which he did not wish to do. He saw me for a second opinion. He had more episodes of chest pain in 2021. He was placed on antibiotics and developed atrial flutter. We placed him on Eliquis 5 mg b.i.d. but he did not wish to do so.   We had discharged him on amiodarone 200 mg daily; however, he continued to have episodes of atrial fibrillation; therefore, I stopped his

## 2023-08-16 ENCOUNTER — HOSPITAL ENCOUNTER (OUTPATIENT)
Age: 73
Discharge: HOME | End: 2023-08-18
Attending: INTERNAL MEDICINE
Payer: MEDICARE

## 2023-08-16 DIAGNOSIS — I48.91 ATRIAL FIBRILLATION, UNSPECIFIED TYPE (HCC): ICD-10-CM

## 2023-08-16 PROCEDURE — 93246 EXT ECG>7D<15D RECORDING: CPT

## 2023-09-05 RX ORDER — DILTIAZEM HYDROCHLORIDE 120 MG/1
120 CAPSULE, COATED, EXTENDED RELEASE ORAL DAILY
Qty: 30 CAPSULE | Refills: 11 | Status: SHIPPED | OUTPATIENT
Start: 2023-09-05

## 2023-09-12 ENCOUNTER — TELEPHONE (OUTPATIENT)
Dept: CARDIOLOGY CLINIC | Age: 73
End: 2023-09-12

## 2023-09-12 NOTE — TELEPHONE ENCOUNTER
Pt's wife is asking for a return call for results of holter monitor. Pt was in afib at 11pm last night again.

## 2023-09-15 ENCOUNTER — APPOINTMENT (OUTPATIENT)
Dept: GENERAL RADIOLOGY | Age: 73
End: 2023-09-15
Payer: OTHER GOVERNMENT

## 2023-09-15 ENCOUNTER — APPOINTMENT (OUTPATIENT)
Dept: CT IMAGING | Age: 73
End: 2023-09-15
Payer: OTHER GOVERNMENT

## 2023-09-15 ENCOUNTER — HOSPITAL ENCOUNTER (EMERGENCY)
Age: 73
Discharge: HOME | End: 2023-09-15
Attending: EMERGENCY MEDICINE
Payer: OTHER GOVERNMENT

## 2023-09-15 VITALS
WEIGHT: 159 LBS | BODY MASS INDEX: 22.76 KG/M2 | HEIGHT: 70 IN | RESPIRATION RATE: 20 BRPM | HEART RATE: 56 BPM | OXYGEN SATURATION: 98 % | DIASTOLIC BLOOD PRESSURE: 60 MMHG | SYSTOLIC BLOOD PRESSURE: 124 MMHG | TEMPERATURE: 98.2 F

## 2023-09-15 DIAGNOSIS — J44.1 COPD EXACERBATION (HCC): Primary | ICD-10-CM

## 2023-09-15 LAB
ALBUMIN SERPL-MCNC: 4.2 G/DL (ref 3.5–5.2)
ALP SERPL-CCNC: 74 U/L (ref 40–129)
ALT SERPL-CCNC: 9 U/L (ref 5–41)
ANION GAP SERPL CALCULATED.3IONS-SCNC: 9 MMOL/L (ref 9–17)
AST SERPL-CCNC: 15 U/L
BASOPHILS # BLD: ABNORMAL K/UL (ref 0–0.2)
BASOPHILS NFR BLD: ABNORMAL % (ref 0–2)
BILIRUB SERPL-MCNC: 0.3 MG/DL (ref 0.3–1.2)
BUN SERPL-MCNC: 19 MG/DL (ref 8–23)
BUN/CREAT SERPL: 21 (ref 9–20)
CALCIUM SERPL-MCNC: 9.3 MG/DL (ref 8.6–10.4)
CHLORIDE SERPL-SCNC: 93 MMOL/L (ref 98–107)
CO2 SERPL-SCNC: 31 MMOL/L (ref 20–31)
CREAT SERPL-MCNC: 0.9 MG/DL (ref 0.7–1.2)
D DIMER PPP FEU-MCNC: 1.36 UG/ML FEU (ref 0–0.59)
DATE LAST DOSE: NORMAL
DIGOXIN DOSE TIME: NORMAL
DIGOXIN DOSE: NORMAL MG
DIGOXIN SERPL-MCNC: 0.8 NG/ML (ref 0.5–2)
EOSINOPHIL # BLD: 0.09 K/UL (ref 0–0.4)
EOSINOPHILS RELATIVE PERCENT: 1 % (ref 0–5)
ERYTHROCYTE [DISTWIDTH] IN BLOOD BY AUTOMATED COUNT: 14.5 % (ref 12.1–15.2)
GFR SERPL CREATININE-BSD FRML MDRD: >60 ML/MIN/1.73M2
GLUCOSE SERPL-MCNC: 108 MG/DL (ref 70–99)
HCT VFR BLD AUTO: 37 % (ref 41–53)
HGB BLD-MCNC: 12.2 G/DL (ref 13.5–17.5)
IMM GRANULOCYTES # BLD AUTO: ABNORMAL K/UL (ref 0–0.3)
IMM GRANULOCYTES NFR BLD: ABNORMAL %
LYMPHOCYTES NFR BLD: 1.46 K/UL (ref 1–4.8)
LYMPHOCYTES RELATIVE PERCENT: 16 % (ref 13–44)
MAGNESIUM SERPL-MCNC: 2.1 MG/DL (ref 1.6–2.6)
MCH RBC QN AUTO: 28.3 PG (ref 26–34)
MCHC RBC AUTO-ENTMCNC: 32.9 G/DL (ref 31–37)
MCV RBC AUTO: 85.9 FL (ref 80–100)
MONOCYTES NFR BLD: 0.09 K/UL (ref 0–1)
MONOCYTES NFR BLD: 1 % (ref 5–9)
MORPHOLOGY: ABNORMAL
NEUTROPHILS NFR BLD: 82 % (ref 39–75)
NEUTS SEG NFR BLD: 7.46 K/UL (ref 2.1–6.5)
PLATELET # BLD AUTO: 221 K/UL (ref 140–450)
POTASSIUM SERPL-SCNC: 4.3 MMOL/L (ref 3.7–5.3)
PROT SERPL-MCNC: 7.3 G/DL (ref 6.4–8.3)
RBC # BLD AUTO: 4.3 M/UL (ref 4.5–5.9)
SARS-COV-2 RDRP RESP QL NAA+PROBE: NOT DETECTED
SODIUM SERPL-SCNC: 133 MMOL/L (ref 135–144)
SPECIMEN DESCRIPTION: NORMAL
TROPONIN I SERPL HS-MCNC: 8 NG/L (ref 0–22)
TROPONIN I SERPL HS-MCNC: 8 NG/L (ref 0–22)
WBC OTHER # BLD: 9.1 K/UL (ref 3.5–11)

## 2023-09-15 PROCEDURE — 6360000002 HC RX W HCPCS: Performed by: EMERGENCY MEDICINE

## 2023-09-15 PROCEDURE — 71275 CT ANGIOGRAPHY CHEST: CPT

## 2023-09-15 PROCEDURE — 87635 SARS-COV-2 COVID-19 AMP PRB: CPT

## 2023-09-15 PROCEDURE — 96374 THER/PROPH/DIAG INJ IV PUSH: CPT

## 2023-09-15 PROCEDURE — 6370000000 HC RX 637 (ALT 250 FOR IP): Performed by: EMERGENCY MEDICINE

## 2023-09-15 PROCEDURE — 99285 EMERGENCY DEPT VISIT HI MDM: CPT

## 2023-09-15 PROCEDURE — 85025 COMPLETE CBC W/AUTO DIFF WBC: CPT

## 2023-09-15 PROCEDURE — C9803 HOPD COVID-19 SPEC COLLECT: HCPCS

## 2023-09-15 PROCEDURE — 84484 ASSAY OF TROPONIN QUANT: CPT

## 2023-09-15 PROCEDURE — 85379 FIBRIN DEGRADATION QUANT: CPT

## 2023-09-15 PROCEDURE — 83735 ASSAY OF MAGNESIUM: CPT

## 2023-09-15 PROCEDURE — 6360000004 HC RX CONTRAST MEDICATION: Performed by: EMERGENCY MEDICINE

## 2023-09-15 PROCEDURE — 94640 AIRWAY INHALATION TREATMENT: CPT

## 2023-09-15 PROCEDURE — 93005 ELECTROCARDIOGRAM TRACING: CPT | Performed by: EMERGENCY MEDICINE

## 2023-09-15 PROCEDURE — 80162 ASSAY OF DIGOXIN TOTAL: CPT

## 2023-09-15 PROCEDURE — 80053 COMPREHEN METABOLIC PANEL: CPT

## 2023-09-15 PROCEDURE — 71045 X-RAY EXAM CHEST 1 VIEW: CPT

## 2023-09-15 PROCEDURE — 36415 COLL VENOUS BLD VENIPUNCTURE: CPT

## 2023-09-15 RX ORDER — FLECAINIDE ACETATE 50 MG/1
50 TABLET ORAL 2 TIMES DAILY
Qty: 60 TABLET | Refills: 0 | Status: SHIPPED | OUTPATIENT
Start: 2023-09-15 | End: 2023-09-15 | Stop reason: ALTCHOICE

## 2023-09-15 RX ORDER — PREDNISONE 20 MG/1
40 TABLET ORAL DAILY
Qty: 10 TABLET | Refills: 0 | Status: SHIPPED | OUTPATIENT
Start: 2023-09-15 | End: 2023-09-20

## 2023-09-15 RX ORDER — AMOXICILLIN AND CLAVULANATE POTASSIUM 875; 125 MG/1; MG/1
1 TABLET, FILM COATED ORAL ONCE
Status: COMPLETED | OUTPATIENT
Start: 2023-09-15 | End: 2023-09-15

## 2023-09-15 RX ORDER — IPRATROPIUM BROMIDE AND ALBUTEROL SULFATE 2.5; .5 MG/3ML; MG/3ML
1 SOLUTION RESPIRATORY (INHALATION) ONCE
Status: COMPLETED | OUTPATIENT
Start: 2023-09-15 | End: 2023-09-15

## 2023-09-15 RX ORDER — AMOXICILLIN AND CLAVULANATE POTASSIUM 875; 125 MG/1; MG/1
1 TABLET, FILM COATED ORAL 2 TIMES DAILY
Qty: 14 TABLET | Refills: 0 | Status: SHIPPED | OUTPATIENT
Start: 2023-09-15 | End: 2023-09-22

## 2023-09-15 RX ADMIN — IOPAMIDOL 100 ML: 755 INJECTION, SOLUTION INTRAVENOUS at 16:30

## 2023-09-15 RX ADMIN — METHYLPREDNISOLONE SODIUM SUCCINATE 125 MG: 125 INJECTION, POWDER, FOR SOLUTION INTRAMUSCULAR; INTRAVENOUS at 15:39

## 2023-09-15 RX ADMIN — IPRATROPIUM BROMIDE AND ALBUTEROL SULFATE 1 DOSE: .5; 3 SOLUTION RESPIRATORY (INHALATION) at 15:42

## 2023-09-15 RX ADMIN — AMOXICILLIN AND CLAVULANATE POTASSIUM 1 TABLET: 875; 125 TABLET, FILM COATED ORAL at 17:43

## 2023-09-15 ASSESSMENT — PAIN - FUNCTIONAL ASSESSMENT: PAIN_FUNCTIONAL_ASSESSMENT: NONE - DENIES PAIN

## 2023-09-15 NOTE — ED NOTES
Ticket to ride completed.  The following information was reported off:  Name  Allergies  Orientation Level  Destination  Safety Issues  Code Status  Oxygen Requirements  Special needs including mobility, language, communication       Ann Hughes RN  09/15/23 9911

## 2023-09-16 LAB
EKG ATRIAL RATE: 59 BPM
EKG P AXIS: 94 DEGREES
EKG P-R INTERVAL: 138 MS
EKG Q-T INTERVAL: 376 MS
EKG QRS DURATION: 82 MS
EKG QTC CALCULATION (BAZETT): 372 MS
EKG R AXIS: 85 DEGREES
EKG T AXIS: 77 DEGREES
EKG VENTRICULAR RATE: 59 BPM

## 2023-09-16 PROCEDURE — 93010 ELECTROCARDIOGRAM REPORT: CPT | Performed by: INTERNAL MEDICINE

## 2023-09-26 ENCOUNTER — OFFICE VISIT (OUTPATIENT)
Dept: CARDIOLOGY CLINIC | Age: 73
End: 2023-09-26
Payer: OTHER GOVERNMENT

## 2023-09-26 VITALS
BODY MASS INDEX: 21.67 KG/M2 | DIASTOLIC BLOOD PRESSURE: 60 MMHG | WEIGHT: 151 LBS | OXYGEN SATURATION: 96 % | HEART RATE: 63 BPM | SYSTOLIC BLOOD PRESSURE: 130 MMHG

## 2023-09-26 DIAGNOSIS — E55.9 VITAMIN D DEFICIENCY DISEASE: ICD-10-CM

## 2023-09-26 DIAGNOSIS — I10 ESSENTIAL HYPERTENSION: ICD-10-CM

## 2023-09-26 DIAGNOSIS — E78.00 PURE HYPERCHOLESTEROLEMIA: ICD-10-CM

## 2023-09-26 DIAGNOSIS — I48.91 ATRIAL FIBRILLATION, UNSPECIFIED TYPE (HCC): Primary | ICD-10-CM

## 2023-09-26 PROCEDURE — 3017F COLORECTAL CA SCREEN DOC REV: CPT | Performed by: INTERNAL MEDICINE

## 2023-09-26 PROCEDURE — G8420 CALC BMI NORM PARAMETERS: HCPCS | Performed by: INTERNAL MEDICINE

## 2023-09-26 PROCEDURE — 4004F PT TOBACCO SCREEN RCVD TLK: CPT | Performed by: INTERNAL MEDICINE

## 2023-09-26 PROCEDURE — 1123F ACP DISCUSS/DSCN MKR DOCD: CPT | Performed by: INTERNAL MEDICINE

## 2023-09-26 PROCEDURE — 3074F SYST BP LT 130 MM HG: CPT | Performed by: INTERNAL MEDICINE

## 2023-09-26 PROCEDURE — 3078F DIAST BP <80 MM HG: CPT | Performed by: INTERNAL MEDICINE

## 2023-09-26 PROCEDURE — 99214 OFFICE O/P EST MOD 30 MIN: CPT | Performed by: INTERNAL MEDICINE

## 2023-09-26 PROCEDURE — G8427 DOCREV CUR MEDS BY ELIG CLIN: HCPCS | Performed by: INTERNAL MEDICINE

## 2023-09-29 PROBLEM — R91.8 MULTIPLE LUNG NODULES: Status: ACTIVE | Noted: 2023-09-29

## 2023-09-29 PROBLEM — J96.12 CHRONIC RESPIRATORY FAILURE WITH HYPOXIA AND HYPERCAPNIA (MULTI): Status: ACTIVE | Noted: 2023-09-29

## 2023-09-29 PROBLEM — J44.9 COPD, SEVERE (MULTI): Status: ACTIVE | Noted: 2023-09-29

## 2023-09-29 PROBLEM — J42 CHRONIC BRONCHITIS (MULTI): Status: ACTIVE | Noted: 2023-09-29

## 2023-09-29 PROBLEM — R06.09 DOE (DYSPNEA ON EXERTION): Status: ACTIVE | Noted: 2023-09-29

## 2023-09-29 PROBLEM — J96.11 CHRONIC RESPIRATORY FAILURE WITH HYPOXIA AND HYPERCAPNIA (MULTI): Status: ACTIVE | Noted: 2023-09-29

## 2023-09-29 PROBLEM — F17.200 NICOTINE ADDICTION: Status: ACTIVE | Noted: 2023-09-29

## 2023-09-29 PROBLEM — J43.9 EMPHYSEMA/COPD (MULTI): Status: ACTIVE | Noted: 2023-09-29

## 2023-09-29 RX ORDER — BUDESONIDE AND FORMOTEROL FUMARATE DIHYDRATE 160; 4.5 UG/1; UG/1
AEROSOL RESPIRATORY (INHALATION)
COMMUNITY

## 2023-09-29 RX ORDER — ASPIRIN 81 MG/1
TABLET ORAL
COMMUNITY

## 2023-09-29 RX ORDER — DILTIAZEM HYDROCHLORIDE 30 MG/1
125 TABLET, FILM COATED ORAL DAILY
COMMUNITY

## 2023-09-29 RX ORDER — FERROUS SULFATE 325(65) MG
TABLET, DELAYED RELEASE (ENTERIC COATED) ORAL
COMMUNITY

## 2023-09-29 RX ORDER — DIGOXIN 125 MCG
TABLET ORAL
COMMUNITY

## 2023-09-29 RX ORDER — EPLERENONE 25 MG/1
TABLET, FILM COATED ORAL
COMMUNITY

## 2023-09-29 RX ORDER — ALBUTEROL SULFATE 90 UG/1
AEROSOL, METERED RESPIRATORY (INHALATION)
COMMUNITY

## 2023-10-03 NOTE — PROGRESS NOTES
Ov DR Chavez Sides 6 week follow up   Review event monitor. On oxygen 2.5 L   No chest pain  No episodes of palpitations. Was in ED for sob  Was in a fib at that time. Lasted 7 days per wife  No edema  With episode of fluttering  Upper chest discomfort. Wants off some of the water   Pills   HOLD lasix now   Take inspra every day.
20 mg daily. PHYSICAL EXAMINATION:  VITAL SIGNS:  His blood pressure was 130/60 with a heart rate of 63 and regular. Respiratory rate 18. O2 sat 96%. Weight 151 pounds. GENERAL:  He is a pleasant 68-year-old gentleman. Denied pain. He was oriented to person, place and time. Answered questions appropriately. SKIN:  No unusual skin changes. HEENT:  The pupils are equally round and intact. Mucous membranes were dry. NECK:  No JVD. Good carotid pulses. No carotid bruits. No lymphadenopathy or thyromegaly. CARDIOVASCULAR EXAM:  S1 and S2 were normal.  No S3 or S4. Soft systolic murmur. No diastolic murmur. PMI was normal.  No lift, thrust, or pericardial friction rub. LUNGS:  Clear to auscultation and percussion. ABDOMEN:  Soft and nontender. Good bowel sounds. EXTREMITIES:  Good femoral pulses. Good pedal pulses. No pedal edema. Skin was warm and dry. No calf tenderness. Nail beds pink. Good cap refill. PULSES:  Bilateral symmetrical radial, brachial and carotid pulses. No carotid bruits. Good femoral and pedal pulses. NEUROLOGIC EXAM:  Within normal limits. PSYCHIATRIC EXAM:  Within normal limits. IMPRESSION:  1. Paroxysmal atrial fibrillation with fairly controlled ventricular response. 2.  Not anticoagulated by his choice. 3.  Moderate-to-severe pulmonary hypertension with a PA pressure of 60 mm.  4.  Normal LV function, EF of 55%. 5.  A 3.5 x 3.9 infrarenal aortic aneurysm, which is why he does not want to be anticoagulated. 6.  Continued smoking abuse. 7.  Shortness of breath and severe COPD, on 2 to 3 liters per minute of oxygen, 24 hours a day. PLAN:  1. Hold Lasix now. 2.  Take Inspra 25 mg half a tablet daily. 3.  Call us, if he starts developing edema. DISCUSSION:  Mr. Ca Crum continues to struggle. His shortness of breath is worsening and he is on 24-hour oxygen.   He continues to have intermittent atrial fibrillation and he does not want to be

## 2023-10-04 ENCOUNTER — APPOINTMENT (OUTPATIENT)
Dept: PULMONOLOGY | Facility: CLINIC | Age: 73
End: 2023-10-04
Payer: OTHER GOVERNMENT

## 2023-10-17 ENCOUNTER — OFFICE VISIT (OUTPATIENT)
Dept: PULMONOLOGY | Facility: CLINIC | Age: 73
End: 2023-10-17
Payer: OTHER GOVERNMENT

## 2023-10-17 ENCOUNTER — HOSPITAL ENCOUNTER (OUTPATIENT)
Dept: RESPIRATORY THERAPY | Facility: HOSPITAL | Age: 73
Discharge: HOME | End: 2023-10-17
Payer: OTHER GOVERNMENT

## 2023-10-17 VITALS
OXYGEN SATURATION: 95 % | HEIGHT: 72 IN | WEIGHT: 152.8 LBS | TEMPERATURE: 97.5 F | SYSTOLIC BLOOD PRESSURE: 136 MMHG | DIASTOLIC BLOOD PRESSURE: 68 MMHG | HEART RATE: 57 BPM | BODY MASS INDEX: 20.7 KG/M2

## 2023-10-17 DIAGNOSIS — J43.9 PULMONARY EMPHYSEMA, UNSPECIFIED EMPHYSEMA TYPE (MULTI): ICD-10-CM

## 2023-10-17 DIAGNOSIS — J96.12 CHRONIC RESPIRATORY FAILURE WITH HYPOXIA AND HYPERCAPNIA (MULTI): Primary | ICD-10-CM

## 2023-10-17 DIAGNOSIS — J41.0 SIMPLE CHRONIC BRONCHITIS (MULTI): ICD-10-CM

## 2023-10-17 DIAGNOSIS — J96.11 CHRONIC RESPIRATORY FAILURE WITH HYPOXIA AND HYPERCAPNIA (MULTI): Primary | ICD-10-CM

## 2023-10-17 LAB
ANION GAP BLDA CALCULATED.4IONS-SCNC: 1 MMO/L (ref 10–25)
BASE EXCESS BLDA CALC-SCNC: 12 MMOL/L (ref -2–3)
BODY TEMPERATURE: 37 DEGREES CELSIUS
CA-I BLDA-SCNC: 1.24 MMOL/L (ref 1.1–1.33)
CHLORIDE BLDA-SCNC: 102 MMOL/L (ref 98–107)
GLUCOSE BLDA-MCNC: 91 MG/DL (ref 74–99)
HCO3 BLDA-SCNC: 38.5 MMOL/L (ref 22–26)
HCT VFR BLD EST: 37 % (ref 41–52)
HGB BLDA-MCNC: 12.3 G/DL (ref 13.5–17.5)
INHALED O2 CONCENTRATION: 24 %
LACTATE BLDA-SCNC: 1.2 MMOL/L (ref 0.4–2)
OXYHGB MFR BLDA: 93 % (ref 94–98)
PCO2 BLDA: 58 MM HG (ref 38–42)
PH BLDA: 7.43 PH (ref 7.38–7.42)
PO2 BLDA: 71 MM HG (ref 85–95)
POTASSIUM BLDA-SCNC: 4.4 MMOL/L (ref 3.5–5.3)
SAO2 % BLDA: 95 % (ref 94–100)
SODIUM BLDA-SCNC: 137 MMOL/L (ref 136–145)

## 2023-10-17 PROCEDURE — 1159F MED LIST DOCD IN RCRD: CPT | Performed by: INTERNAL MEDICINE

## 2023-10-17 PROCEDURE — 36600 WITHDRAWAL OF ARTERIAL BLOOD: CPT

## 2023-10-17 PROCEDURE — 82947 ASSAY GLUCOSE BLOOD QUANT: CPT | Performed by: INTERNAL MEDICINE

## 2023-10-17 PROCEDURE — 83605 ASSAY OF LACTIC ACID: CPT | Performed by: INTERNAL MEDICINE

## 2023-10-17 PROCEDURE — 99215 OFFICE O/P EST HI 40 MIN: CPT | Performed by: INTERNAL MEDICINE

## 2023-10-17 PROCEDURE — 82330 ASSAY OF CALCIUM: CPT | Performed by: INTERNAL MEDICINE

## 2023-10-17 RX ORDER — PREDNISONE 10 MG/1
TABLET ORAL
Qty: 18 TABLET | Refills: 0 | Status: SHIPPED | OUTPATIENT
Start: 2023-10-17 | End: 2023-11-16

## 2023-10-17 RX ORDER — METOPROLOL SUCCINATE 25 MG/1
25 TABLET, EXTENDED RELEASE ORAL DAILY
COMMUNITY

## 2023-10-17 RX ORDER — BUDESONIDE, GLYCOPYRROLATE, AND FORMOTEROL FUMARATE 160; 9; 4.8 UG/1; UG/1; UG/1
2 AEROSOL, METERED RESPIRATORY (INHALATION)
COMMUNITY

## 2023-10-17 NOTE — PROGRESS NOTES
Subjective   Patient ID: Finesse Wiggins is a 72 y.o. male who presents for Emphysema (4 MONTH CK/HERE WITH WIFE, ANDRESSA).  HPI  Patient was seen today on a 4-month follow-up visit for his severe emphysema.  He was accompanied by his wife Andressa.  He is currently on Breztri at 2 and elations twice daily.  He has noted increased shortness of breath over the past 2 weeks.  He continues to smoke couple cigarettes per day.  He does have a cough with frequent phlegm production and periodically has wheezing.  He was in the urgent care month ago and was in atrial fib and that hasSince improved.  He reports that they also had him on course of steroids but he attributed this to causing his atrial fibrillation and I told him this was unlikely but mostly due to his nebulized short acting beta agonist medications.  Patient reports having some dyspnea with daily activities.  Arterial blood gas on an OCD at #2 was 7.43 PCO2 is 58 PO2 is 71 and 95% saturated.  He also has nebulized albuterol treatments and also albuterol inhaler.  Review of Systems  Patient denies having any fever, chills, rhinitis or sore throat.  As stated above he is still a daily smoker.  I had a long discussion with him in regards to using some nicotine lozenges in place of the couple cigarettes that he is smoking per day but he seemed very resistant to any change.  Objective   Physical Exam  HEENT, there is no inflammation noted on examination of the oropharynx.  Pulmonary, diminished breath sounds bilaterally with some end expiratory wheezes.  Cardio, heart sounds were regular rate and rhythm today.  Extremities, no cyanosis, clubbing or pretibial edema.  Psych, the patient was alert and oriented x3.  Assessment/Plan     Assessment:  1.  Severe emphysema and chronic bronchitis.  2.  Hypoxic, hypercarbic chronic respiratory failure.  Plan:  1.  I placed him on a short burst of prednisone over the next 9 days at 30 mg for 3 days 20 for 3 days and 10 for 3 days.   He was resistant to being on a longer course because of his misconception that the steroids were causing his atrial fibrillation.  2.  Consider using nicotine lozenges versus smoking cigarettes which will still give him the nicotine but not the side effects that worsen his lung function.  3.  I would recommend that he remain at 2 L of oxygen or OCD at #2 around-the-clock.  4.  Follow-up with the patient in 10 days.  5.  PA and lateral chest x-ray today to rule out any underlying pathology.      This note was transcribed using the Dragon Dictation system.  There may be grammatical, punctuation, or verbiage errors that occur with voice recognition programs.

## 2023-10-25 ENCOUNTER — APPOINTMENT (OUTPATIENT)
Dept: RESPIRATORY THERAPY | Facility: HOSPITAL | Age: 73
End: 2023-10-25
Payer: OTHER GOVERNMENT

## 2023-10-26 ENCOUNTER — APPOINTMENT (OUTPATIENT)
Dept: PULMONOLOGY | Facility: CLINIC | Age: 73
End: 2023-10-26
Payer: OTHER GOVERNMENT

## 2023-11-06 ENCOUNTER — HOSPITAL ENCOUNTER (OUTPATIENT)
Dept: RESPIRATORY THERAPY | Facility: HOSPITAL | Age: 73
Discharge: HOME | End: 2023-11-06
Payer: OTHER GOVERNMENT

## 2023-11-06 DIAGNOSIS — J43.9 PULMONARY EMPHYSEMA, UNSPECIFIED EMPHYSEMA TYPE (MULTI): ICD-10-CM

## 2023-11-06 PROCEDURE — 82805 BLOOD GASES W/O2 SATURATION: CPT | Performed by: INTERNAL MEDICINE

## 2023-11-06 PROCEDURE — 36600 WITHDRAWAL OF ARTERIAL BLOOD: CPT

## 2023-11-07 LAB
BASE EXCESS BLDA CALC-SCNC: 7.5 MMOL/L (ref -2–3)
BODY TEMPERATURE: 37 DEGREES CELSIUS
HCO3 BLDA-SCNC: 33.2 MMOL/L (ref 22–26)
INHALED O2 CONCENTRATION: 28 %
OXYHGB MFR BLDA: 93.4 % (ref 94–98)
PCO2 BLDA: 50 MM HG (ref 38–42)
PH BLDA: 7.43 PH (ref 7.38–7.42)
PO2 BLDA: 77 MM HG (ref 85–95)
SAO2 % BLDA: 96 % (ref 94–100)

## 2023-11-13 ENCOUNTER — OFFICE VISIT (OUTPATIENT)
Dept: PULMONOLOGY | Facility: CLINIC | Age: 73
End: 2023-11-13
Payer: OTHER GOVERNMENT

## 2023-11-13 VITALS
TEMPERATURE: 98 F | SYSTOLIC BLOOD PRESSURE: 139 MMHG | WEIGHT: 154.2 LBS | HEART RATE: 66 BPM | BODY MASS INDEX: 20.91 KG/M2 | DIASTOLIC BLOOD PRESSURE: 81 MMHG | OXYGEN SATURATION: 94 %

## 2023-11-13 DIAGNOSIS — J96.11 CHRONIC RESPIRATORY FAILURE WITH HYPOXIA AND HYPERCAPNIA (MULTI): ICD-10-CM

## 2023-11-13 DIAGNOSIS — J43.2 CENTRILOBULAR EMPHYSEMA (MULTI): Primary | ICD-10-CM

## 2023-11-13 DIAGNOSIS — F17.218 CIGARETTE NICOTINE DEPENDENCE WITH OTHER NICOTINE-INDUCED DISORDER: ICD-10-CM

## 2023-11-13 DIAGNOSIS — J96.12 CHRONIC RESPIRATORY FAILURE WITH HYPOXIA AND HYPERCAPNIA (MULTI): ICD-10-CM

## 2023-11-13 PROCEDURE — 99214 OFFICE O/P EST MOD 30 MIN: CPT | Performed by: INTERNAL MEDICINE

## 2023-11-13 PROCEDURE — 1159F MED LIST DOCD IN RCRD: CPT | Performed by: INTERNAL MEDICINE

## 2023-11-13 NOTE — PROGRESS NOTES
Subjective   Patient ID: Finesse Wiggins is a 72 y.o. male who presents for Bronchitis, hypoxic, Emphysema, and Results (PFT results).  HPI  Patient was seen today on a 1 month follow-up visit in the office for his emphysema and chronic bronchitis.  He also has hypoxic, hypercarbic chronic respiratory failure.  Reports having some cough with clear sputum production that at times is tenacious.  He has no significant or rare wheezing today compared to his visit from 3 weeks ago.  He is short of breath walking from his car to the office and somewhat with ADLs.  Review of Systems  Patient denies fever, chills, rhinitis or sore throat.  All other review of systems were noncontributory.  Refer to the [facility] I did compare for him his last 2 blood gases from 10/17 and 11/6/2023 most recent study shows pH 7.43 PCO2 of 50 PO2 of 77 and 96% saturated on 2 L of oxygen the earlier blood gas shows a pH of 7.43 PCO2 of 58 and a PO2 of 71 with 95% saturated.  Objective   Physical Exam  Pulmonary, diminished breath sounds with some rare expiratory wheezes.  Cardio, heart sounds are regular rate and rhythm.  Extremities, no cyanosis, clubbing or pretibial edema.  Psych, the patient is alert and oriented x3  Patient does not appear dyspneic at rest in the office.    Assessment/Plan     Impressions:  1.  Very severe emphysema.  2.  Chronic respiratory failure with hypoxia and hypercarbia but improved.  3.  Nicotine addiction.  In spite of my encouragement the patient still wishes to continue smoking.    Recommendations: 1.  Stay on bronchodilator therapy which is the Breztri and albuterol with care in the frequency of albuterol treatments which could cause atrial fibrillation.  2.  Continues Mucinex D for congestion 1 tab p.o. daily to twice daily at maximum.  3.  Follow-up with the patient in 1 month.      This note was transcribed using the Dragon Dictation system.  There may be grammatical, punctuation, or verbiage errors that occur  with voice recognition programs.

## 2023-12-08 ENCOUNTER — HOSPITAL ENCOUNTER (OUTPATIENT)
Dept: RESPIRATORY THERAPY | Facility: HOSPITAL | Age: 73
Discharge: HOME | End: 2023-12-08
Payer: OTHER GOVERNMENT

## 2023-12-08 ENCOUNTER — OFFICE VISIT (OUTPATIENT)
Dept: PULMONOLOGY | Facility: CLINIC | Age: 73
End: 2023-12-08
Payer: OTHER GOVERNMENT

## 2023-12-08 VITALS
SYSTOLIC BLOOD PRESSURE: 131 MMHG | OXYGEN SATURATION: 96 % | WEIGHT: 155.8 LBS | HEART RATE: 69 BPM | DIASTOLIC BLOOD PRESSURE: 71 MMHG | BODY MASS INDEX: 21.13 KG/M2 | TEMPERATURE: 97.5 F

## 2023-12-08 DIAGNOSIS — J41.0 SIMPLE CHRONIC BRONCHITIS (MULTI): ICD-10-CM

## 2023-12-08 DIAGNOSIS — J40 BRONCHITIS: ICD-10-CM

## 2023-12-08 DIAGNOSIS — J20.9 ACUTE BRONCHITIS, UNSPECIFIED ORGANISM: ICD-10-CM

## 2023-12-08 DIAGNOSIS — J96.11 CHRONIC RESPIRATORY FAILURE WITH HYPOXIA AND HYPERCAPNIA (MULTI): Primary | ICD-10-CM

## 2023-12-08 DIAGNOSIS — J43.2 CENTRILOBULAR EMPHYSEMA (MULTI): ICD-10-CM

## 2023-12-08 DIAGNOSIS — J96.12 CHRONIC RESPIRATORY FAILURE WITH HYPOXIA AND HYPERCAPNIA (MULTI): Primary | ICD-10-CM

## 2023-12-08 DIAGNOSIS — F17.200 CURRENT EVERY DAY SMOKER: ICD-10-CM

## 2023-12-08 PROCEDURE — 99214 OFFICE O/P EST MOD 30 MIN: CPT | Performed by: INTERNAL MEDICINE

## 2023-12-08 PROCEDURE — 1159F MED LIST DOCD IN RCRD: CPT | Performed by: INTERNAL MEDICINE

## 2023-12-08 PROCEDURE — 94669 MECHANICAL CHEST WALL OSCILL: CPT

## 2023-12-08 PROCEDURE — 9420000001 HC RT PATIENT EDUCATION 5 MIN

## 2023-12-08 RX ORDER — AZITHROMYCIN 250 MG/1
TABLET, FILM COATED ORAL
Qty: 6 TABLET | Refills: 0 | Status: SHIPPED | OUTPATIENT
Start: 2023-12-08 | End: 2023-12-13

## 2023-12-08 NOTE — PROGRESS NOTES
Subjective   Patient ID: Finesse Wiggins is a 72 y.o. male who presents for Bronchitis and Emphysema.  HPI  Patient was was seen today in the office on a 1 month follow-up visit.  He reports having a recent bronchitis with some green mucus production when he coughs.  He does have severe emphysema and hypoxemia.  His oxygen saturation today on POC at #2 was 94%.  His bronchodilator therapy he has Breztri at 2 inhalations twice daily and albuterol inhaler at 2 puffs 4 times daily as needed shortness of breath.  He should also be on mucus and Mucinex at 1 tablet twice a day maximum for congestion.  I did make arrangements for him to obtain a flutter valve from the hospital that he can use up to 4 times a day to help with clear airway secretions.  Review of Systems  Patient reports no fever, chills, rhinitis or sore throat.  He is smoking on a daily basis.  All other review of systems were noncontributory.  Refer to the HPI.  Objective   Physical Exam  HEENT, there is no inflammation noted on examination of the oropharynx.  Pulmonary, there is expiratory rhonchi in the lung fields bilaterally.  There is equal breath sounds present.  Cardio, heart sounds are regular rate and rhythm.  There was no S3 or murmur.  Extremities, no cyanosis, clubbing or pretibial edema.  Psych, the patient is alert and oriented x 3.  Assessment/Plan        Impressions  1.  Very severe emphysema.  2.  Acute bronchitis./chronic Bronchitis  3.  Chronic hypoxic, hypercarbic respiratory failure.  4.  Chronic smoking.  Recommendations:  1.  He was given a prescription for Zithromax Z-KULWINDER.  2.  He will be given instructions on the use of his flutter valve from the hospital staff and that should be used up to 4 times per day.  3.  Continue with use of Breztri and albuterol as described above.  4.  2 L of oxygen around-the-clock.  When he is out of his house he could use a POC at #2.  5.  Follow-up with the patient in 2 months.      This note was  transcribed using the Dragon Dictation system.  There may be grammatical, punctuation, or verbiage errors that occur with voice recognition programs.    Mckay Goncalves DO 12/08/23 2:09 PM

## 2023-12-16 ENCOUNTER — HOSPITAL ENCOUNTER (EMERGENCY)
Age: 73
Discharge: HOME | End: 2023-12-16
Attending: EMERGENCY MEDICINE
Payer: MEDICARE

## 2023-12-16 VITALS
WEIGHT: 152.9 LBS | HEIGHT: 70 IN | TEMPERATURE: 97.9 F | BODY MASS INDEX: 21.89 KG/M2 | OXYGEN SATURATION: 93 % | RESPIRATION RATE: 18 BRPM | SYSTOLIC BLOOD PRESSURE: 152 MMHG | DIASTOLIC BLOOD PRESSURE: 67 MMHG | HEART RATE: 62 BPM

## 2023-12-16 DIAGNOSIS — J44.1 COPD EXACERBATION (HCC): Primary | ICD-10-CM

## 2023-12-16 LAB
FLUAV AG SPEC QL: NEGATIVE
FLUBV AG SPEC QL: NEGATIVE
SARS-COV-2 RDRP RESP QL NAA+PROBE: NOT DETECTED
SPECIMEN DESCRIPTION: NORMAL

## 2023-12-16 PROCEDURE — 87804 INFLUENZA ASSAY W/OPTIC: CPT

## 2023-12-16 PROCEDURE — 87635 SARS-COV-2 COVID-19 AMP PRB: CPT

## 2023-12-16 RX ORDER — PREDNISONE 20 MG/1
TABLET ORAL
Qty: 10 TABLET | Refills: 0 | Status: SHIPPED | OUTPATIENT
Start: 2023-12-16

## 2023-12-16 RX ORDER — DOXYCYCLINE HYCLATE 100 MG
100 TABLET ORAL 2 TIMES DAILY
Qty: 14 TABLET | Refills: 0 | Status: SHIPPED | OUTPATIENT
Start: 2023-12-16 | End: 2023-12-23

## 2023-12-16 ASSESSMENT — PAIN - FUNCTIONAL ASSESSMENT: PAIN_FUNCTIONAL_ASSESSMENT: NONE - DENIES PAIN

## 2023-12-29 ENCOUNTER — OFFICE VISIT (OUTPATIENT)
Dept: URGENT CARE | Facility: CLINIC | Age: 73
End: 2023-12-29
Payer: OTHER GOVERNMENT

## 2023-12-29 VITALS
HEIGHT: 70 IN | BODY MASS INDEX: 21.47 KG/M2 | OXYGEN SATURATION: 95 % | SYSTOLIC BLOOD PRESSURE: 139 MMHG | RESPIRATION RATE: 16 BRPM | DIASTOLIC BLOOD PRESSURE: 61 MMHG | WEIGHT: 150 LBS | TEMPERATURE: 98.2 F | HEART RATE: 67 BPM

## 2023-12-29 DIAGNOSIS — U07.1 COVID-19 VIRUS INFECTION: Primary | ICD-10-CM

## 2023-12-29 DIAGNOSIS — J20.9 ACUTE BRONCHITIS, UNSPECIFIED ORGANISM: ICD-10-CM

## 2023-12-29 LAB
POC RAPID INFLUENZA A: NEGATIVE
POC RAPID INFLUENZA B: NEGATIVE
POC RSV RAPID ANTIGEN: NEGATIVE
POC SARS-COV-2 AG: ABNORMAL

## 2023-12-29 PROCEDURE — 99214 OFFICE O/P EST MOD 30 MIN: CPT | Mod: 25

## 2023-12-29 PROCEDURE — 87804 INFLUENZA ASSAY W/OPTIC: CPT

## 2023-12-29 RX ORDER — AMOXICILLIN AND CLAVULANATE POTASSIUM 875; 125 MG/1; MG/1
1 TABLET, FILM COATED ORAL 2 TIMES DAILY
Qty: 20 TABLET | Refills: 0 | Status: SHIPPED | OUTPATIENT
Start: 2023-12-29 | End: 2024-01-08

## 2023-12-29 RX ORDER — PREDNISONE 20 MG/1
TABLET ORAL
Qty: 3 TABLET | Refills: 0 | Status: SHIPPED | OUTPATIENT
Start: 2023-12-29

## 2023-12-29 NOTE — PROGRESS NOTES
Located within [facility]  Mis Arzate, APRN-CNP     Visit Note - 12/29/2023 4:00 PM   This note was generated with voice recognition software and may contain errors including spelling, grammar, syntax, and misrecognization of what was dictated.    Patient: Finesse Wiggins, MRN: [id number], 73 y.o., male   PCP: Destiny Robert MD  ------------------------------------  ALLERGIES:   Allergies   Allergen Reactions    Codeine Anaphylaxis and Anxiety    Atorvastatin Myalgia     Other reaction(s): Cramp    Morphine Hallucinations        CURRENT MEDICATIONS:   Current Outpatient Medications   Medication Instructions    albuterol 90 mcg/actuation inhaler No dose, route, or frequency recorded.    amoxicillin-pot clavulanate (Augmentin) 875-125 mg tablet 875 mg, oral, 2 times daily, Take with a meal.    aspirin 81 mg EC tablet No dose, route, or frequency recorded.    budesonide-formoteroL (Symbicort) 160-4.5 mcg/actuation inhaler No dose, route, or frequency recorded.    budesonide-glycopyr-formoterol (Breztri Aerosphere) 160-9-4.8 mcg/actuation HFA aerosol inhaler 2 puffs, inhalation, 2 times daily RT    digoxin (Lanoxin) 125 MCG tablet No dose, route, or frequency recorded.    dilTIAZem (CARDIZEM) 125 mg, oral, Daily    eplerenone (Inspra) 25 mg tablet No dose, route, or frequency recorded.    ferrous sulfate 325 (65 Fe) MG EC tablet No dose, route, or frequency recorded.    metoprolol succinate XL (TOPROL-XL) 25 mg, oral, Daily, Do not crush or chew.    oxygen (O2) gas therapy 2 L, inhalation    predniSONE (Deltasone) 20 mg tablet Take 2 tablets orally once a day x 3 days, then take 1 tablet once a day x 3 days. Take with a meal.     ------------------------------------  PAST MEDICAL HX:  Patient Active Problem List   Diagnosis    Chronic bronchitis (CMS/HCC)    Chronic respiratory failure with hypoxia and hypercapnia (CMS/HCC)    COPD, severe (CMS/HCC)    MONTELONGO (dyspnea on exertion)    Emphysema/COPD  (CMS/[facility])    Multiple lung nodules    Nicotine addiction      SURGICAL HX:  History reviewed. No pertinent surgical history.   FAMILY HX:   No pertinent history.   SOCIAL HX:    reports that he has been smoking cigarettes. He has never used smokeless tobacco.  ------------------------------------  CHIEF COMPLAINT:   Chief Complaint   Patient presents with    URI     Cough and chest congestion X 27 days       HISTORY OF PRESENT ILLNESS: The history was obtained from patient and his wife. Finesse is a 73 y.o. male, who presents with a chief complaint of a harsh, persistent, productive cough with white phlegm x approx 4 weeks- reports has been on 2 courses of antibiotics (Zithromax from his Pulmonologist, and Doxycycline from the ER) + prednisone - feels sxs improved slightly, but never had notable relief. Reports has also had intermittent wheezing, low BP's (100/52, 100/48) but insists he has not had any lightheadedness. Has had fatigue. Denies any abdominal pain, chest pain, back pain, rashes, urinary symptoms, nausea/vomiting, and diarrhea. Denies any changes in mental status. No new swelling in legs. Appetite is poor but is able to eat and drink fluids without difficulty; denies loss of sense of taste or smell. Reports symptoms have gotten worse since onset- reports he feels like his COPD is still flared. Has not been taking any over-the-counter medications or home remedies for symptom management. Has been doing nebulizer treatments 4 times daily.  His wife is currently ill with similar sxs; no other known ill contacts. Has received the COVID vaccine x 2 . No known history of COVID infection.  Is a current smoker.  Follows with pulmonology for management of chronic respiratory failure, and also follows with Cardiology regularly for evaluation of his A. Fib and aortic aneurysm, but has not yet contacted either one of them about his persisting symptoms. Reports he is unwilling to consider seeking care at the ER,  "despite his wife's insistence - requesting rx for Augmentin - \"that's what always helps.\"      REVIEW OF SYSTEMS:  10 systems reviewed negative with exception of history of present illness as listed above.    TODAY'S VITALS: /61   Pulse 67   Temp 36.8 °C (98.2 °F)   Resp 16   Ht 1.778 m (5' 10\")   Wt 68 kg (150 lb)   SpO2 95% Comment: 2 L O2  BMI 21.52 kg/m²     PHYSICAL EXAMINATION:  General:  Mildly ill-appearing, well nourished male; alert and oriented; in no acute distress. Sitting comfortably on exam table. Mildly-dyspneic at rest.   Eyes:  Pupils equal, round and reactive to light. No conjunctival erythema; no scleral icterus.   HENT: No sinus tenderness; + mild audible nasal congestion. Airway patent, Bilat TMs unremarkable, ear canals clear/unremarkable bilaterally. Nasal mucosa mildly injected and edematous. Oral mucosa moist. Posterior pharynx mildly injected but without vesicles or oropharyngeal exudate aside from PND. Uvula is midline. Managing oral secretions without difficulty.  Neck:  Supple. No palpable lymphadenopathy bilat. Trachea is midline.  Respiratory:  Respirations easy and unlabored, Breath sounds equal. Lungs with scattered rhonchi and wheezing that do not clear with cough, no rales; + accessory muscle use. Breath sounds diminished throughout. Harsh, semi-productive cough noted. Mildly dyspneic with ambulation; able to maintain SpO2.  Cardiovascular:  Normal rate, Regular rhythm. Normal S1S2. No m/r/g. Trace pitting edema to bilat Les.   Gastrointestinal:  Soft, non-tender, non-distended; no palpable masses or organomegaly. Bowel sounds normoactive.  Musculoskeletal:  Grossly normal; appropriate for age.     Integumentary:  Pink, warm, dry, and intact. No rashes or skin discoloration appreciated. Good skin turgor.  Neurologic:  Alert and oriented, no gross deficits.    Cognition and Speech:  Oriented, Speech clear and coherent.    Psychiatric:  Cooperative, Appropriate mood & " affect.      ------------------------------------  Medical Decision Making  LABORATORY or RADIOLOGICAL IMAGING ORDERS/RESULTS:   Recent Results (from the past 200 hour(s))   POCT BD Veritor Covid-19 Ag manually resulted    Collection Time: 12/29/23  4:07 PM   Result Value Ref Range    POC SARS-CoV Ag Positive test for SARS-CoV-2 (antigen detected) (A) Presumptive negative test for SARS-CoV-2 (no antigen detected)   POCT respiratory syncytial virus manually resulted    Collection Time: 12/29/23  4:07 PM   Result Value Ref Range    RSV Rapid Ag Negative Negative   POCT Influenza A/B manually resulted    Collection Time: 12/29/23  4:08 PM   Result Value Ref Range    POC Rapid Influenza A Negative Negative    POC Rapid Influenza B Negative Negative       IMPRESSION/PLAN:  Course: Worsening; stable    1. COVID-19 virus infection  2. Acute bronchitis, unspecified organism  - amoxicillin-pot clavulanate (Augmentin) 875-125 mg tablet; Take 1 tablet (875 mg) by mouth 2 times a day for 10 days. Take with a meal.  Dispense: 20 tablet; Refill: 0  - predniSONE (Deltasone) 20 mg tablet; Take 2 tablets orally once a day x 3 days, then take 1 tablet once a day x 3 days. Take with a meal.  Dispense: 3 tablet; Refill: 0  - POCT BD Veritor Covid-19 Ag manually resulted  - POCT Influenza A/B manually resulted  - POCT respiratory syncytial virus manually resulted    Due to severity of sxs, his reported low BP's over the past week, increased supplemental oxygen needs, fatigue, and minimal response to both Zithromax and Doxycycline over the past 4 weeks, strongly urged to consider seeking care at the ER, but patient refusing. Extensive discussion re: my concerns and the reasons I would recommend ER. He was able to verbalize clear understanding of the risks of delaying more advanced evaluation (labs, CXR, cardiac workup, etc) and care, but still not receptive, and he continued to decline any type of further evaluation/consultation, despite  "also his wife's concerns. Discussed that we are unable to rule out cardiac etiology (or progressive pulmonary etiology) without further testing- has extensive health history which could be contributing to his recent sxs. Patient states, \"I hear what you're saying, and I know full well what could happen... I just want you to give me Augmentin so I can go home to see if it works.\" He did agree to COVID/influenza/RSV testing - result was + for COVID. Due to duration of sxs, is not a candidate for Paxlovid, but urged to contact his pulmonologist/PCP ASAP to discuss. He declined nebulizer treatment in office and also requested to defer CXR - again, after a selene discussion of risks of delaying workup.     Per his request, will begin course of Augmentin + Prednisone today; advised should continue home nebulizer treatments and use of incentive spirometer. Instructed to push fluids, rest, and to use appropriate over the counter medications as needed for management of symptoms - plain Mucinex may be helpful. Stressed importance of close monitoring of BP. Reviewed instructions for self-isolation and continued monitoring. Reviewed red flags to monitor for, counseled on potential adverse reactions of treatments, expectations for improvement in sxs, and advised to follow-up with primary care provider in 12-24 hours if symptoms persist, or to seek care sooner if worsening or if any additional concerns/red flags develop (or if he reconsiders). Patient/wife agreed with plan of care; questions were encouraged and answered.      LUNA Raines-CNP   Advanced Practice Provider  [facility]  "

## 2024-01-05 ENCOUNTER — APPOINTMENT (OUTPATIENT)
Dept: PULMONOLOGY | Facility: CLINIC | Age: 74
End: 2024-01-05
Payer: OTHER GOVERNMENT

## 2024-01-11 ENCOUNTER — TELEPHONE (OUTPATIENT)
Dept: PULMONOLOGY | Facility: CLINIC | Age: 74
End: 2024-01-11

## 2024-02-09 ENCOUNTER — APPOINTMENT (OUTPATIENT)
Dept: PULMONOLOGY | Facility: CLINIC | Age: 74
End: 2024-02-09
Payer: OTHER GOVERNMENT

## 2024-03-08 ENCOUNTER — APPOINTMENT (OUTPATIENT)
Dept: PULMONOLOGY | Facility: CLINIC | Age: 74
End: 2024-03-08
Payer: OTHER GOVERNMENT

## 2024-03-13 ENCOUNTER — HOSPITAL ENCOUNTER (OUTPATIENT)
Age: 74
Discharge: HOME | End: 2024-03-13
Payer: MEDICARE

## 2024-03-13 DIAGNOSIS — I48.91 ATRIAL FIBRILLATION, UNSPECIFIED TYPE (HCC): ICD-10-CM

## 2024-03-13 DIAGNOSIS — I10 ESSENTIAL HYPERTENSION: ICD-10-CM

## 2024-03-13 DIAGNOSIS — E78.00 PURE HYPERCHOLESTEROLEMIA: ICD-10-CM

## 2024-03-13 DIAGNOSIS — E55.9 VITAMIN D DEFICIENCY DISEASE: ICD-10-CM

## 2024-03-13 LAB
ALBUMIN SERPL-MCNC: 3.9 G/DL (ref 3.5–5.2)
ALP SERPL-CCNC: 72 U/L (ref 40–129)
ALT SERPL-CCNC: 10 U/L (ref 5–41)
ANION GAP SERPL CALCULATED.3IONS-SCNC: 9 MMOL/L (ref 9–17)
AST SERPL-CCNC: 14 U/L
BASOPHILS # BLD: 0.03 K/UL (ref 0–0.2)
BASOPHILS NFR BLD: 0 % (ref 0–2)
BILIRUB SERPL-MCNC: 0.5 MG/DL (ref 0.3–1.2)
BUN SERPL-MCNC: 15 MG/DL (ref 8–23)
BUN/CREAT SERPL: 15 (ref 9–20)
CALCIUM SERPL-MCNC: 9.6 MG/DL (ref 8.6–10.4)
CHLORIDE SERPL-SCNC: 98 MMOL/L (ref 98–107)
CO2 SERPL-SCNC: 31 MMOL/L (ref 20–31)
CREAT SERPL-MCNC: 1 MG/DL (ref 0.7–1.2)
EOSINOPHIL # BLD: 0.14 K/UL (ref 0–0.4)
EOSINOPHILS RELATIVE PERCENT: 2 % (ref 0–5)
ERYTHROCYTE [DISTWIDTH] IN BLOOD BY AUTOMATED COUNT: 13.4 % (ref 12.1–15.2)
GFR SERPL CREATININE-BSD FRML MDRD: >60 ML/MIN/1.73M2
GLUCOSE SERPL-MCNC: 92 MG/DL (ref 70–99)
HCT VFR BLD AUTO: 39.8 % (ref 41–53)
HGB BLD-MCNC: 12.5 G/DL (ref 13.5–17.5)
IMM GRANULOCYTES # BLD AUTO: 0.01 K/UL (ref 0–0.3)
IMM GRANULOCYTES NFR BLD: 0 % (ref 0–5)
LYMPHOCYTES NFR BLD: 1.25 K/UL (ref 1–4.8)
LYMPHOCYTES RELATIVE PERCENT: 18 % (ref 13–44)
MAGNESIUM SERPL-MCNC: 1.7 MG/DL (ref 1.6–2.6)
MCH RBC QN AUTO: 28.2 PG (ref 26–34)
MCHC RBC AUTO-ENTMCNC: 31.4 G/DL (ref 31–37)
MCV RBC AUTO: 89.8 FL (ref 80–100)
MONOCYTES NFR BLD: 0.67 K/UL (ref 0–1)
MONOCYTES NFR BLD: 10 % (ref 5–9)
NEUTROPHILS NFR BLD: 70 % (ref 39–75)
NEUTS SEG NFR BLD: 4.71 K/UL (ref 2.1–6.5)
PLATELET # BLD AUTO: 196 K/UL (ref 140–450)
PMV BLD AUTO: 9 FL (ref 6–12)
POTASSIUM SERPL-SCNC: 4.8 MMOL/L (ref 3.7–5.3)
PROT SERPL-MCNC: 6.9 G/DL (ref 6.4–8.3)
RBC # BLD AUTO: 4.43 M/UL (ref 4.5–5.9)
SODIUM SERPL-SCNC: 138 MMOL/L (ref 135–144)
TSH SERPL DL<=0.05 MIU/L-ACNC: 1.27 UIU/ML (ref 0.3–5)
WBC OTHER # BLD: 6.8 K/UL (ref 3.5–11)

## 2024-03-13 PROCEDURE — 36415 COLL VENOUS BLD VENIPUNCTURE: CPT

## 2024-03-13 PROCEDURE — 84443 ASSAY THYROID STIM HORMONE: CPT

## 2024-03-13 PROCEDURE — 82306 VITAMIN D 25 HYDROXY: CPT

## 2024-03-13 PROCEDURE — 83735 ASSAY OF MAGNESIUM: CPT

## 2024-03-13 PROCEDURE — 85025 COMPLETE CBC W/AUTO DIFF WBC: CPT

## 2024-03-13 PROCEDURE — 80053 COMPREHEN METABOLIC PANEL: CPT

## 2024-03-13 PROCEDURE — 80061 LIPID PANEL: CPT

## 2024-03-14 ENCOUNTER — APPOINTMENT (OUTPATIENT)
Dept: PULMONOLOGY | Facility: CLINIC | Age: 74
End: 2024-03-14
Payer: OTHER GOVERNMENT

## 2024-03-14 LAB
25(OH)D3 SERPL-MCNC: 26.6 NG/ML (ref 30–100)
CHOLEST SERPL-MCNC: 151 MG/DL (ref 0–199)
CHOLESTEROL/HDL RATIO: 2
HDLC SERPL-MCNC: 74 MG/DL
LDLC SERPL CALC-MCNC: 64 MG/DL (ref 0–100)
TRIGL SERPL-MCNC: 64 MG/DL
VLDLC SERPL CALC-MCNC: 13 MG/DL

## 2024-03-17 LAB
EKG ATRIAL RATE: 63 BPM
EKG P AXIS: 13 DEGREES
EKG P-R INTERVAL: 146 MS
EKG Q-T INTERVAL: 374 MS
EKG QRS DURATION: 80 MS
EKG QTC CALCULATION (BAZETT): 382 MS
EKG R AXIS: 84 DEGREES
EKG T AXIS: 88 DEGREES
EKG VENTRICULAR RATE: 63 BPM

## 2024-03-18 ENCOUNTER — OFFICE VISIT (OUTPATIENT)
Dept: CARDIOLOGY CLINIC | Age: 74
End: 2024-03-18
Payer: MEDICARE

## 2024-03-18 VITALS
HEART RATE: 63 BPM | DIASTOLIC BLOOD PRESSURE: 60 MMHG | SYSTOLIC BLOOD PRESSURE: 120 MMHG | WEIGHT: 155 LBS | BODY MASS INDEX: 22.24 KG/M2 | OXYGEN SATURATION: 97 %

## 2024-03-18 DIAGNOSIS — E55.9 VITAMIN D DEFICIENCY DISEASE: ICD-10-CM

## 2024-03-18 DIAGNOSIS — I48.91 ATRIAL FIBRILLATION, UNSPECIFIED TYPE (HCC): Primary | ICD-10-CM

## 2024-03-18 DIAGNOSIS — I10 ESSENTIAL HYPERTENSION: ICD-10-CM

## 2024-03-18 DIAGNOSIS — E78.00 PURE HYPERCHOLESTEROLEMIA: ICD-10-CM

## 2024-03-18 PROCEDURE — 3078F DIAST BP <80 MM HG: CPT | Performed by: INTERNAL MEDICINE

## 2024-03-18 PROCEDURE — 99214 OFFICE O/P EST MOD 30 MIN: CPT | Performed by: INTERNAL MEDICINE

## 2024-03-18 PROCEDURE — G8420 CALC BMI NORM PARAMETERS: HCPCS | Performed by: INTERNAL MEDICINE

## 2024-03-18 PROCEDURE — 3074F SYST BP LT 130 MM HG: CPT | Performed by: INTERNAL MEDICINE

## 2024-03-18 PROCEDURE — 1123F ACP DISCUSS/DSCN MKR DOCD: CPT | Performed by: INTERNAL MEDICINE

## 2024-03-18 PROCEDURE — G8484 FLU IMMUNIZE NO ADMIN: HCPCS | Performed by: INTERNAL MEDICINE

## 2024-03-18 PROCEDURE — 4004F PT TOBACCO SCREEN RCVD TLK: CPT | Performed by: INTERNAL MEDICINE

## 2024-03-18 PROCEDURE — 3017F COLORECTAL CA SCREEN DOC REV: CPT | Performed by: INTERNAL MEDICINE

## 2024-03-18 PROCEDURE — G8428 CUR MEDS NOT DOCUMENT: HCPCS | Performed by: INTERNAL MEDICINE

## 2024-03-18 NOTE — PROGRESS NOTES
Ov Dr. Saxena for 6 month f/u   Forgot med/list  \"They should be on there\"   No chest pain   Occ can feel a fib     No changes    Follow up in one year

## 2024-03-29 NOTE — PROGRESS NOTES
Keron Saxena MD  [facility]  [facility]  [address] [place] [address]  [phone]        2024        Aamir John MD   [address] [place], [place] [id number]     RE:  RUDY MASTERS   :  1950    Dear Dr. John:    CHIEF COMPLAINT:    Paroxysmal atrial fibrillation.  Not anticoagulated by his choice.  Severe COPD, on oxygen .    HISTORY OF PRESENT ILLNESS:  I had the pleasure of seeing Rudy Wood and his wife, Asiya,  in our office on 2024.  He is a 73-year-old gentleman with a long history of paroxysmal atrial fibrillation.  I had placed him on amiodarone, but he continued to have episodes of atrial fibrillation; therefore, I stopped amiodarone and placed him on rate control.    He has never wanted to be anticoagulated because of a 3.9 x 3.5 cm infrarenal aortic aneurysm.  He does know the risks of not being anticoagulated.    He does have severe COPD and is on oxygen full-time.    He had a catheterization on 2015, that showed 30% to 40% disease in the right coronary, 30% left main trunk, unremarkable circumflex and LAD.  EF 60%.  Medical therapy recommended.    On 2017, he developed palpitations and an event recorder showed atrial fibrillation.  He saw a cardiologist at [facility] who recommended anticoagulation because of a score of 3, which he did not wish to do, and he saw me for a second opinion.  We had recommended Eliquis, and he did not wish to do so; therefore, I placed him on 200 mg daily of amiodarone.  However, he continued to have episodes of atrial fibrillation, and therefore, his amiodarone was stopped on 2022.    He was having significant edema because of his COPD and pulmonary hypertension which was controlled with Lasix and Aldactone; however, he developed gynecomastia, and we switched to Inspra.    He continues to have increasing shortness of breath and more episodes of

## 2024-07-26 ENCOUNTER — HOSPITAL ENCOUNTER (OUTPATIENT)
Dept: RADIOLOGY | Facility: CLINIC | Age: 74
Discharge: HOME | End: 2024-07-26
Payer: OTHER GOVERNMENT

## 2024-07-26 ENCOUNTER — OFFICE VISIT (OUTPATIENT)
Dept: PULMONOLOGY | Facility: CLINIC | Age: 74
End: 2024-07-26
Payer: OTHER GOVERNMENT

## 2024-07-26 VITALS
WEIGHT: 151 LBS | TEMPERATURE: 98.4 F | HEART RATE: 61 BPM | SYSTOLIC BLOOD PRESSURE: 139 MMHG | OXYGEN SATURATION: 97 % | DIASTOLIC BLOOD PRESSURE: 73 MMHG | BODY MASS INDEX: 21.62 KG/M2 | HEIGHT: 70 IN

## 2024-07-26 DIAGNOSIS — J42 ACUTE EXACERBATION OF CHRONIC BRONCHITIS (MULTI): ICD-10-CM

## 2024-07-26 DIAGNOSIS — J20.9 ACUTE EXACERBATION OF CHRONIC BRONCHITIS (MULTI): ICD-10-CM

## 2024-07-26 DIAGNOSIS — R06.02 SHORTNESS OF BREATH: Primary | ICD-10-CM

## 2024-07-26 DIAGNOSIS — J43.2 CENTRILOBULAR EMPHYSEMA (MULTI): ICD-10-CM

## 2024-07-26 DIAGNOSIS — R09.02 HYPOXEMIA: ICD-10-CM

## 2024-07-26 DIAGNOSIS — R91.8 MULTIPLE LUNG NODULES: ICD-10-CM

## 2024-07-26 PROCEDURE — 71046 X-RAY EXAM CHEST 2 VIEWS: CPT

## 2024-07-26 PROCEDURE — 71046 X-RAY EXAM CHEST 2 VIEWS: CPT | Performed by: RADIOLOGY

## 2024-07-26 PROCEDURE — 1159F MED LIST DOCD IN RCRD: CPT | Performed by: INTERNAL MEDICINE

## 2024-07-26 PROCEDURE — 99215 OFFICE O/P EST HI 40 MIN: CPT | Performed by: INTERNAL MEDICINE

## 2024-07-26 RX ORDER — LOSARTAN POTASSIUM 25 MG/1
1 TABLET ORAL DAILY
COMMUNITY

## 2024-07-26 RX ORDER — PREDNISONE 10 MG/1
TABLET ORAL
Qty: 30 TABLET | Refills: 0 | Status: SHIPPED | OUTPATIENT
Start: 2024-07-26 | End: 2024-08-25

## 2024-07-26 NOTE — PROGRESS NOTES
Subjective   Patient ID: Finesse Wiggins is a 73 y.o. male who presents for Shortness of Breath.  HPI  Patient was seen today in the office and was accompanied by his wife.  Patient's last visit was on 12/8/2023 and he was post to have a follow-up appointment in 2 months but did not make that appointment.  I also reviewed his scans and his last CT scan was on 5/8/2023 which showed  Calcified nodules measuring 5 mm.  Patient is currently using Breztri 2 and elations twice a day and albuterol inhaler 2 puffs 4 times daily as needed shortness of breath.  He reports to me that his breathing has gotten worse over the past month which I suspect is related more so to the weather and to his continued smoking.  His wife states that she has gotten him down to 1 cigarette/day.  Patient has a coarse cough which was not significantly productive today.  He reports that generally the sputum is brown or clear.  He reports having dyspnea with increased daily activity.  I did turn his POC down to #3 and his saturations were 95%.  Review of Systems  Patient denies having any fever, chills, rhinitis or sore throat.  Objective   Physical Exam  Pulmonary, decreased breath sounds in the lung fields bilaterally with some rhonchi and some expiratory wheezes.  Cardio, heart sounds are regular rate and rhythm.  Extremities, no pretibial edema, cyanosis or clubbing.  Patient was alert and oriented x 3.  Patient was not in respiratory distress while walking in the office today.  Assessment/Plan        Impressions:  1.  Exacerbation of chronic bronchitis.  2.  Emphysema.  3.  Lung nodules.  4.  Chronic smoker.  5.  Hypoxemia.  Recommendations:  1.  PA and lateral chest x-ray today especially in view of his chronic smoking history to rule out any new pathology.  2.  Put him on a steroid regimen over the next 12 days.  This starts at 40 mg a day and is taper down every 3 days.  I reminded him to take the pills with food and in the morning.  3.   Follow-up with the patient in 1 month.      This note was transcribed using the Dragon Dictation system.  There may be grammatical, punctuation, or verbiage errors that occur with voice recognition programs.    Mckay Goncalves DO 07/26/24 2:05 PM

## 2024-08-07 DIAGNOSIS — J41.0 SIMPLE CHRONIC BRONCHITIS (MULTI): ICD-10-CM

## 2024-08-21 ENCOUNTER — APPOINTMENT (OUTPATIENT)
Dept: GENERAL RADIOLOGY | Age: 74
End: 2024-08-21
Payer: OTHER GOVERNMENT

## 2024-08-21 ENCOUNTER — HOSPITAL ENCOUNTER (EMERGENCY)
Age: 74
Discharge: HOME | End: 2024-08-21
Attending: EMERGENCY MEDICINE
Payer: OTHER GOVERNMENT

## 2024-08-21 VITALS
RESPIRATION RATE: 18 BRPM | TEMPERATURE: 98.1 F | OXYGEN SATURATION: 97 % | SYSTOLIC BLOOD PRESSURE: 120 MMHG | DIASTOLIC BLOOD PRESSURE: 54 MMHG | BODY MASS INDEX: 21.39 KG/M2 | WEIGHT: 149.1 LBS | HEART RATE: 58 BPM

## 2024-08-21 DIAGNOSIS — J44.1 COPD EXACERBATION (HCC): Primary | ICD-10-CM

## 2024-08-21 LAB
ALBUMIN SERPL-MCNC: 4.1 G/DL (ref 3.5–5.2)
ALP SERPL-CCNC: 74 U/L (ref 40–129)
ALT SERPL-CCNC: 13 U/L (ref 5–41)
ANION GAP SERPL CALCULATED.3IONS-SCNC: 9 MMOL/L (ref 9–17)
AST SERPL-CCNC: 16 U/L
BASOPHILS # BLD: 0.02 K/UL (ref 0–0.2)
BASOPHILS NFR BLD: 0 % (ref 0–2)
BILIRUB SERPL-MCNC: 0.3 MG/DL (ref 0.3–1.2)
BUN SERPL-MCNC: 15 MG/DL (ref 8–23)
BUN/CREAT SERPL: 15 (ref 9–20)
CALCIUM SERPL-MCNC: 10.1 MG/DL (ref 8.6–10.4)
CHLORIDE SERPL-SCNC: 96 MMOL/L (ref 98–107)
CO2 SERPL-SCNC: 30 MMOL/L (ref 20–31)
CREAT SERPL-MCNC: 1 MG/DL (ref 0.7–1.2)
EOSINOPHIL # BLD: 0.04 K/UL (ref 0–0.4)
EOSINOPHILS RELATIVE PERCENT: 0 % (ref 0–5)
ERYTHROCYTE [DISTWIDTH] IN BLOOD BY AUTOMATED COUNT: 13.3 % (ref 12.1–15.2)
GFR, ESTIMATED: 78 ML/MIN/1.73M2
GLUCOSE SERPL-MCNC: 102 MG/DL (ref 70–99)
HCT VFR BLD AUTO: 37.7 % (ref 41–53)
HGB BLD-MCNC: 12.2 G/DL (ref 13.5–17.5)
IMM GRANULOCYTES # BLD AUTO: 0.02 K/UL (ref 0–0.3)
IMM GRANULOCYTES NFR BLD: 0 % (ref 0–5)
LYMPHOCYTES NFR BLD: 0.69 K/UL (ref 1–4.8)
LYMPHOCYTES RELATIVE PERCENT: 7 % (ref 13–44)
MCH RBC QN AUTO: 28.3 PG (ref 26–34)
MCHC RBC AUTO-ENTMCNC: 32.4 G/DL (ref 31–37)
MCV RBC AUTO: 87.5 FL (ref 80–100)
MONOCYTES NFR BLD: 0.54 K/UL (ref 0–1)
MONOCYTES NFR BLD: 6 % (ref 5–9)
NEUTROPHILS NFR BLD: 87 % (ref 39–75)
NEUTS SEG NFR BLD: 8.09 K/UL (ref 2.1–6.5)
PLATELET # BLD AUTO: 176 K/UL (ref 140–450)
PMV BLD AUTO: 8.9 FL (ref 6–12)
POTASSIUM SERPL-SCNC: 4 MMOL/L (ref 3.7–5.3)
PROT SERPL-MCNC: 7.2 G/DL (ref 6.4–8.3)
RBC # BLD AUTO: 4.31 M/UL (ref 4.5–5.9)
SODIUM SERPL-SCNC: 135 MMOL/L (ref 135–144)
TROPONIN I SERPL HS-MCNC: 9 NG/L (ref 0–22)
WBC OTHER # BLD: 9.4 K/UL (ref 3.5–11)

## 2024-08-21 PROCEDURE — 94664 DEMO&/EVAL PT USE INHALER: CPT

## 2024-08-21 PROCEDURE — 99285 EMERGENCY DEPT VISIT HI MDM: CPT

## 2024-08-21 PROCEDURE — 80053 COMPREHEN METABOLIC PANEL: CPT

## 2024-08-21 PROCEDURE — 85025 COMPLETE CBC W/AUTO DIFF WBC: CPT

## 2024-08-21 PROCEDURE — 93005 ELECTROCARDIOGRAM TRACING: CPT | Performed by: EMERGENCY MEDICINE

## 2024-08-21 PROCEDURE — 84484 ASSAY OF TROPONIN QUANT: CPT

## 2024-08-21 PROCEDURE — 6370000000 HC RX 637 (ALT 250 FOR IP): Performed by: EMERGENCY MEDICINE

## 2024-08-21 PROCEDURE — 36415 COLL VENOUS BLD VENIPUNCTURE: CPT

## 2024-08-21 PROCEDURE — 71045 X-RAY EXAM CHEST 1 VIEW: CPT

## 2024-08-21 RX ORDER — PREDNISONE 50 MG/1
50 TABLET ORAL DAILY
Qty: 7 TABLET | Refills: 0 | Status: SHIPPED | OUTPATIENT
Start: 2024-08-21 | End: 2024-08-28

## 2024-08-21 RX ORDER — IPRATROPIUM BROMIDE AND ALBUTEROL SULFATE 2.5; .5 MG/3ML; MG/3ML
1 SOLUTION RESPIRATORY (INHALATION) ONCE
Status: COMPLETED | OUTPATIENT
Start: 2024-08-21 | End: 2024-08-21

## 2024-08-21 RX ADMIN — PREDNISONE 50 MG: 20 TABLET ORAL at 12:17

## 2024-08-21 RX ADMIN — IPRATROPIUM BROMIDE AND ALBUTEROL SULFATE 1 DOSE: .5; 3 SOLUTION RESPIRATORY (INHALATION) at 12:20

## 2024-08-21 ASSESSMENT — LIFESTYLE VARIABLES
HOW OFTEN DO YOU HAVE A DRINK CONTAINING ALCOHOL: NEVER
HOW MANY STANDARD DRINKS CONTAINING ALCOHOL DO YOU HAVE ON A TYPICAL DAY: PATIENT DOES NOT DRINK

## 2024-08-21 NOTE — ED PROVIDER NOTES
EMERGENCY DEPARTMENT ENCOUNTER      CHIEF COMPLAINT    Chief Complaint   Patient presents with    Shortness of Breath     SOB for a few days       HPI    Dayday Lee is a 73 y.o. male who presents to the emergency room for evaluation of shortness of breath for the past 3 days with associated cough and wheezing.  He has a history of COPD is on home oxygen and continues to smoke.  He reports increasing quantity of sputum but has not changed in color.  No fevers or chills or other associated symptoms.      PAST MEDICAL HISTORY    Past Medical History:   Diagnosis Date    Abdominal aneurysm (HCC)     x2    Aortic aneurysm (HCC)     Atrial fibrillation (HCC)     Chicken pox     Chronic back pain     COPD (chronic obstructive pulmonary disease) (HCC)     Emphysema of lung (HCC)     Emphysema of lung (HCC)     Hyperlipidemia     Hypertension 2015    Osteoarthritis        SURGICAL HISTORY    Past Surgical History:   Procedure Laterality Date    CARDIAC CATHETERIZATION Left 08/05/15    Dr. Saxena[facility]--Mild 30% disease of the left main trunck, Mild plaque disease in the coronary arteries.  Normal LV function, ejection fraction of 60%    EYE SURGERY         CURRENT MEDICATIONS    Current Outpatient Rx   Medication Sig Dispense Refill    predniSONE (DELTASONE) 50 MG tablet Take 1 tablet by mouth daily for 7 days 7 tablet 0    dilTIAZem (CARDIZEM CD) 120 MG extended release capsule Take 1 capsule by mouth daily 30 capsule 11    losartan (COZAAR) 25 MG tablet Take 1 tablet by mouth daily      eplerenone (INSPRA) 25 MG tablet Take 0.5 tablets by mouth daily      Budeson-Glycopyrrol-Formoterol (BREZTRI AEROSPHERE IN) Inhale into the lungs      aspirin 81 MG EC tablet Take 1 tablet by mouth daily      dilTIAZem (CARDIZEM) 30 MG tablet Take 1 tablet by mouth Okay to take every 1/2 hour for up to 4 doses for AFIB if heart rate is over 150-AS NEEDED      digoxin (LANOXIN) 125 MCG tablet Take 1 tablet by mouth daily 30 tablet

## 2024-08-21 NOTE — DISCHARGE INSTRUCTIONS
Take steroids as prescribed.  Call your pulmonologist for follow-up.  Return to ER for worsening symptoms.

## 2024-08-22 LAB
EKG ATRIAL RATE: 61 BPM
EKG P AXIS: -4 DEGREES
EKG P-R INTERVAL: 138 MS
EKG Q-T INTERVAL: 364 MS
EKG QRS DURATION: 84 MS
EKG QTC CALCULATION (BAZETT): 366 MS
EKG R AXIS: 72 DEGREES
EKG T AXIS: 81 DEGREES
EKG VENTRICULAR RATE: 61 BPM

## 2024-08-26 ENCOUNTER — APPOINTMENT (OUTPATIENT)
Dept: PULMONOLOGY | Facility: CLINIC | Age: 74
End: 2024-08-26
Payer: OTHER GOVERNMENT

## 2024-08-26 VITALS
BODY MASS INDEX: 21.33 KG/M2 | TEMPERATURE: 98.4 F | HEART RATE: 66 BPM | WEIGHT: 149 LBS | SYSTOLIC BLOOD PRESSURE: 140 MMHG | DIASTOLIC BLOOD PRESSURE: 71 MMHG | HEIGHT: 70 IN | OXYGEN SATURATION: 96 %

## 2024-08-26 DIAGNOSIS — F17.200 CURRENT EVERY DAY SMOKER: ICD-10-CM

## 2024-08-26 DIAGNOSIS — J41.8 MIXED SIMPLE AND MUCOPURULENT CHRONIC BRONCHITIS (MULTI): Primary | ICD-10-CM

## 2024-08-26 DIAGNOSIS — R09.02 HYPOXEMIA: ICD-10-CM

## 2024-08-26 DIAGNOSIS — J43.2 CENTRILOBULAR EMPHYSEMA (MULTI): ICD-10-CM

## 2024-08-26 PROCEDURE — 1159F MED LIST DOCD IN RCRD: CPT | Performed by: INTERNAL MEDICINE

## 2024-08-26 PROCEDURE — 99214 OFFICE O/P EST MOD 30 MIN: CPT | Performed by: INTERNAL MEDICINE

## 2024-08-26 RX ORDER — AZITHROMYCIN 250 MG/1
TABLET, FILM COATED ORAL
Qty: 6 TABLET | Refills: 0 | OUTPATIENT
Start: 2024-08-26

## 2024-08-26 NOTE — PROGRESS NOTES
Subjective   Patient ID: Finesse Wiggins is a 73 y.o. male who presents for No chief complaint on file..  HPI  Patient was seen today in the office on a 1 month follow-up visit.  He reports that he was recently in the emergency room at a local hospital because of some increased shortness of breath.  He was given nebulized bronchodilators and placed on 7 days of 50 mg of prednisone a day.  This combination seems to have improved his breathing.  Patient is currently walking on his own with oxygen at OCD at #3.  I also reviewed with him his chest x-ray findings from 7/26/2024 which showed only evidence of granulomatous calcified changes and no significant other pathology.  Patient denies having a cough or sputum production at this time and no wheezing.  He does have shortness of breath with activity but is still able to ambulate on his own.  Review of Systems  Patient denies having any fever, chills, rhinitis or sore throat.  He is smoking.  Objective   Physical Exam  HEENT, no inflammation on examination of the oropharynx.  Pulmonary, decreased breath sounds in the lung fields but otherwise clear.  Cardio, heart sounds were regular rate and rhythm.  Extremities, no cyanosis, clubbing or pretibial edema.  Psych, the patient is alert and oriented x 3.  His current oxygen saturation was 96% on OCD at #3  Assessment/Plan        Impressions:  1.  Chronic bronchitis.  2.  Centrilobular emphysema.  3.  Chronic smoker.  4.  Hypoxemia.  Recommendations:  1.  I strongly recommended that he stay in the house this week with the heat and humidity and with the air conditioning on.  2.  Continue with present treatments.  This includes Breztri at 2 inhalations twice a day and albuterol inhaler at 2 puffs 4 times a day.  He can also continue with nebulized albuterol treatments 4 times daily as needed shortness of breath.  3.  Follow-up with the patient in 3 weeks.      This note was transcribed using the Dragon Dictation system.   There may be grammatical, punctuation, or verbiage errors that occur with voice recognition programs.    Mckay Goncalves DO 08/26/24 2:28 PM

## 2024-09-12 ENCOUNTER — TELEPHONE (OUTPATIENT)
Dept: PULMONOLOGY | Facility: CLINIC | Age: 74
End: 2024-09-12
Payer: OTHER GOVERNMENT

## 2024-09-12 NOTE — TELEPHONE ENCOUNTER
Called patient about rescheduling his appointment from 9/16 that he cancelled. He does not want to reschedule at this time.   Patient does have a history of cancelling his appointments.

## 2024-09-16 ENCOUNTER — APPOINTMENT (OUTPATIENT)
Dept: PULMONOLOGY | Facility: CLINIC | Age: 74
End: 2024-09-16
Payer: OTHER GOVERNMENT

## 2024-12-14 ENCOUNTER — HOSPITAL ENCOUNTER (EMERGENCY)
Age: 74
Discharge: HOME | End: 2024-12-14
Attending: EMERGENCY MEDICINE
Payer: OTHER GOVERNMENT

## 2024-12-14 VITALS
HEIGHT: 70 IN | OXYGEN SATURATION: 96 % | DIASTOLIC BLOOD PRESSURE: 53 MMHG | BODY MASS INDEX: 21.47 KG/M2 | WEIGHT: 150 LBS | TEMPERATURE: 98.2 F | RESPIRATION RATE: 20 BRPM | SYSTOLIC BLOOD PRESSURE: 105 MMHG | HEART RATE: 63 BPM

## 2024-12-14 DIAGNOSIS — J44.1 COPD EXACERBATION (HCC): Primary | ICD-10-CM

## 2024-12-14 PROCEDURE — 87804 INFLUENZA ASSAY W/OPTIC: CPT

## 2024-12-14 PROCEDURE — 87635 SARS-COV-2 COVID-19 AMP PRB: CPT

## 2024-12-14 PROCEDURE — 6370000000 HC RX 637 (ALT 250 FOR IP): Performed by: EMERGENCY MEDICINE

## 2024-12-14 PROCEDURE — 99284 EMERGENCY DEPT VISIT MOD MDM: CPT

## 2024-12-14 PROCEDURE — 94664 DEMO&/EVAL PT USE INHALER: CPT

## 2024-12-14 RX ORDER — IPRATROPIUM BROMIDE AND ALBUTEROL SULFATE 2.5; .5 MG/3ML; MG/3ML
1 SOLUTION RESPIRATORY (INHALATION) ONCE
Status: COMPLETED | OUTPATIENT
Start: 2024-12-14 | End: 2024-12-14

## 2024-12-14 RX ORDER — PREDNISONE 20 MG/1
40 TABLET ORAL ONCE
Status: COMPLETED | OUTPATIENT
Start: 2024-12-14 | End: 2024-12-14

## 2024-12-14 RX ORDER — DOXYCYCLINE 100 MG/1
100 CAPSULE ORAL 2 TIMES DAILY
Qty: 14 CAPSULE | Refills: 0 | Status: SHIPPED | OUTPATIENT
Start: 2024-12-14

## 2024-12-14 RX ORDER — DOXYCYCLINE HYCLATE 100 MG
100 TABLET ORAL ONCE
Status: COMPLETED | OUTPATIENT
Start: 2024-12-14 | End: 2024-12-14

## 2024-12-14 RX ORDER — PREDNISONE 20 MG/1
40 TABLET ORAL DAILY
Qty: 10 TABLET | Refills: 0 | Status: SHIPPED | OUTPATIENT
Start: 2024-12-14 | End: 2024-12-19

## 2024-12-14 RX ADMIN — IPRATROPIUM BROMIDE AND ALBUTEROL SULFATE 1 DOSE: .5; 3 SOLUTION RESPIRATORY (INHALATION) at 18:43

## 2024-12-14 RX ADMIN — PREDNISONE 40 MG: 20 TABLET ORAL at 19:07

## 2024-12-14 RX ADMIN — DOXYCYCLINE HYCLATE 100 MG: 100 TABLET, COATED ORAL at 19:07

## 2024-12-14 ASSESSMENT — PAIN - FUNCTIONAL ASSESSMENT: PAIN_FUNCTIONAL_ASSESSMENT: NONE - DENIES PAIN

## 2024-12-14 NOTE — ED PROVIDER NOTES
eMERGENCY dEPARTMENT eNCOUnter        CHIEF COMPLAINT    Chief Complaint   Patient presents with    Cough     Cough x 3 weeks. Coughing up thick mucus x 1 week. Hx of COPD. Wears o2.        HPI    Dayday Lee is a 73 y.o. male who presents to ED with cough and shortness of breath for the past 3 weeks.  Patient has history of COPD.  Patient is on oxygen by nasal cannula at 3 L/min  REVIEW OF SYSTEMS    All systems reviewed and positives are in the HPI      PAST MEDICAL HISTORY    Past Medical History:   Diagnosis Date    Abdominal aneurysm (HCC)     x2    Aortic aneurysm (HCC)     Atrial fibrillation (HCC)     Chicken pox     Chronic back pain     COPD (chronic obstructive pulmonary disease) (HCC)     DVT (deep venous thrombosis) (HCC)     Emphysema of lung (HCC)     Emphysema of lung (HCC)     Hyperlipidemia     Hypertension 2015    Osteoarthritis        SURGICAL HISTORY    Past Surgical History:   Procedure Laterality Date    CARDIAC CATHETERIZATION Left 08/05/15    Dr. Saxena-[facility]--Mild 30% disease of the left main trunck, Mild plaque disease in the coronary arteries.  Normal LV function, ejection fraction of 60%    EYE SURGERY         CURRENT MEDICATIONS    Current Outpatient Rx   Medication Sig Dispense Refill    rivaroxaban (XARELTO) 2.5 MG TABS tablet Take 1 tablet by mouth 2 times daily      doxycycline monohydrate (MONODOX) 100 MG capsule Take 1 capsule by mouth 2 times daily 14 capsule 0    predniSONE (DELTASONE) 20 MG tablet Take 2 tablets by mouth daily for 5 doses 10 tablet 0    dilTIAZem (CARDIZEM CD) 120 MG extended release capsule Take 1 capsule by mouth daily 30 capsule 11    losartan (COZAAR) 25 MG tablet Take 1 tablet by mouth daily      eplerenone (INSPRA) 25 MG tablet Take 0.5 tablets by mouth daily      Budeson-Glycopyrrol-Formoterol (BREZTRI AEROSPHERE IN) Inhale into the lungs      aspirin 81 MG EC tablet Take 1 tablet by mouth daily      dilTIAZem (CARDIZEM) 30 MG tablet Take 1

## 2025-03-04 ENCOUNTER — HOSPITAL ENCOUNTER (EMERGENCY)
Age: 75
Discharge: HOME | End: 2025-03-04
Attending: EMERGENCY MEDICINE
Payer: OTHER GOVERNMENT

## 2025-03-04 VITALS
SYSTOLIC BLOOD PRESSURE: 106 MMHG | RESPIRATION RATE: 21 BRPM | OXYGEN SATURATION: 98 % | HEART RATE: 68 BPM | DIASTOLIC BLOOD PRESSURE: 63 MMHG | TEMPERATURE: 98.5 F

## 2025-03-04 DIAGNOSIS — I48.0 PAROXYSMAL ATRIAL FIBRILLATION (HCC): Primary | ICD-10-CM

## 2025-03-04 LAB
ANION GAP SERPL CALCULATED.3IONS-SCNC: 9 MMOL/L (ref 9–17)
BUN SERPL-MCNC: 15 MG/DL (ref 8–23)
CALCIUM SERPL-MCNC: 9.6 MG/DL (ref 8.6–10.4)
CHLORIDE SERPL-SCNC: 99 MMOL/L (ref 98–107)
CO2 SERPL-SCNC: 32 MMOL/L (ref 20–31)
CREAT SERPL-MCNC: 1 MG/DL (ref 0.7–1.2)
ERYTHROCYTE [DISTWIDTH] IN BLOOD BY AUTOMATED COUNT: 13.6 % (ref 12.1–15.2)
GFR, ESTIMATED: 78 ML/MIN/1.73M2
GLUCOSE SERPL-MCNC: 94 MG/DL (ref 70–99)
HCT VFR BLD AUTO: 42 % (ref 41–53)
HGB BLD-MCNC: 13.1 G/DL (ref 13.5–17.5)
MCH RBC QN AUTO: 27.5 PG (ref 26–34)
MCHC RBC AUTO-ENTMCNC: 31.2 G/DL (ref 31–37)
MCV RBC AUTO: 88.2 FL (ref 80–100)
PLATELET # BLD AUTO: 192 K/UL (ref 140–450)
PMV BLD AUTO: 9.6 FL (ref 6–12)
POTASSIUM SERPL-SCNC: 4.5 MMOL/L (ref 3.7–5.3)
RBC # BLD AUTO: 4.76 M/UL (ref 4.5–5.9)
SODIUM SERPL-SCNC: 140 MMOL/L (ref 135–144)
TROPONIN I SERPL HS-MCNC: 11 NG/L (ref 0–22)
TSH SERPL DL<=0.05 MIU/L-ACNC: 2 UIU/ML (ref 0.27–4.2)
WBC OTHER # BLD: 8.8 K/UL (ref 3.5–11)

## 2025-03-04 PROCEDURE — 85027 COMPLETE CBC AUTOMATED: CPT

## 2025-03-04 PROCEDURE — 2500000003 HC RX 250 WO HCPCS: Performed by: EMERGENCY MEDICINE

## 2025-03-04 PROCEDURE — 6370000000 HC RX 637 (ALT 250 FOR IP): Performed by: EMERGENCY MEDICINE

## 2025-03-04 PROCEDURE — 84443 ASSAY THYROID STIM HORMONE: CPT

## 2025-03-04 PROCEDURE — 80048 BASIC METABOLIC PNL TOTAL CA: CPT

## 2025-03-04 PROCEDURE — 84484 ASSAY OF TROPONIN QUANT: CPT

## 2025-03-04 PROCEDURE — 93005 ELECTROCARDIOGRAM TRACING: CPT | Performed by: EMERGENCY MEDICINE

## 2025-03-04 PROCEDURE — 99284 EMERGENCY DEPT VISIT MOD MDM: CPT

## 2025-03-04 PROCEDURE — 96374 THER/PROPH/DIAG INJ IV PUSH: CPT

## 2025-03-04 RX ORDER — DILTIAZEM HYDROCHLORIDE 30 MG/1
30 TABLET, FILM COATED ORAL ONCE
Status: COMPLETED | OUTPATIENT
Start: 2025-03-04 | End: 2025-03-04

## 2025-03-04 RX ORDER — DILTIAZEM HYDROCHLORIDE 30 MG/1
30 TABLET, FILM COATED ORAL EVERY 6 HOURS SCHEDULED
Status: DISCONTINUED | OUTPATIENT
Start: 2025-03-04 | End: 2025-03-04

## 2025-03-04 RX ORDER — DILTIAZEM HYDROCHLORIDE 5 MG/ML
10 INJECTION INTRAVENOUS ONCE
Status: COMPLETED | OUTPATIENT
Start: 2025-03-04 | End: 2025-03-04

## 2025-03-04 RX ADMIN — DILTIAZEM HYDROCHLORIDE 10 MG: 5 INJECTION, SOLUTION INTRAVENOUS at 15:35

## 2025-03-04 RX ADMIN — DILTIAZEM HYDROCHLORIDE 30 MG: 30 TABLET, FILM COATED ORAL at 15:43

## 2025-03-04 ASSESSMENT — PAIN - FUNCTIONAL ASSESSMENT: PAIN_FUNCTIONAL_ASSESSMENT: NONE - DENIES PAIN

## 2025-03-04 ASSESSMENT — ENCOUNTER SYMPTOMS
ABDOMINAL PAIN: 0
SHORTNESS OF BREATH: 1
CHEST TIGHTNESS: 0

## 2025-03-04 NOTE — ED PROVIDER NOTES
[facility]  EMERGENCY DEPARTMENT ENCOUNTER      Pt Name: Dayday Lee  MRN: [id number]  Birthdate 1950  Date of evaluation: 3/4/2025  Provider: Robel Sheth MD    CHIEF COMPLAINT       Chief Complaint   Patient presents with    Irregular Heart Beat     Pt states \"I have been in A-Fib since 0900 this morning\"         HISTORY OF PRESENT ILLNESS      Dayday Lee is a 74 y.o. male who presents to the emergency department pt with rapid HR starting at 9am today. Noticed his afib was rapid hoped it would get better  but it did not. No fevers. He has copd is on 4L O2 and is stable no cp no new sob. He takes xarelto and has been on this for 2 months. No fevers.    He does still smoke      REVIEW OF SYSTEMS       Review of Systems   Constitutional:  Negative for fever.   HENT:  Negative for congestion.    Respiratory:  Positive for shortness of breath. Negative for chest tightness.    Cardiovascular:  Negative for chest pain, palpitations and leg swelling.   Gastrointestinal:  Negative for abdominal pain.         PAST MEDICAL HISTORY     Past Medical History:   Diagnosis Date    Abdominal aneurysm     x2    Aortic aneurysm     Atrial fibrillation (HCC)     Chicken pox     Chronic back pain     COPD (chronic obstructive pulmonary disease) (HCC)     DVT (deep venous thrombosis) (HCC)     Emphysema of lung (HCC)     Emphysema of lung (HCC)     Hyperlipidemia     Hypertension 2015    Osteoarthritis          SURGICAL HISTORY       Past Surgical History:   Procedure Laterality Date    CARDIAC CATHETERIZATION Left 08/05/15    Dr. Saxena-[facility]--Mild 30% disease of the left main trunck, Mild plaque disease in the coronary arteries.  Normal LV function, ejection fraction of 60%    EYE SURGERY           CURRENT MEDICATIONS       Current Discharge Medication List        CONTINUE these medications which have NOT CHANGED    Details   rivaroxaban (XARELTO) 2.5 MG TABS tablet Take 1 tablet by mouth 2 times daily

## 2025-03-05 NOTE — PROGRESS NOTES
[facility] calls in to ask about pt discharge disposition from ER. SW accessed chart and informed that he returned home from ER. Shantelle Mendoza, MSW, LSW 3/5/2025

## 2025-03-07 ENCOUNTER — TELEPHONE (OUTPATIENT)
Dept: CARDIOLOGY CLINIC | Age: 75
End: 2025-03-07

## 2025-03-07 LAB
EKG Q-T INTERVAL: 232 MS
EKG QRS DURATION: 76 MS
EKG QTC CALCULATION (BAZETT): 377 MS
EKG R AXIS: 93 DEGREES
EKG T AXIS: -66 DEGREES
EKG VENTRICULAR RATE: 159 BPM

## 2025-03-07 PROCEDURE — 93010 ELECTROCARDIOGRAM REPORT: CPT | Performed by: INTERNAL MEDICINE

## 2025-03-07 NOTE — TELEPHONE ENCOUNTER
Asiya, HIP[employer], called in. States pt was in the ER with a high hr. Yesterday and toady hr has been low - pt was not like himself.     Blood pressure today is 95/52, 76 pulse     Wants to know if she should give pt his digoxin, metoprolol and ditalizem.    Informed Asiya that I am not a Dr so I am not able to give her medical advice regarding his medications. Also advised that Dr will not be back in office until next week and to either follow up with patients primary care or to take him back to the ER. Asiya voiced understanding and is planning to call pts pcp.

## 2025-03-11 ENCOUNTER — TELEPHONE (OUTPATIENT)
Dept: CARDIOLOGY CLINIC | Age: 75
End: 2025-03-11

## 2025-03-11 DIAGNOSIS — E55.9 VITAMIN D DEFICIENCY DISEASE: ICD-10-CM

## 2025-03-11 DIAGNOSIS — E78.00 PURE HYPERCHOLESTEROLEMIA: ICD-10-CM

## 2025-03-11 DIAGNOSIS — I48.0 PAROXYSMAL ATRIAL FIBRILLATION (HCC): ICD-10-CM

## 2025-03-11 DIAGNOSIS — I48.91 ATRIAL FIBRILLATION, UNSPECIFIED TYPE (HCC): Primary | ICD-10-CM

## 2025-03-11 DIAGNOSIS — I10 ESSENTIAL HYPERTENSION: ICD-10-CM

## 2025-03-12 ENCOUNTER — HOSPITAL ENCOUNTER (OUTPATIENT)
Age: 75
Discharge: HOME | End: 2025-03-12
Payer: OTHER GOVERNMENT

## 2025-03-12 ENCOUNTER — HOSPITAL ENCOUNTER (OUTPATIENT)
Age: 75
Discharge: HOME | End: 2025-03-14
Payer: OTHER GOVERNMENT

## 2025-03-12 ENCOUNTER — HOSPITAL ENCOUNTER (OUTPATIENT)
Dept: GENERAL RADIOLOGY | Age: 75
Discharge: HOME | End: 2025-03-14
Payer: OTHER GOVERNMENT

## 2025-03-12 DIAGNOSIS — I48.91 ATRIAL FIBRILLATION, UNSPECIFIED TYPE (HCC): ICD-10-CM

## 2025-03-12 DIAGNOSIS — E55.9 VITAMIN D DEFICIENCY DISEASE: ICD-10-CM

## 2025-03-12 DIAGNOSIS — I10 ESSENTIAL HYPERTENSION: ICD-10-CM

## 2025-03-12 DIAGNOSIS — E78.00 PURE HYPERCHOLESTEROLEMIA: ICD-10-CM

## 2025-03-12 LAB
25(OH)D3 SERPL-MCNC: 19.6 NG/ML (ref 30–100)
ALBUMIN SERPL-MCNC: 4.2 G/DL (ref 3.5–5.2)
ALBUMIN/GLOB SERPL: 1.5 {RATIO} (ref 1–2.5)
ALP SERPL-CCNC: 69 U/L (ref 40–129)
ALT SERPL-CCNC: 15 U/L (ref 5–41)
ANION GAP SERPL CALCULATED.3IONS-SCNC: 3 MMOL/L (ref 9–17)
AST SERPL-CCNC: 21 U/L
BASOPHILS # BLD: 0.04 K/UL (ref 0–0.2)
BASOPHILS NFR BLD: 1 % (ref 0–2)
BILIRUB SERPL-MCNC: 0.3 MG/DL (ref 0.3–1.2)
BUN SERPL-MCNC: 11 MG/DL (ref 8–23)
CALCIUM SERPL-MCNC: 9.4 MG/DL (ref 8.6–10.4)
CHLORIDE SERPL-SCNC: 99 MMOL/L (ref 98–107)
CHOLEST SERPL-MCNC: 145 MG/DL (ref 0–199)
CHOLESTEROL/HDL RATIO: 1.9
CO2 SERPL-SCNC: 37 MMOL/L (ref 20–31)
CREAT SERPL-MCNC: 0.9 MG/DL (ref 0.7–1.2)
EKG Q-T INTERVAL: 338 MS
EKG QRS DURATION: 78 MS
EKG QTC CALCULATION (BAZETT): 417 MS
EKG R AXIS: 87 DEGREES
EKG T AXIS: -3 DEGREES
EKG VENTRICULAR RATE: 92 BPM
EOSINOPHIL # BLD: 0.18 K/UL (ref 0–0.4)
EOSINOPHILS RELATIVE PERCENT: 3 % (ref 0–5)
ERYTHROCYTE [DISTWIDTH] IN BLOOD BY AUTOMATED COUNT: 13.4 % (ref 12.1–15.2)
GFR, ESTIMATED: 89 ML/MIN/1.73M2
GLUCOSE SERPL-MCNC: 97 MG/DL (ref 70–99)
HCT VFR BLD AUTO: 38.8 % (ref 41–53)
HDLC SERPL-MCNC: 76 MG/DL
HGB BLD-MCNC: 12 G/DL (ref 13.5–17.5)
IMM GRANULOCYTES # BLD AUTO: 0.01 K/UL (ref 0–0.3)
IMM GRANULOCYTES NFR BLD: 0 % (ref 0–5)
LDLC SERPL CALC-MCNC: 18 MG/DL (ref 0–100)
LYMPHOCYTES NFR BLD: 1.26 K/UL (ref 1–4.8)
LYMPHOCYTES RELATIVE PERCENT: 19 % (ref 13–44)
MAGNESIUM SERPL-MCNC: 1.9 MG/DL (ref 1.6–2.6)
MCH RBC QN AUTO: 27.4 PG (ref 26–34)
MCHC RBC AUTO-ENTMCNC: 30.9 G/DL (ref 31–37)
MCV RBC AUTO: 88.6 FL (ref 80–100)
MONOCYTES NFR BLD: 0.52 K/UL (ref 0–1)
MONOCYTES NFR BLD: 8 % (ref 5–9)
NEUTROPHILS NFR BLD: 69 % (ref 39–75)
NEUTS SEG NFR BLD: 4.63 K/UL (ref 2.1–6.5)
PLATELET # BLD AUTO: 170 K/UL (ref 140–450)
PMV BLD AUTO: 9.2 FL (ref 6–12)
POTASSIUM SERPL-SCNC: 4.4 MMOL/L (ref 3.7–5.3)
PROT SERPL-MCNC: 7 G/DL (ref 6.4–8.3)
RBC # BLD AUTO: 4.38 M/UL (ref 4.5–5.9)
SODIUM SERPL-SCNC: 139 MMOL/L (ref 135–144)
TRIGL SERPL-MCNC: 255 MG/DL
TSH SERPL DL<=0.05 MIU/L-ACNC: 1.77 UIU/ML (ref 0.27–4.2)
VLDLC SERPL CALC-MCNC: 51 MG/DL (ref 1–30)
WBC OTHER # BLD: 6.6 K/UL (ref 3.5–11)

## 2025-03-12 PROCEDURE — 83735 ASSAY OF MAGNESIUM: CPT

## 2025-03-12 PROCEDURE — 80061 LIPID PANEL: CPT

## 2025-03-12 PROCEDURE — 84443 ASSAY THYROID STIM HORMONE: CPT

## 2025-03-12 PROCEDURE — 71046 X-RAY EXAM CHEST 2 VIEWS: CPT

## 2025-03-12 PROCEDURE — 36415 COLL VENOUS BLD VENIPUNCTURE: CPT

## 2025-03-12 PROCEDURE — 80053 COMPREHEN METABOLIC PANEL: CPT

## 2025-03-12 PROCEDURE — 93005 ELECTROCARDIOGRAM TRACING: CPT

## 2025-03-12 PROCEDURE — 82306 VITAMIN D 25 HYDROXY: CPT

## 2025-03-12 PROCEDURE — 85025 COMPLETE CBC W/AUTO DIFF WBC: CPT

## 2025-03-17 ENCOUNTER — OFFICE VISIT (OUTPATIENT)
Dept: CARDIOLOGY CLINIC | Age: 75
End: 2025-03-17

## 2025-03-17 ENCOUNTER — HOSPITAL ENCOUNTER (OUTPATIENT)
Age: 75
Discharge: HOME | End: 2025-03-17
Payer: MEDICARE

## 2025-03-17 VITALS
DIASTOLIC BLOOD PRESSURE: 62 MMHG | BODY MASS INDEX: 22.53 KG/M2 | HEART RATE: 76 BPM | OXYGEN SATURATION: 91 % | SYSTOLIC BLOOD PRESSURE: 146 MMHG | WEIGHT: 157 LBS

## 2025-03-17 DIAGNOSIS — E55.9 VITAMIN D DEFICIENCY DISEASE: ICD-10-CM

## 2025-03-17 DIAGNOSIS — I10 ESSENTIAL HYPERTENSION: ICD-10-CM

## 2025-03-17 DIAGNOSIS — I48.91 ATRIAL FIBRILLATION, UNSPECIFIED TYPE (HCC): Primary | ICD-10-CM

## 2025-03-17 DIAGNOSIS — I48.0 PAROXYSMAL ATRIAL FIBRILLATION (HCC): ICD-10-CM

## 2025-03-17 DIAGNOSIS — E78.00 PURE HYPERCHOLESTEROLEMIA: ICD-10-CM

## 2025-03-17 DIAGNOSIS — I48.91 ATRIAL FIBRILLATION, UNSPECIFIED TYPE (HCC): ICD-10-CM

## 2025-03-17 LAB
DATE LAST DOSE: NORMAL
DIGOXIN DOSE TIME: 730
DIGOXIN DOSE: NORMAL MG
DIGOXIN SERPL-MCNC: 1 NG/ML (ref 0.5–2)

## 2025-03-17 PROCEDURE — 36415 COLL VENOUS BLD VENIPUNCTURE: CPT

## 2025-03-17 PROCEDURE — 80162 ASSAY OF DIGOXIN TOTAL: CPT

## 2025-03-17 RX ORDER — DILTIAZEM HYDROCHLORIDE 30 MG/1
30 TABLET, FILM COATED ORAL 4 TIMES DAILY
Qty: 120 TABLET | Refills: 1 | Status: SHIPPED | OUTPATIENT
Start: 2025-03-17

## 2025-03-17 NOTE — PROGRESS NOTES
Ov Sonali Garcia CNP 1 year follow up   No chest pain  Sob up and down.   On oxygen 2.5 L   Occ dizziness.   Some edema   In ED 3/4 a fib    Will do dig level.    See in 1 year

## 2025-04-19 ENCOUNTER — HOSPITAL ENCOUNTER (EMERGENCY)
Age: 75
Discharge: HOME | End: 2025-04-19
Payer: OTHER GOVERNMENT

## 2025-04-19 ENCOUNTER — APPOINTMENT (OUTPATIENT)
Dept: GENERAL RADIOLOGY | Age: 75
End: 2025-04-19
Payer: OTHER GOVERNMENT

## 2025-04-19 VITALS
HEIGHT: 70 IN | TEMPERATURE: 97.1 F | OXYGEN SATURATION: 95 % | WEIGHT: 155.7 LBS | RESPIRATION RATE: 23 BRPM | HEART RATE: 65 BPM | BODY MASS INDEX: 22.29 KG/M2 | SYSTOLIC BLOOD PRESSURE: 119 MMHG | DIASTOLIC BLOOD PRESSURE: 63 MMHG

## 2025-04-19 DIAGNOSIS — J44.1 COPD EXACERBATION (HCC): Primary | ICD-10-CM

## 2025-04-19 LAB
ANION GAP SERPL CALCULATED.3IONS-SCNC: 9 MMOL/L (ref 9–17)
BASOPHILS # BLD: 0.03 K/UL (ref 0–0.2)
BASOPHILS NFR BLD: 0 % (ref 0–2)
BNP SERPL-MCNC: 358 PG/ML
BUN SERPL-MCNC: 14 MG/DL (ref 8–23)
CALCIUM SERPL-MCNC: 9.4 MG/DL (ref 8.6–10.4)
CHLORIDE SERPL-SCNC: 98 MMOL/L (ref 98–107)
CO2 SERPL-SCNC: 33 MMOL/L (ref 20–31)
CREAT SERPL-MCNC: 0.9 MG/DL (ref 0.7–1.2)
EKG ATRIAL RATE: 69 BPM
EKG P AXIS: 86 DEGREES
EKG P-R INTERVAL: 148 MS
EKG Q-T INTERVAL: 370 MS
EKG QRS DURATION: 80 MS
EKG QTC CALCULATION (BAZETT): 396 MS
EKG R AXIS: 84 DEGREES
EKG T AXIS: 76 DEGREES
EKG VENTRICULAR RATE: 69 BPM
EOSINOPHIL # BLD: 0.15 K/UL (ref 0–0.4)
EOSINOPHILS RELATIVE PERCENT: 2 % (ref 0–5)
ERYTHROCYTE [DISTWIDTH] IN BLOOD BY AUTOMATED COUNT: 13.8 % (ref 12.1–15.2)
FLUAV AG SPEC QL: NEGATIVE
FLUBV AG SPEC QL: NEGATIVE
GFR, ESTIMATED: 89 ML/MIN/1.73M2
GLUCOSE SERPL-MCNC: 129 MG/DL (ref 70–99)
HCT VFR BLD AUTO: 40 % (ref 41–53)
HGB BLD-MCNC: 12.2 G/DL (ref 13.5–17.5)
IMM GRANULOCYTES # BLD AUTO: 0.01 K/UL (ref 0–0.3)
IMM GRANULOCYTES NFR BLD: 0 % (ref 0–5)
INR PPP: 1.4
LYMPHOCYTES NFR BLD: 0.9 K/UL (ref 1–4.8)
LYMPHOCYTES RELATIVE PERCENT: 12 % (ref 13–44)
MAGNESIUM SERPL-MCNC: 2 MG/DL (ref 1.6–2.6)
MCH RBC QN AUTO: 27.1 PG (ref 26–34)
MCHC RBC AUTO-ENTMCNC: 30.5 G/DL (ref 31–37)
MCV RBC AUTO: 88.7 FL (ref 80–100)
MONOCYTES NFR BLD: 0.74 K/UL (ref 0–1)
MONOCYTES NFR BLD: 10 % (ref 5–9)
NEUTROPHILS NFR BLD: 76 % (ref 39–75)
NEUTS SEG NFR BLD: 5.86 K/UL (ref 2.1–6.5)
PARTIAL THROMBOPLASTIN TIME: 48.5 SEC (ref 23.9–33.8)
PLATELET # BLD AUTO: 205 K/UL (ref 140–450)
PMV BLD AUTO: 9.4 FL (ref 6–12)
POTASSIUM SERPL-SCNC: 4.3 MMOL/L (ref 3.7–5.3)
PROTHROMBIN TIME: 17.1 SEC (ref 11.5–14.2)
RBC # BLD AUTO: 4.51 M/UL (ref 4.5–5.9)
SARS-COV-2 RDRP RESP QL NAA+PROBE: NOT DETECTED
SODIUM SERPL-SCNC: 140 MMOL/L (ref 135–144)
SPECIMEN DESCRIPTION: NORMAL
TROPONIN I SERPL HS-MCNC: 11 NG/L (ref 0–22)
TROPONIN I SERPL HS-MCNC: 13 NG/L (ref 0–22)
WBC OTHER # BLD: 7.7 K/UL (ref 3.5–11)

## 2025-04-19 PROCEDURE — 96375 TX/PRO/DX INJ NEW DRUG ADDON: CPT

## 2025-04-19 PROCEDURE — 71046 X-RAY EXAM CHEST 2 VIEWS: CPT

## 2025-04-19 PROCEDURE — 85610 PROTHROMBIN TIME: CPT

## 2025-04-19 PROCEDURE — 87804 INFLUENZA ASSAY W/OPTIC: CPT

## 2025-04-19 PROCEDURE — 6360000002 HC RX W HCPCS

## 2025-04-19 PROCEDURE — 2500000003 HC RX 250 WO HCPCS

## 2025-04-19 PROCEDURE — 94664 DEMO&/EVAL PT USE INHALER: CPT

## 2025-04-19 PROCEDURE — 85025 COMPLETE CBC W/AUTO DIFF WBC: CPT

## 2025-04-19 PROCEDURE — 36415 COLL VENOUS BLD VENIPUNCTURE: CPT

## 2025-04-19 PROCEDURE — 93005 ELECTROCARDIOGRAM TRACING: CPT

## 2025-04-19 PROCEDURE — 96365 THER/PROPH/DIAG IV INF INIT: CPT

## 2025-04-19 PROCEDURE — 85730 THROMBOPLASTIN TIME PARTIAL: CPT

## 2025-04-19 PROCEDURE — 83735 ASSAY OF MAGNESIUM: CPT

## 2025-04-19 PROCEDURE — 80048 BASIC METABOLIC PNL TOTAL CA: CPT

## 2025-04-19 PROCEDURE — 84484 ASSAY OF TROPONIN QUANT: CPT

## 2025-04-19 PROCEDURE — 87635 SARS-COV-2 COVID-19 AMP PRB: CPT

## 2025-04-19 PROCEDURE — 99285 EMERGENCY DEPT VISIT HI MDM: CPT

## 2025-04-19 PROCEDURE — 83880 ASSAY OF NATRIURETIC PEPTIDE: CPT

## 2025-04-19 PROCEDURE — 2580000003 HC RX 258

## 2025-04-19 PROCEDURE — 6370000000 HC RX 637 (ALT 250 FOR IP)

## 2025-04-19 RX ORDER — IPRATROPIUM BROMIDE AND ALBUTEROL SULFATE 2.5; .5 MG/3ML; MG/3ML
1 SOLUTION RESPIRATORY (INHALATION) 2 TIMES DAILY
COMMUNITY
Start: 2024-11-21

## 2025-04-19 RX ORDER — IPRATROPIUM BROMIDE AND ALBUTEROL SULFATE 2.5; .5 MG/3ML; MG/3ML
SOLUTION RESPIRATORY (INHALATION)
Status: COMPLETED
Start: 2025-04-19 | End: 2025-04-19

## 2025-04-19 RX ORDER — PREDNISONE 20 MG/1
40 TABLET ORAL DAILY
Qty: 20 TABLET | Refills: 0 | Status: SHIPPED | OUTPATIENT
Start: 2025-04-19 | End: 2025-04-29

## 2025-04-19 RX ORDER — IPRATROPIUM BROMIDE AND ALBUTEROL SULFATE 2.5; .5 MG/3ML; MG/3ML
1 SOLUTION RESPIRATORY (INHALATION)
Status: DISCONTINUED | OUTPATIENT
Start: 2025-04-19 | End: 2025-04-19 | Stop reason: HOSPADM

## 2025-04-19 RX ORDER — AZITHROMYCIN 250 MG/1
250 TABLET, FILM COATED ORAL DAILY
Qty: 5 TABLET | Refills: 0 | Status: SHIPPED | OUTPATIENT
Start: 2025-04-19 | End: 2025-04-24

## 2025-04-19 RX ADMIN — IPRATROPIUM BROMIDE AND ALBUTEROL SULFATE 1 DOSE: 2.5; .5 SOLUTION RESPIRATORY (INHALATION) at 13:42

## 2025-04-19 RX ADMIN — AZITHROMYCIN MONOHYDRATE 500 MG: 500 INJECTION, POWDER, LYOPHILIZED, FOR SOLUTION INTRAVENOUS at 14:41

## 2025-04-19 RX ADMIN — WATER 125 MG: 1 INJECTION INTRAMUSCULAR; INTRAVENOUS; SUBCUTANEOUS at 13:37

## 2025-04-19 RX ADMIN — IPRATROPIUM BROMIDE AND ALBUTEROL SULFATE 1 DOSE: .5; 3 SOLUTION RESPIRATORY (INHALATION) at 13:42

## 2025-04-19 ASSESSMENT — PAIN - FUNCTIONAL ASSESSMENT: PAIN_FUNCTIONAL_ASSESSMENT: NONE - DENIES PAIN

## 2025-04-19 NOTE — ED PROVIDER NOTES
[facility]  EMERGENCY DEPARTMENT ENCOUNTER      Pt Name: Dayday Lee  MRN: [id number]  Birthdate 1950  Date of evaluation: 4/19/2025  Provider: Jatin White MD    CHIEF COMPLAINT       Chief Complaint   Patient presents with    Shortness of Breath     Afib Thursday and Friday. Last two nights had increased sob, dyspnea, decrease in pulse ox. Wife states he has been going down hill for a while. Patient appears to have increase work of breathing, retractions noted. Patient wears 3L NC at home has been bumped up to 6L nc when ambulating.       HISTORY OF PRESENT ILLNESS      Dayday Lee is a 74 y.o., male ,  has a past medical history of Abdominal aneurysm, Aortic aneurysm, Atrial fibrillation ([facility]), Chicken pox, Chronic back pain, COPD (chronic obstructive pulmonary disease) ([facility]), DVT (deep venous thrombosis) ([facility]), DVT (deep venous thrombosis) ([facility]), Emphysema of lung (HCC), Emphysema of lung (HCC), Hyperlipidemia, Hypertension (2015), and Osteoarthritis. who was evaluated in the emergency room for Shortness of Breath, patient uses 3 L of supplemental oxygen at baseline, patient has been using his nebulizer treatment, inhalers but he feels like his shortness of breath has been progressively worsening.  He denies any fevers, chills, nausea, vomiting, chest pain.  Associated symptoms include cough productive of clear sputum.  Patient denies any known sick contacts. He is adamant that he does not want to be admitted.       REVIEW OF SYSTEMS       Review of system: Negative except for what is mentioned in the HPI.      PAST MEDICAL HISTORY     Past Medical History:   Diagnosis Date    Abdominal aneurysm     x2    Aortic aneurysm     Atrial fibrillation (HCC)     Chicken pox     Chronic back pain     COPD (chronic obstructive pulmonary disease) (HCC)     DVT (deep venous thrombosis) (HCC)     DVT (deep venous thrombosis) ([facility])     States he has cholesterol blockage in left leg 4/19/25    Emphysema of

## 2025-04-21 LAB
EKG ATRIAL RATE: 69 BPM
EKG P AXIS: 86 DEGREES
EKG P-R INTERVAL: 148 MS
EKG Q-T INTERVAL: 370 MS
EKG QRS DURATION: 80 MS
EKG QTC CALCULATION (BAZETT): 396 MS
EKG R AXIS: 84 DEGREES
EKG T AXIS: 76 DEGREES
EKG VENTRICULAR RATE: 69 BPM

## 2025-04-21 PROCEDURE — 93010 ELECTROCARDIOGRAM REPORT: CPT | Performed by: INTERNAL MEDICINE

## 2025-07-16 ENCOUNTER — TRANSCRIBE ORDERS (OUTPATIENT)
Dept: ADMINISTRATIVE | Age: 75
End: 2025-07-16

## 2025-07-16 DIAGNOSIS — I73.9 PVD (PERIPHERAL VASCULAR DISEASE): Primary | ICD-10-CM

## 2025-07-17 DIAGNOSIS — I10 ESSENTIAL HYPERTENSION: Primary | ICD-10-CM

## 2025-07-17 DIAGNOSIS — I48.0 PAROXYSMAL ATRIAL FIBRILLATION (HCC): ICD-10-CM

## 2025-07-17 DIAGNOSIS — E55.9 VITAMIN D DEFICIENCY DISEASE: ICD-10-CM

## 2025-07-17 DIAGNOSIS — I48.91 ATRIAL FIBRILLATION WITH RVR (HCC): ICD-10-CM

## 2025-07-18 ENCOUNTER — HOSPITAL ENCOUNTER (OUTPATIENT)
Age: 75
Discharge: HOME | End: 2025-07-18
Payer: OTHER GOVERNMENT

## 2025-07-18 ENCOUNTER — HOSPITAL ENCOUNTER (OUTPATIENT)
Dept: VASCULAR LAB | Age: 75
Discharge: HOME | End: 2025-07-20
Payer: OTHER GOVERNMENT

## 2025-07-18 ENCOUNTER — HOSPITAL ENCOUNTER (OUTPATIENT)
Dept: NON INVASIVE DIAGNOSTICS | Age: 75
Discharge: HOME | End: 2025-07-18
Payer: OTHER GOVERNMENT

## 2025-07-18 DIAGNOSIS — E78.00 PURE HYPERCHOLESTEROLEMIA: ICD-10-CM

## 2025-07-18 DIAGNOSIS — I73.9 PVD (PERIPHERAL VASCULAR DISEASE): ICD-10-CM

## 2025-07-18 DIAGNOSIS — I10 ESSENTIAL HYPERTENSION: ICD-10-CM

## 2025-07-18 DIAGNOSIS — I48.91 ATRIAL FIBRILLATION, UNSPECIFIED TYPE (HCC): ICD-10-CM

## 2025-07-18 DIAGNOSIS — E55.9 VITAMIN D DEFICIENCY DISEASE: ICD-10-CM

## 2025-07-18 LAB
25(OH)D3 SERPL-MCNC: 25.1 NG/ML (ref 30–100)
ALBUMIN SERPL-MCNC: 3.9 G/DL (ref 3.5–5.2)
ALBUMIN/GLOB SERPL: 1.4 {RATIO} (ref 1–2.5)
ALP SERPL-CCNC: 67 U/L (ref 40–129)
ALT SERPL-CCNC: 11 U/L (ref 5–41)
ANION GAP SERPL CALCULATED.3IONS-SCNC: 7 MMOL/L (ref 9–17)
AST SERPL-CCNC: 16 U/L
BASOPHILS # BLD: 0.03 K/UL (ref 0–0.2)
BASOPHILS NFR BLD: 0 % (ref 0–2)
BILIRUB SERPL-MCNC: 0.3 MG/DL (ref 0.3–1.2)
BUN SERPL-MCNC: 15 MG/DL (ref 8–23)
CALCIUM SERPL-MCNC: 9.2 MG/DL (ref 8.6–10.4)
CHLORIDE SERPL-SCNC: 104 MMOL/L (ref 98–107)
CHOLEST SERPL-MCNC: 129 MG/DL (ref 0–199)
CHOLESTEROL/HDL RATIO: 1.8
CO2 SERPL-SCNC: 32 MMOL/L (ref 20–31)
CREAT SERPL-MCNC: 0.9 MG/DL (ref 0.7–1.2)
EKG ATRIAL RATE: 60 BPM
EKG P-R INTERVAL: 140 MS
EKG Q-T INTERVAL: 378 MS
EKG QRS DURATION: 84 MS
EKG QTC CALCULATION (BAZETT): 378 MS
EKG R AXIS: 81 DEGREES
EKG T AXIS: 81 DEGREES
EKG VENTRICULAR RATE: 60 BPM
EOSINOPHIL # BLD: 0.12 K/UL (ref 0–0.4)
EOSINOPHILS RELATIVE PERCENT: 2 % (ref 0–5)
ERYTHROCYTE [DISTWIDTH] IN BLOOD BY AUTOMATED COUNT: 13.9 % (ref 12.1–15.2)
GFR, ESTIMATED: 89 ML/MIN/1.73M2
GLUCOSE SERPL-MCNC: 110 MG/DL (ref 70–99)
HCT VFR BLD AUTO: 36.2 % (ref 41–53)
HDLC SERPL-MCNC: 70 MG/DL
HGB BLD-MCNC: 11 G/DL (ref 13.5–17.5)
IMM GRANULOCYTES # BLD AUTO: 0.01 K/UL (ref 0–0.3)
IMM GRANULOCYTES NFR BLD: 0 % (ref 0–5)
LDLC SERPL CALC-MCNC: 47 MG/DL (ref 0–100)
LYMPHOCYTES NFR BLD: 0.92 K/UL (ref 1–4.8)
LYMPHOCYTES RELATIVE PERCENT: 12 % (ref 13–44)
MAGNESIUM SERPL-MCNC: 1.9 MG/DL (ref 1.6–2.6)
MCH RBC QN AUTO: 27.5 PG (ref 26–34)
MCHC RBC AUTO-ENTMCNC: 30.4 G/DL (ref 31–37)
MCV RBC AUTO: 90.5 FL (ref 80–100)
MONOCYTES NFR BLD: 0.74 K/UL (ref 0–1)
MONOCYTES NFR BLD: 10 % (ref 5–9)
NEUTROPHILS NFR BLD: 76 % (ref 39–75)
NEUTS SEG NFR BLD: 5.96 K/UL (ref 2.1–6.5)
PLATELET # BLD AUTO: 177 K/UL (ref 140–450)
PMV BLD AUTO: 9.1 FL (ref 6–12)
POTASSIUM SERPL-SCNC: 4.2 MMOL/L (ref 3.7–5.3)
PROT SERPL-MCNC: 6.7 G/DL (ref 6.4–8.3)
RBC # BLD AUTO: 4 M/UL (ref 4.5–5.9)
SODIUM SERPL-SCNC: 143 MMOL/L (ref 135–144)
TRIGL SERPL-MCNC: 58 MG/DL
TSH SERPL DL<=0.05 MIU/L-ACNC: 1.14 UIU/ML (ref 0.27–4.2)
VAS LEFT ABI: 0.56
VAS LEFT ARM BP: 163 MMHG
VAS LEFT ATA MID PSV: 24.9 CM/S
VAS LEFT CFA DIST PSV: 86.5 CM/S
VAS LEFT DORSALIS PEDIS BP: 79 MMHG
VAS LEFT PERONEAL MID PSV: 20.3 CM/S
VAS LEFT PFA PROX PSV: 59.1 CM/S
VAS LEFT POP A DIST PSV: 33.7 CM/S
VAS LEFT POP A PROX PSV: 29.1 CM/S
VAS LEFT POP A PROX VEL RATIO: 0.71
VAS LEFT PTA BP: 91 MMHG
VAS LEFT PTA MID PSV: 31.6 CM/S
VAS LEFT SFA DIST PSV: 41.1 CM/S
VAS LEFT SFA DIST VEL RATIO: 0.73
VAS LEFT SFA MID PSV: 56.5 CM/S
VAS LEFT SFA MID VEL RATIO: 0.7
VAS LEFT SFA PROX PSV: 80.4 CM/S
VAS LEFT SFA PROX VEL RATIO: 0.93
VAS RIGHT ABI: 0.93
VAS RIGHT ARM BP: 156 MMHG
VAS RIGHT ATA MID PSV: 83 CM/S
VAS RIGHT CFA DIST PSV: 140 CM/S
VAS RIGHT DORSALIS PEDIS BP: 152 MMHG
VAS RIGHT PERONEAL MID PSV: 79.4 CM/S
VAS RIGHT PFA PROX PSV: 138 CM/S
VAS RIGHT POP A DIST PSV: 81.3 CM/S
VAS RIGHT POP A PROX PSV: 69.5 CM/S
VAS RIGHT POP A PROX VEL RATIO: 0.67
VAS RIGHT PTA BP: 148 MMHG
VAS RIGHT PTA MID PSV: 101 CM/S
VAS RIGHT SFA DIST PSV: 103 CM/S
VAS RIGHT SFA DIST VEL RATIO: 1.15
VAS RIGHT SFA MID PSV: 89.7 CM/S
VAS RIGHT SFA MID VEL RATIO: 0.8
VAS RIGHT SFA PROX PSV: 114 CM/S
VAS RIGHT SFA PROX VEL RATIO: 0.8
VLDLC SERPL CALC-MCNC: 12 MG/DL (ref 1–30)
WBC OTHER # BLD: 7.8 K/UL (ref 3.5–11)

## 2025-07-18 PROCEDURE — 84443 ASSAY THYROID STIM HORMONE: CPT

## 2025-07-18 PROCEDURE — 80053 COMPREHEN METABOLIC PANEL: CPT

## 2025-07-18 PROCEDURE — 83735 ASSAY OF MAGNESIUM: CPT

## 2025-07-18 PROCEDURE — 36415 COLL VENOUS BLD VENIPUNCTURE: CPT

## 2025-07-18 PROCEDURE — 85025 COMPLETE CBC W/AUTO DIFF WBC: CPT

## 2025-07-18 PROCEDURE — 93922 UPR/L XTREMITY ART 2 LEVELS: CPT

## 2025-07-18 PROCEDURE — 93005 ELECTROCARDIOGRAM TRACING: CPT

## 2025-07-18 PROCEDURE — 80061 LIPID PANEL: CPT

## 2025-07-18 PROCEDURE — 93010 ELECTROCARDIOGRAM REPORT: CPT | Performed by: INTERNAL MEDICINE

## 2025-07-18 PROCEDURE — 82306 VITAMIN D 25 HYDROXY: CPT

## 2025-07-22 ENCOUNTER — OFFICE VISIT (OUTPATIENT)
Dept: CARDIOLOGY CLINIC | Age: 75
End: 2025-07-22
Payer: MEDICARE

## 2025-07-22 ENCOUNTER — HOSPITAL ENCOUNTER (OUTPATIENT)
Age: 75
Discharge: HOME | End: 2025-07-24
Payer: OTHER GOVERNMENT

## 2025-07-22 VITALS
DIASTOLIC BLOOD PRESSURE: 54 MMHG | BODY MASS INDEX: 22.1 KG/M2 | WEIGHT: 154 LBS | OXYGEN SATURATION: 84 % | SYSTOLIC BLOOD PRESSURE: 134 MMHG | HEART RATE: 67 BPM

## 2025-07-22 DIAGNOSIS — R07.9 CHEST PAIN, UNSPECIFIED TYPE: ICD-10-CM

## 2025-07-22 DIAGNOSIS — R06.02 SHORTNESS OF BREATH: ICD-10-CM

## 2025-07-22 DIAGNOSIS — I71.41 ANEURYSM OF SUPRARENAL AORTA: ICD-10-CM

## 2025-07-22 DIAGNOSIS — J44.9 CHRONIC OBSTRUCTIVE PULMONARY DISEASE, UNSPECIFIED COPD TYPE (HCC): ICD-10-CM

## 2025-07-22 DIAGNOSIS — I48.0 PAROXYSMAL ATRIAL FIBRILLATION (HCC): ICD-10-CM

## 2025-07-22 DIAGNOSIS — E78.00 PURE HYPERCHOLESTEROLEMIA: ICD-10-CM

## 2025-07-22 DIAGNOSIS — I48.0 PAROXYSMAL ATRIAL FIBRILLATION (HCC): Primary | ICD-10-CM

## 2025-07-22 DIAGNOSIS — I10 ESSENTIAL HYPERTENSION: ICD-10-CM

## 2025-07-22 PROCEDURE — 93270 REMOTE 30 DAY ECG REV/REPORT: CPT

## 2025-07-22 PROCEDURE — 3023F SPIROM DOC REV: CPT | Performed by: NURSE PRACTITIONER

## 2025-07-22 PROCEDURE — 4004F PT TOBACCO SCREEN RCVD TLK: CPT | Performed by: NURSE PRACTITIONER

## 2025-07-22 PROCEDURE — 1123F ACP DISCUSS/DSCN MKR DOCD: CPT | Performed by: NURSE PRACTITIONER

## 2025-07-22 PROCEDURE — 3075F SYST BP GE 130 - 139MM HG: CPT | Performed by: NURSE PRACTITIONER

## 2025-07-22 PROCEDURE — G8427 DOCREV CUR MEDS BY ELIG CLIN: HCPCS | Performed by: NURSE PRACTITIONER

## 2025-07-22 PROCEDURE — 99214 OFFICE O/P EST MOD 30 MIN: CPT | Performed by: NURSE PRACTITIONER

## 2025-07-22 PROCEDURE — G8420 CALC BMI NORM PARAMETERS: HCPCS | Performed by: NURSE PRACTITIONER

## 2025-07-22 PROCEDURE — 3078F DIAST BP <80 MM HG: CPT | Performed by: NURSE PRACTITIONER

## 2025-07-22 NOTE — PROGRESS NOTES
The patient was educated on the use of an event monitor. The patient's comprehension was medium. The patient was able to verbalize recall. The patient was instructed on how and when to return the monitor.

## 2025-07-22 NOTE — PATIENT INSTRUCTIONS
No change in medications.  Will order a 30 day cardiac event monitor.  Will order Lexiscan Stress Test  Will order an echocardiogram.  Will order CT chest, abdomen and pelvis with IV contrast.  Will do Referral to Vascular and Pulmonology.  Will follow up in 6 weeks to review heart monitor.

## 2025-07-24 ENCOUNTER — TELEPHONE (OUTPATIENT)
Dept: CARDIOLOGY CLINIC | Age: 75
End: 2025-07-24

## 2025-07-24 DIAGNOSIS — I48.0 PAROXYSMAL ATRIAL FIBRILLATION (HCC): Primary | ICD-10-CM

## 2025-07-24 DIAGNOSIS — R06.02 SHORTNESS OF BREATH: ICD-10-CM

## 2025-07-24 RX ORDER — DILTIAZEM HYDROCHLORIDE 180 MG/1
180 CAPSULE, COATED, EXTENDED RELEASE ORAL DAILY
Qty: 30 CAPSULE | Refills: 11 | Status: SHIPPED | OUTPATIENT
Start: 2025-07-24

## 2025-07-24 NOTE — TELEPHONE ENCOUNTER
Notified of cardiac event monitor showing atrial fib, rate 127 with symptom heart racing. Will stop Cardizem  mg and start Cardizem  mg daily. Will check BP and HR daily and record. Will continue to monitor. Advised to call if needed.

## 2025-07-29 ENCOUNTER — TELEPHONE (OUTPATIENT)
Dept: CARDIOLOGY CLINIC | Age: 75
End: 2025-07-29

## 2025-07-29 NOTE — TELEPHONE ENCOUNTER
Wife called 07/28/2025 stating Dayday was just not feeling right/sluggish wife is very concerned and thinks she might need to take him to ER. Wife states this has been  Since increase of Diltiazem   Sonali notified-agrees to ER.

## 2025-07-30 NOTE — PROGRESS NOTES
Pt in office for a-fib runs    Has been having a lot of a-fib lately and lot of tiredness  Lots of congestion   
dizziness but denies fainting spells.  He denies nosebleeds, blood in urine, blood in stool or unusual bruising.     Mr. Lee denies any chest pain now or in the recent past, chest discomfort or loss of energy.  No syncope or near syncope.  No problems with his medications or any other concerns at this time.      CARDIAC RISK FACTORS:   Known CAD:  Mildly positive.  Hypertension:  Positive.  Hyperlipidemia:  Positive.  Peripheral Vascular Disease:  Positive.  Other Family Members:  Positive.  Smoking:  Positive.  Diabetes:  Negative.    PAST MEDICAL HISTORY:        Past Medical History:   Diagnosis Date    Abdominal aneurysm     x2    Aortic aneurysm     Atrial fibrillation (HCC)     Chicken pox     Chronic back pain     COPD (chronic obstructive pulmonary disease) (HCC)     DVT (deep venous thrombosis) (HCC)     DVT (deep venous thrombosis) (HCC)     States he has cholesterol blockage in left leg 4/19/25    Emphysema of lung (HCC)     Emphysema of lung (HCC)     Hyperlipidemia     Hypertension 2015    Osteoarthritis        CURRENT ALLERGIES: Codeine, Atorvastatin, and Morphine REVIEW OF SYSTEMS: Cardiac as above.  Other systems reviewed including constitutional, eyes, ears, nose and throat, cardiovascular, respiratory, GI, , musculoskeletal, integumentary, neurologic, endocrine, hematologic and allergic/immunologic, are negative except for what is described above.  No weight loss or weight gain.  No change in bowel habits.  No blood in stools.  No fevers, sweats or chills.      Past Surgical History:   Procedure Laterality Date    CARDIAC CATHETERIZATION Left 08/05/15    Dr. Saxena-[facility]--Mild 30% disease of the left main trunck, Mild plaque disease in the coronary arteries.  Normal LV function, ejection fraction of 60%    EYE SURGERY           FAMILY HISTORY:  family history includes Cancer in his brother, father, and mother.      Social History     Tobacco Use    Smoking status: Every Day     Current

## 2025-07-31 ENCOUNTER — HOSPITAL ENCOUNTER (OUTPATIENT)
Dept: NUCLEAR MEDICINE | Age: 75
Discharge: HOME | End: 2025-08-02
Payer: OTHER GOVERNMENT

## 2025-07-31 ENCOUNTER — HOSPITAL ENCOUNTER (OUTPATIENT)
Dept: CT IMAGING | Age: 75
Discharge: HOME | End: 2025-08-02
Payer: OTHER GOVERNMENT

## 2025-07-31 ENCOUNTER — HOSPITAL ENCOUNTER (OUTPATIENT)
Age: 75
Discharge: HOME | End: 2025-08-02
Payer: OTHER GOVERNMENT

## 2025-07-31 VITALS — HEIGHT: 70 IN | WEIGHT: 154 LBS | BODY MASS INDEX: 22.05 KG/M2

## 2025-07-31 VITALS — HEART RATE: 68 BPM | DIASTOLIC BLOOD PRESSURE: 87 MMHG | SYSTOLIC BLOOD PRESSURE: 189 MMHG

## 2025-07-31 DIAGNOSIS — I10 ESSENTIAL HYPERTENSION: ICD-10-CM

## 2025-07-31 DIAGNOSIS — I48.0 PAROXYSMAL ATRIAL FIBRILLATION (HCC): ICD-10-CM

## 2025-07-31 DIAGNOSIS — R06.02 SHORTNESS OF BREATH: ICD-10-CM

## 2025-07-31 DIAGNOSIS — R07.9 CHEST PAIN, UNSPECIFIED TYPE: ICD-10-CM

## 2025-07-31 DIAGNOSIS — E78.00 PURE HYPERCHOLESTEROLEMIA: ICD-10-CM

## 2025-07-31 DIAGNOSIS — I71.41 ANEURYSM OF SUPRARENAL AORTA: ICD-10-CM

## 2025-07-31 DIAGNOSIS — J44.9 CHRONIC OBSTRUCTIVE PULMONARY DISEASE, UNSPECIFIED COPD TYPE (HCC): ICD-10-CM

## 2025-07-31 PROCEDURE — A9500 TC99M SESTAMIBI: HCPCS | Performed by: NURSE PRACTITIONER

## 2025-07-31 PROCEDURE — 93017 CV STRESS TEST TRACING ONLY: CPT

## 2025-07-31 PROCEDURE — 74177 CT ABD & PELVIS W/CONTRAST: CPT

## 2025-07-31 PROCEDURE — 93306 TTE W/DOPPLER COMPLETE: CPT

## 2025-07-31 PROCEDURE — 6360000002 HC RX W HCPCS: Performed by: INTERNAL MEDICINE

## 2025-07-31 PROCEDURE — 3430000000 HC RX DIAGNOSTIC RADIOPHARMACEUTICAL: Performed by: NURSE PRACTITIONER

## 2025-07-31 PROCEDURE — 6360000004 HC RX CONTRAST MEDICATION: Performed by: NURSE PRACTITIONER

## 2025-07-31 PROCEDURE — 78452 HT MUSCLE IMAGE SPECT MULT: CPT

## 2025-07-31 PROCEDURE — 2500000003 HC RX 250 WO HCPCS: Performed by: INTERNAL MEDICINE

## 2025-07-31 RX ORDER — IOPAMIDOL 755 MG/ML
75 INJECTION, SOLUTION INTRAVASCULAR
Status: COMPLETED | OUTPATIENT
Start: 2025-07-31 | End: 2025-07-31

## 2025-07-31 RX ORDER — REGADENOSON 0.08 MG/ML
0.4 INJECTION, SOLUTION INTRAVENOUS
Status: COMPLETED | OUTPATIENT
Start: 2025-07-31 | End: 2025-07-31

## 2025-07-31 RX ORDER — SODIUM CHLORIDE 0.9 % (FLUSH) 0.9 %
5-40 SYRINGE (ML) INJECTION PRN
Status: ACTIVE | OUTPATIENT
Start: 2025-07-31 | End: 2025-07-31

## 2025-07-31 RX ORDER — AMINOPHYLLINE 25 MG/ML
50 INJECTION, SOLUTION INTRAVENOUS PRN
Status: DISPENSED | OUTPATIENT
Start: 2025-07-31 | End: 2025-07-31

## 2025-07-31 RX ORDER — TETRAKIS(2-METHOXYISOBUTYLISOCYANIDE)COPPER(I) TETRAFLUOROBORATE 1 MG/ML
30 INJECTION, POWDER, LYOPHILIZED, FOR SOLUTION INTRAVENOUS
Status: COMPLETED | OUTPATIENT
Start: 2025-07-31 | End: 2025-07-31

## 2025-07-31 RX ORDER — TETRAKIS(2-METHOXYISOBUTYLISOCYANIDE)COPPER(I) TETRAFLUOROBORATE 1 MG/ML
10 INJECTION, POWDER, LYOPHILIZED, FOR SOLUTION INTRAVENOUS
Status: COMPLETED | OUTPATIENT
Start: 2025-07-31 | End: 2025-07-31

## 2025-07-31 RX ADMIN — TETRAKIS(2-METHOXYISOBUTYLISOCYANIDE)COPPER(I) TETRAFLUOROBORATE 10 MILLICURIE: 1 INJECTION, POWDER, LYOPHILIZED, FOR SOLUTION INTRAVENOUS at 10:49

## 2025-07-31 RX ADMIN — SODIUM CHLORIDE, PRESERVATIVE FREE 10 ML: 5 INJECTION INTRAVENOUS at 11:17

## 2025-07-31 RX ADMIN — REGADENOSON 0.4 MG: 0.08 INJECTION, SOLUTION INTRAVENOUS at 11:18

## 2025-07-31 RX ADMIN — IOPAMIDOL 75 ML: 755 INJECTION, SOLUTION INTRAVENOUS at 13:21

## 2025-07-31 RX ADMIN — TETRAKIS(2-METHOXYISOBUTYLISOCYANIDE)COPPER(I) TETRAFLUOROBORATE 30 MILLICURIE: 1 INJECTION, POWDER, LYOPHILIZED, FOR SOLUTION INTRAVENOUS at 10:49

## 2025-08-03 LAB
ECHO AO ASC DIAM: 2.9 CM
ECHO AO ASCENDING AORTA INDEX: 1.55 CM/M2
ECHO AO ROOT DIAM: 3.2 CM
ECHO AO ROOT INDEX: 1.71 CM/M2
ECHO AV AREA PEAK VELOCITY: 1.9 CM2
ECHO AV AREA VTI: 2.3 CM2
ECHO AV AREA/BSA PEAK VELOCITY: 1 CM2/M2
ECHO AV AREA/BSA VTI: 1.2 CM2/M2
ECHO AV MEAN GRADIENT: 7 MMHG
ECHO AV MEAN VELOCITY: 1.2 M/S
ECHO AV PEAK GRADIENT: 13 MMHG
ECHO AV PEAK VELOCITY: 1.8 M/S
ECHO AV VELOCITY RATIO: 0.67
ECHO AV VTI: 31.3 CM
ECHO BSA: 1.86 M2
ECHO EST RA PRESSURE: 8 MMHG
ECHO LA DIAMETER INDEX: 1.82 CM/M2
ECHO LA DIAMETER: 3.4 CM
ECHO LA TO AORTIC ROOT RATIO: 1.06
ECHO LA VOL A-L A2C: 50 ML (ref 18–58)
ECHO LA VOL A-L A4C: 48 ML (ref 18–58)
ECHO LA VOL BP: 49 ML (ref 18–58)
ECHO LA VOL MOD A2C: 49 ML (ref 18–58)
ECHO LA VOL MOD A4C: 44 ML (ref 18–58)
ECHO LA VOL/BSA BIPLANE: 26 ML/M2 (ref 16–34)
ECHO LA VOLUME AREA LENGTH: 53 ML
ECHO LA VOLUME INDEX A-L A2C: 27 ML/M2 (ref 16–34)
ECHO LA VOLUME INDEX A-L A4C: 26 ML/M2 (ref 16–34)
ECHO LA VOLUME INDEX AREA LENGTH: 28 ML/M2 (ref 16–34)
ECHO LA VOLUME INDEX MOD A2C: 26 ML/M2 (ref 16–34)
ECHO LA VOLUME INDEX MOD A4C: 24 ML/M2 (ref 16–34)
ECHO LV E' LATERAL VELOCITY: 11.99 CM/S
ECHO LV E' SEPTAL VELOCITY: 10.74 CM/S
ECHO LV EDV A2C: 55 ML
ECHO LV EDV A4C: 63 ML
ECHO LV EDV BP: 59 ML (ref 67–155)
ECHO LV EDV INDEX A4C: 34 ML/M2
ECHO LV EDV INDEX BP: 32 ML/M2
ECHO LV EDV NDEX A2C: 29 ML/M2
ECHO LV EF PHYSICIAN: 47 %
ECHO LV EJECTION FRACTION A2C: 40 %
ECHO LV EJECTION FRACTION A4C: 54 %
ECHO LV EJECTION FRACTION BIPLANE: 47 % (ref 55–100)
ECHO LV ESV A2C: 33 ML
ECHO LV ESV A4C: 29 ML
ECHO LV ESV BP: 31 ML (ref 22–58)
ECHO LV ESV INDEX A2C: 18 ML/M2
ECHO LV ESV INDEX A4C: 16 ML/M2
ECHO LV ESV INDEX BP: 17 ML/M2
ECHO LV FRACTIONAL SHORTENING: 27 % (ref 28–44)
ECHO LV INTERNAL DIMENSION DIASTOLE INDEX: 2.19 CM/M2
ECHO LV INTERNAL DIMENSION DIASTOLIC: 4.1 CM (ref 4.2–5.9)
ECHO LV INTERNAL DIMENSION SYSTOLIC INDEX: 1.6 CM/M2
ECHO LV INTERNAL DIMENSION SYSTOLIC: 3 CM
ECHO LV IVSD: 0.9 CM (ref 0.6–1)
ECHO LV MASS 2D: 105.6 G (ref 88–224)
ECHO LV MASS INDEX 2D: 56.5 G/M2 (ref 49–115)
ECHO LV POSTERIOR WALL DIASTOLIC: 0.8 CM (ref 0.6–1)
ECHO LV RELATIVE WALL THICKNESS RATIO: 0.39
ECHO LVOT AREA: 2.8 CM2
ECHO LVOT AV VTI INDEX: 0.83
ECHO LVOT DIAM: 1.9 CM
ECHO LVOT MEAN GRADIENT: 3 MMHG
ECHO LVOT PEAK GRADIENT: 6 MMHG
ECHO LVOT PEAK VELOCITY: 1.2 M/S
ECHO LVOT STROKE VOLUME INDEX: 39.4 ML/M2
ECHO LVOT SV: 73.7 ML
ECHO LVOT VTI: 26 CM
ECHO PV MAX VELOCITY: 1.1 M/S
ECHO PV PEAK GRADIENT: 5 MMHG
ECHO RA END SYSTOLIC VOLUME APICAL 4 CHAMBER INDEX BSA: 24 ML/M2
ECHO RA VOLUME: 44 ML
ECHO RIGHT VENTRICULAR SYSTOLIC PRESSURE (RVSP): 61 MMHG
ECHO RV BASAL DIMENSION: 3.4 CM
ECHO RV LONGITUDINAL DIMENSION: 8.1 CM
ECHO RV MID DIMENSION: 2.4 CM
ECHO RV TAPSE: 1.8 CM (ref 1.7–?)
ECHO TV REGURGITANT MAX VELOCITY: 3.64 M/S
ECHO TV REGURGITANT PEAK GRADIENT: 53 MMHG
NUC STRESS EJECTION FRACTION: 72 %
STRESS BASELINE DIAS BP: 79 MMHG
STRESS BASELINE HR: 61 BPM
STRESS BASELINE SYS BP: 167 MMHG
STRESS ESTIMATED WORKLOAD: 1 METS
STRESS PEAK DIAS BP: 88 MMHG
STRESS PEAK SYS BP: 194 MMHG
STRESS PERCENT HR ACHIEVED: 52 %
STRESS POST PEAK HR: 75 BPM
STRESS RATE PRESSURE PRODUCT: NORMAL BPM*MMHG
STRESS TARGET HR: 144 BPM

## 2025-08-04 ENCOUNTER — RESULTS FOLLOW-UP (OUTPATIENT)
Dept: CARDIOLOGY CLINIC | Age: 75
End: 2025-08-04

## 2025-08-07 ENCOUNTER — TELEPHONE (OUTPATIENT)
Dept: CARDIOLOGY CLINIC | Age: 75
End: 2025-08-07

## 2025-08-21 ENCOUNTER — OFFICE VISIT (OUTPATIENT)
Dept: CARDIOLOGY CLINIC | Age: 75
End: 2025-08-21
Payer: MEDICARE

## 2025-08-21 VITALS
RESPIRATION RATE: 18 BRPM | BODY MASS INDEX: 22.1 KG/M2 | OXYGEN SATURATION: 89 % | DIASTOLIC BLOOD PRESSURE: 58 MMHG | SYSTOLIC BLOOD PRESSURE: 134 MMHG | WEIGHT: 154 LBS | HEART RATE: 58 BPM

## 2025-08-21 DIAGNOSIS — R07.9 CHEST PAIN, UNSPECIFIED TYPE: ICD-10-CM

## 2025-08-21 DIAGNOSIS — I48.0 PAROXYSMAL ATRIAL FIBRILLATION (HCC): Primary | ICD-10-CM

## 2025-08-21 DIAGNOSIS — I71.41 ANEURYSM OF SUPRARENAL AORTA: ICD-10-CM

## 2025-08-21 DIAGNOSIS — I10 ESSENTIAL HYPERTENSION: ICD-10-CM

## 2025-08-21 DIAGNOSIS — R06.02 SHORTNESS OF BREATH: ICD-10-CM

## 2025-08-21 PROCEDURE — 3078F DIAST BP <80 MM HG: CPT | Performed by: NURSE PRACTITIONER

## 2025-08-21 PROCEDURE — G8427 DOCREV CUR MEDS BY ELIG CLIN: HCPCS | Performed by: NURSE PRACTITIONER

## 2025-08-21 PROCEDURE — 4004F PT TOBACCO SCREEN RCVD TLK: CPT | Performed by: NURSE PRACTITIONER

## 2025-08-21 PROCEDURE — 3074F SYST BP LT 130 MM HG: CPT | Performed by: NURSE PRACTITIONER

## 2025-08-21 PROCEDURE — G8420 CALC BMI NORM PARAMETERS: HCPCS | Performed by: NURSE PRACTITIONER

## 2025-08-21 PROCEDURE — 1123F ACP DISCUSS/DSCN MKR DOCD: CPT | Performed by: NURSE PRACTITIONER

## 2025-08-21 PROCEDURE — 99214 OFFICE O/P EST MOD 30 MIN: CPT | Performed by: NURSE PRACTITIONER

## 2025-08-23 PROBLEM — J44.9 COPD, SEVERE (MULTI): Status: RESOLVED | Noted: 2023-09-29 | Resolved: 2025-08-23

## 2025-08-23 PROBLEM — J42 CHRONIC BRONCHITIS (MULTI): Status: RESOLVED | Noted: 2023-09-29 | Resolved: 2025-08-23

## 2025-08-23 PROBLEM — I48.91 ATRIAL FIBRILLATION WITH RVR (MULTI): Status: ACTIVE | Noted: 2021-12-31

## 2025-08-25 ENCOUNTER — APPOINTMENT (OUTPATIENT)
Dept: CARDIOLOGY | Facility: CLINIC | Age: 75
End: 2025-08-25
Payer: OTHER GOVERNMENT

## 2025-08-25 VITALS
HEART RATE: 66 BPM | HEIGHT: 70 IN | SYSTOLIC BLOOD PRESSURE: 122 MMHG | OXYGEN SATURATION: 91 % | WEIGHT: 153 LBS | DIASTOLIC BLOOD PRESSURE: 60 MMHG | BODY MASS INDEX: 21.9 KG/M2

## 2025-08-25 DIAGNOSIS — I48.11 LONGSTANDING PERSISTENT ATRIAL FIBRILLATION (MULTI): ICD-10-CM

## 2025-08-25 DIAGNOSIS — I73.9 PAD (PERIPHERAL ARTERY DISEASE): Primary | ICD-10-CM

## 2025-08-25 DIAGNOSIS — I71.40 ABDOMINAL AORTIC ANEURYSM (AAA) WITHOUT RUPTURE, UNSPECIFIED PART: ICD-10-CM

## 2025-08-25 PROCEDURE — 1159F MED LIST DOCD IN RCRD: CPT | Performed by: INTERNAL MEDICINE

## 2025-08-25 PROCEDURE — 3074F SYST BP LT 130 MM HG: CPT | Performed by: INTERNAL MEDICINE

## 2025-08-25 PROCEDURE — 99205 OFFICE O/P NEW HI 60 MIN: CPT | Performed by: INTERNAL MEDICINE

## 2025-08-25 PROCEDURE — 3078F DIAST BP <80 MM HG: CPT | Performed by: INTERNAL MEDICINE

## 2025-08-25 RX ORDER — ALBUTEROL SULFATE 0.83 MG/ML
2.5 SOLUTION RESPIRATORY (INHALATION)
COMMUNITY

## 2025-08-25 RX ORDER — ROSUVASTATIN CALCIUM 20 MG/1
1 TABLET, COATED ORAL DAILY
COMMUNITY

## 2025-08-25 RX ORDER — RIVAROXABAN 2.5 MG/1
2.5 TABLET, FILM COATED ORAL 2 TIMES DAILY
COMMUNITY
Start: 2024-11-12

## 2025-08-26 DIAGNOSIS — R06.02 SHORTNESS OF BREATH: ICD-10-CM

## 2025-08-26 DIAGNOSIS — I48.0 PAROXYSMAL ATRIAL FIBRILLATION (HCC): ICD-10-CM

## 2025-08-27 RX ORDER — DILTIAZEM HYDROCHLORIDE 180 MG/1
180 CAPSULE, COATED, EXTENDED RELEASE ORAL DAILY
Qty: 90 CAPSULE | Refills: 3 | Status: SHIPPED | OUTPATIENT
Start: 2025-08-27

## 2025-09-03 ENCOUNTER — HOSPITAL ENCOUNTER (EMERGENCY)
Facility: HOSPITAL | Age: 75
Discharge: HOME | End: 2025-09-03
Payer: MEDICARE

## 2025-09-03 ENCOUNTER — APPOINTMENT (OUTPATIENT)
Dept: CARDIOLOGY | Facility: HOSPITAL | Age: 75
End: 2025-09-03
Payer: MEDICARE

## 2025-09-03 ENCOUNTER — APPOINTMENT (OUTPATIENT)
Dept: RADIOLOGY | Facility: HOSPITAL | Age: 75
End: 2025-09-03
Payer: MEDICARE

## 2025-09-03 VITALS
SYSTOLIC BLOOD PRESSURE: 136 MMHG | DIASTOLIC BLOOD PRESSURE: 74 MMHG | TEMPERATURE: 98.8 F | HEART RATE: 62 BPM | BODY MASS INDEX: 22.05 KG/M2 | HEIGHT: 70 IN | RESPIRATION RATE: 21 BRPM | WEIGHT: 154 LBS | OXYGEN SATURATION: 96 %

## 2025-09-03 DIAGNOSIS — J44.1 COPD EXACERBATION (MULTI): Primary | ICD-10-CM

## 2025-09-03 LAB
ALBUMIN SERPL BCP-MCNC: 4.1 G/DL (ref 3.4–5)
ALP SERPL-CCNC: 65 U/L (ref 33–136)
ALT SERPL W P-5'-P-CCNC: 12 U/L (ref 10–52)
ANION GAP SERPL CALC-SCNC: 9 MMOL/L (ref 10–20)
AST SERPL W P-5'-P-CCNC: 16 U/L (ref 9–39)
BASOPHILS # BLD AUTO: 0.03 X10*3/UL (ref 0–0.1)
BASOPHILS NFR BLD AUTO: 0.4 %
BILIRUB SERPL-MCNC: 0.4 MG/DL (ref 0–1.2)
BNP SERPL-MCNC: 82 PG/ML (ref 0–99)
BUN SERPL-MCNC: 18 MG/DL (ref 6–23)
CALCIUM SERPL-MCNC: 9.4 MG/DL (ref 8.6–10.3)
CARDIAC TROPONIN I PNL SERPL HS: 5 NG/L (ref 0–20)
CHLORIDE SERPL-SCNC: 101 MMOL/L (ref 98–107)
CO2 SERPL-SCNC: 35 MMOL/L (ref 21–32)
CREAT SERPL-MCNC: 0.76 MG/DL (ref 0.5–1.3)
EGFRCR SERPLBLD CKD-EPI 2021: >90 ML/MIN/1.73M*2
EOSINOPHIL # BLD AUTO: 0.03 X10*3/UL (ref 0–0.4)
EOSINOPHIL NFR BLD AUTO: 0.4 %
ERYTHROCYTE [DISTWIDTH] IN BLOOD BY AUTOMATED COUNT: 13.3 % (ref 11.5–14.5)
FLUAV RNA RESP QL NAA+PROBE: NOT DETECTED
FLUBV RNA RESP QL NAA+PROBE: NOT DETECTED
GLUCOSE SERPL-MCNC: 125 MG/DL (ref 74–99)
HCT VFR BLD AUTO: 38.8 % (ref 41–52)
HGB BLD-MCNC: 11.5 G/DL (ref 13.5–17.5)
IMM GRANULOCYTES # BLD AUTO: 0.02 X10*3/UL (ref 0–0.5)
IMM GRANULOCYTES NFR BLD AUTO: 0.2 % (ref 0–0.9)
LYMPHOCYTES # BLD AUTO: 0.39 X10*3/UL (ref 0.8–3)
LYMPHOCYTES NFR BLD AUTO: 4.7 %
MAGNESIUM SERPL-MCNC: 1.78 MG/DL (ref 1.6–2.4)
MCH RBC QN AUTO: 27.2 PG (ref 26–34)
MCHC RBC AUTO-ENTMCNC: 29.6 G/DL (ref 32–36)
MCV RBC AUTO: 92 FL (ref 80–100)
MONOCYTES # BLD AUTO: 0.2 X10*3/UL (ref 0.05–0.8)
MONOCYTES NFR BLD AUTO: 2.4 %
NEUTROPHILS # BLD AUTO: 7.62 X10*3/UL (ref 1.6–5.5)
NEUTROPHILS NFR BLD AUTO: 91.9 %
NRBC BLD-RTO: 0 /100 WBCS (ref 0–0)
PLATELET # BLD AUTO: 196 X10*3/UL (ref 150–450)
POTASSIUM SERPL-SCNC: 4.6 MMOL/L (ref 3.5–5.3)
PROT SERPL-MCNC: 7.1 G/DL (ref 6.4–8.2)
RBC # BLD AUTO: 4.23 X10*6/UL (ref 4.5–5.9)
SARS-COV-2 RNA RESP QL NAA+PROBE: NOT DETECTED
SODIUM SERPL-SCNC: 140 MMOL/L (ref 136–145)
WBC # BLD AUTO: 8.3 X10*3/UL (ref 4.4–11.3)

## 2025-09-03 PROCEDURE — 84484 ASSAY OF TROPONIN QUANT: CPT | Performed by: PHYSICIAN ASSISTANT

## 2025-09-03 PROCEDURE — 94640 AIRWAY INHALATION TREATMENT: CPT

## 2025-09-03 PROCEDURE — 9420000001 HC RT PATIENT EDUCATION 5 MIN

## 2025-09-03 PROCEDURE — 83880 ASSAY OF NATRIURETIC PEPTIDE: CPT | Performed by: PHYSICIAN ASSISTANT

## 2025-09-03 PROCEDURE — 36415 COLL VENOUS BLD VENIPUNCTURE: CPT | Performed by: PHYSICIAN ASSISTANT

## 2025-09-03 PROCEDURE — 71045 X-RAY EXAM CHEST 1 VIEW: CPT

## 2025-09-03 PROCEDURE — 87636 SARSCOV2 & INF A&B AMP PRB: CPT | Performed by: PHYSICIAN ASSISTANT

## 2025-09-03 PROCEDURE — 71045 X-RAY EXAM CHEST 1 VIEW: CPT | Performed by: STUDENT IN AN ORGANIZED HEALTH CARE EDUCATION/TRAINING PROGRAM

## 2025-09-03 PROCEDURE — 2500000002 HC RX 250 W HCPCS SELF ADMINISTERED DRUGS (ALT 637 FOR MEDICARE OP, ALT 636 FOR OP/ED): Performed by: PHYSICIAN ASSISTANT

## 2025-09-03 PROCEDURE — 2500000001 HC RX 250 WO HCPCS SELF ADMINISTERED DRUGS (ALT 637 FOR MEDICARE OP): Performed by: PHYSICIAN ASSISTANT

## 2025-09-03 PROCEDURE — 94664 DEMO&/EVAL PT USE INHALER: CPT

## 2025-09-03 PROCEDURE — 84075 ASSAY ALKALINE PHOSPHATASE: CPT | Performed by: PHYSICIAN ASSISTANT

## 2025-09-03 PROCEDURE — 99284 EMERGENCY DEPT VISIT MOD MDM: CPT

## 2025-09-03 PROCEDURE — 2500000004 HC RX 250 GENERAL PHARMACY W/ HCPCS (ALT 636 FOR OP/ED): Mod: JZ | Performed by: PHYSICIAN ASSISTANT

## 2025-09-03 PROCEDURE — 2500000005 HC RX 250 GENERAL PHARMACY W/O HCPCS: Performed by: PHYSICIAN ASSISTANT

## 2025-09-03 PROCEDURE — 93005 ELECTROCARDIOGRAM TRACING: CPT

## 2025-09-03 PROCEDURE — 83735 ASSAY OF MAGNESIUM: CPT | Performed by: PHYSICIAN ASSISTANT

## 2025-09-03 PROCEDURE — 85025 COMPLETE CBC W/AUTO DIFF WBC: CPT | Performed by: PHYSICIAN ASSISTANT

## 2025-09-03 PROCEDURE — 94762 N-INVAS EAR/PLS OXIMTRY CONT: CPT

## 2025-09-03 RX ORDER — AMOXICILLIN AND CLAVULANATE POTASSIUM 875; 125 MG/1; MG/1
1 TABLET, FILM COATED ORAL ONCE
Status: COMPLETED | OUTPATIENT
Start: 2025-09-03 | End: 2025-09-03

## 2025-09-03 RX ORDER — IPRATROPIUM BROMIDE AND ALBUTEROL SULFATE 2.5; .5 MG/3ML; MG/3ML
3 SOLUTION RESPIRATORY (INHALATION) ONCE
Status: COMPLETED | OUTPATIENT
Start: 2025-09-03 | End: 2025-09-03

## 2025-09-03 RX ORDER — PREDNISONE 10 MG/1
40 TABLET ORAL DAILY
Qty: 20 TABLET | Refills: 0 | Status: SHIPPED | OUTPATIENT
Start: 2025-09-03 | End: 2025-09-09

## 2025-09-03 RX ORDER — AMOXICILLIN AND CLAVULANATE POTASSIUM 875; 125 MG/1; MG/1
1 TABLET, FILM COATED ORAL EVERY 12 HOURS
Qty: 14 TABLET | Refills: 0 | Status: SHIPPED | OUTPATIENT
Start: 2025-09-03 | End: 2025-09-11

## 2025-09-03 RX ADMIN — IPRATROPIUM BROMIDE AND ALBUTEROL SULFATE 3 ML: 2.5; .5 SOLUTION RESPIRATORY (INHALATION) at 15:15

## 2025-09-03 RX ADMIN — Medication 1 DOSE: at 15:15

## 2025-09-03 RX ADMIN — IPRATROPIUM BROMIDE AND ALBUTEROL SULFATE 3 ML: 2.5; .5 SOLUTION RESPIRATORY (INHALATION) at 16:16

## 2025-09-03 RX ADMIN — METHYLPREDNISOLONE SODIUM SUCCINATE 125 MG: 125 INJECTION, POWDER, FOR SOLUTION INTRAMUSCULAR; INTRAVENOUS at 15:13

## 2025-09-03 RX ADMIN — AMOXICILLIN AND CLAVULANATE POTASSIUM 1 TABLET: 875; 125 TABLET, FILM COATED ORAL at 16:26

## 2025-09-03 ASSESSMENT — PAIN SCALES - GENERAL: PAINLEVEL_OUTOF10: 0 - NO PAIN

## 2025-09-03 ASSESSMENT — PAIN - FUNCTIONAL ASSESSMENT: PAIN_FUNCTIONAL_ASSESSMENT: 0-10

## 2025-09-04 LAB
ATRIAL RATE: 61 BPM
P OFFSET: 165 MS
P ONSET: 143 MS
PR INTERVAL: 140 MS
Q ONSET: 213 MS
QRS COUNT: 10 BEATS
QRS DURATION: 86 MS
QT INTERVAL: 384 MS
QTC CALCULATION(BAZETT): 386 MS
QTC FREDERICIA: 385 MS
R AXIS: 66 DEGREES
T AXIS: 82 DEGREES
T OFFSET: 405 MS
VENTRICULAR RATE: 61 BPM

## 2025-10-06 ENCOUNTER — APPOINTMENT (OUTPATIENT)
Dept: CARDIOLOGY | Facility: CLINIC | Age: 75
End: 2025-10-06
Payer: OTHER GOVERNMENT